# Patient Record
Sex: MALE | Race: WHITE | NOT HISPANIC OR LATINO
[De-identification: names, ages, dates, MRNs, and addresses within clinical notes are randomized per-mention and may not be internally consistent; named-entity substitution may affect disease eponyms.]

---

## 2017-01-05 ENCOUNTER — APPOINTMENT (OUTPATIENT)
Dept: UROLOGY | Facility: CLINIC | Age: 72
End: 2017-01-05

## 2017-01-05 ENCOUNTER — OUTPATIENT (OUTPATIENT)
Dept: OUTPATIENT SERVICES | Facility: HOSPITAL | Age: 72
LOS: 1 days | End: 2017-01-05
Payer: MEDICARE

## 2017-01-05 VITALS
SYSTOLIC BLOOD PRESSURE: 154 MMHG | RESPIRATION RATE: 15 BRPM | HEART RATE: 59 BPM | TEMPERATURE: 97.5 F | DIASTOLIC BLOOD PRESSURE: 78 MMHG

## 2017-01-05 DIAGNOSIS — R35.0 FREQUENCY OF MICTURITION: ICD-10-CM

## 2017-01-05 PROCEDURE — 96402 CHEMO HORMON ANTINEOPL SQ/IM: CPT

## 2017-01-05 RX ORDER — GOSERELIN ACETATE 10.8 MG/1
10.8 IMPLANT SUBCUTANEOUS
Qty: 1 | Refills: 0 | Status: COMPLETED | OUTPATIENT
Start: 2017-01-03 | End: 2017-01-05

## 2017-01-05 RX ORDER — GOSERELIN ACETATE 10.8 MG/1
10.8 IMPLANT SUBCUTANEOUS
Qty: 1 | Refills: 0 | Status: COMPLETED | OUTPATIENT
Start: 2017-01-05

## 2017-01-05 RX ADMIN — GOSERELIN ACETATE 0 MG: 10.8 IMPLANT SUBCUTANEOUS at 00:00

## 2017-01-06 ENCOUNTER — APPOINTMENT (OUTPATIENT)
Dept: NEPHROLOGY | Facility: CLINIC | Age: 72
End: 2017-01-06

## 2017-01-12 DIAGNOSIS — C61 MALIGNANT NEOPLASM OF PROSTATE: ICD-10-CM

## 2017-02-22 ENCOUNTER — LABORATORY RESULT (OUTPATIENT)
Age: 72
End: 2017-02-22

## 2017-03-20 ENCOUNTER — LABORATORY RESULT (OUTPATIENT)
Age: 72
End: 2017-03-20

## 2017-03-21 ENCOUNTER — OTHER (OUTPATIENT)
Age: 72
End: 2017-03-21

## 2017-04-05 ENCOUNTER — OUTPATIENT (OUTPATIENT)
Dept: OUTPATIENT SERVICES | Facility: HOSPITAL | Age: 72
LOS: 1 days | End: 2017-04-05
Payer: MEDICARE

## 2017-04-05 ENCOUNTER — APPOINTMENT (OUTPATIENT)
Dept: UROLOGY | Facility: CLINIC | Age: 72
End: 2017-04-05

## 2017-04-05 DIAGNOSIS — R35.0 FREQUENCY OF MICTURITION: ICD-10-CM

## 2017-04-05 DIAGNOSIS — I10 ESSENTIAL (PRIMARY) HYPERTENSION: ICD-10-CM

## 2017-04-05 DIAGNOSIS — R97.20 ELEVATED PROSTATE SPECIFIC ANTIGEN [PSA]: ICD-10-CM

## 2017-04-05 DIAGNOSIS — C61 MALIGNANT NEOPLASM OF PROSTATE: ICD-10-CM

## 2017-04-05 PROCEDURE — 96402 CHEMO HORMON ANTINEOPL SQ/IM: CPT

## 2017-04-05 RX ORDER — GOSERELIN ACETATE 10.8 MG/1
10.8 IMPLANT SUBCUTANEOUS
Qty: 1 | Refills: 0 | Status: COMPLETED | OUTPATIENT
Start: 2017-04-05

## 2017-04-05 RX ORDER — GOSERELIN ACETATE 10.8 MG/1
10.8 IMPLANT SUBCUTANEOUS
Qty: 1 | Refills: 0 | Status: COMPLETED | OUTPATIENT
Start: 2017-04-04 | End: 2017-04-05

## 2017-04-07 ENCOUNTER — APPOINTMENT (OUTPATIENT)
Dept: NEPHROLOGY | Facility: CLINIC | Age: 72
End: 2017-04-07

## 2017-04-07 VITALS
BODY MASS INDEX: 30.04 KG/M2 | SYSTOLIC BLOOD PRESSURE: 125 MMHG | HEART RATE: 55 BPM | WEIGHT: 202.82 LBS | HEIGHT: 69 IN | OXYGEN SATURATION: 99 % | DIASTOLIC BLOOD PRESSURE: 63 MMHG

## 2017-04-10 LAB
25(OH)D3 SERPL-MCNC: 55.2 NG/ML
ALBUMIN SERPL ELPH-MCNC: 4.1 G/DL
ALP BLD-CCNC: 96 U/L
ALT SERPL-CCNC: 9 U/L
ANION GAP SERPL CALC-SCNC: 17 MMOL/L
APPEARANCE: ABNORMAL
AST SERPL-CCNC: 16 U/L
BACTERIA: NEGATIVE
BASOPHILS # BLD AUTO: 0.03 K/UL
BASOPHILS NFR BLD AUTO: 0.5 %
BILIRUB SERPL-MCNC: 0.3 MG/DL
BILIRUBIN URINE: NEGATIVE
BLOOD URINE: ABNORMAL
BUN SERPL-MCNC: 23 MG/DL
CALCIUM SERPL-MCNC: 10.2 MG/DL
CHLORIDE SERPL-SCNC: 99 MMOL/L
CHOLEST SERPL-MCNC: 141 MG/DL
CHOLEST/HDLC SERPL: 2.8 RATIO
CMV DNA SPEC QL NAA+PROBE: NOT DETECTED IU/ML
CMVPCR LOG: NOT DETECTED LOGIU/ML
CO2 SERPL-SCNC: 21 MMOL/L
COLOR: YELLOW
CREAT SERPL-MCNC: 1.3 MG/DL
EOSINOPHIL # BLD AUTO: 0.12 K/UL
EOSINOPHIL NFR BLD AUTO: 2.1 %
GLUCOSE QUALITATIVE U: NORMAL MG/DL
GLUCOSE SERPL-MCNC: 116 MG/DL
HBA1C MFR BLD HPLC: 6.2 %
HCT VFR BLD CALC: 37.3 %
HDLC SERPL-MCNC: 50 MG/DL
HGB BLD-MCNC: 11.8 G/DL
HYALINE CASTS: 1 /LPF
IMM GRANULOCYTES NFR BLD AUTO: 0.3 %
KETONES URINE: NEGATIVE
LDLC SERPL CALC-MCNC: 74 MG/DL
LEUKOCYTE ESTERASE URINE: ABNORMAL
LYMPHOCYTES # BLD AUTO: 1.41 K/UL
LYMPHOCYTES NFR BLD AUTO: 24.4 %
MAGNESIUM SERPL-MCNC: 2 MG/DL
MAN DIFF?: NORMAL
MCHC RBC-ENTMCNC: 27.6 PG
MCHC RBC-ENTMCNC: 31.6 GM/DL
MCV RBC AUTO: 87.4 FL
MICROSCOPIC-UA: NORMAL
MONOCYTES # BLD AUTO: 0.62 K/UL
MONOCYTES NFR BLD AUTO: 10.7 %
NEUTROPHILS # BLD AUTO: 3.58 K/UL
NEUTROPHILS NFR BLD AUTO: 62 %
NITRITE URINE: NEGATIVE
PH URINE: 7
PHOSPHATE SERPL-MCNC: 3.4 MG/DL
PLATELET # BLD AUTO: 324 K/UL
POTASSIUM SERPL-SCNC: 5 MMOL/L
PROT SERPL-MCNC: 7.5 G/DL
PROTEIN URINE: 100 MG/DL
RBC # BLD: 4.27 M/UL
RBC # FLD: 15.7 %
RED BLOOD CELLS URINE: 239 /HPF
SODIUM SERPL-SCNC: 137 MMOL/L
SPECIFIC GRAVITY URINE: 1.01
SQUAMOUS EPITHELIAL CELLS: 0 /HPF
TACROLIMUS SERPL-MCNC: 5.3 NG/ML
TRIGL SERPL-MCNC: 87 MG/DL
URATE SERPL-MCNC: 5.6 MG/DL
UROBILINOGEN URINE: NORMAL MG/DL
WBC # FLD AUTO: 5.78 K/UL
WHITE BLOOD CELLS URINE: 51 /HPF

## 2017-04-11 LAB
BKV DNA SPEC QL NAA+PROBE: NORMAL
CREAT SPEC-SCNC: 84 MG/DL
MICROALBUMIN 24H UR DL<=1MG/L-MCNC: 36.2 MG/DL
MICROALBUMIN/CREAT 24H UR-RTO: 432 UG/MG

## 2017-05-03 ENCOUNTER — OTHER (OUTPATIENT)
Age: 72
End: 2017-05-03

## 2017-05-03 ENCOUNTER — LABORATORY RESULT (OUTPATIENT)
Age: 72
End: 2017-05-03

## 2017-05-04 ENCOUNTER — APPOINTMENT (OUTPATIENT)
Dept: NUCLEAR MEDICINE | Facility: IMAGING CENTER | Age: 72
End: 2017-05-04

## 2017-05-04 ENCOUNTER — APPOINTMENT (OUTPATIENT)
Dept: CT IMAGING | Facility: IMAGING CENTER | Age: 72
End: 2017-05-04

## 2017-05-04 ENCOUNTER — OUTPATIENT (OUTPATIENT)
Dept: OUTPATIENT SERVICES | Facility: HOSPITAL | Age: 72
LOS: 1 days | End: 2017-05-04
Payer: MEDICARE

## 2017-05-04 DIAGNOSIS — N18.9 CHRONIC KIDNEY DISEASE, UNSPECIFIED: ICD-10-CM

## 2017-05-04 DIAGNOSIS — I10 ESSENTIAL (PRIMARY) HYPERTENSION: ICD-10-CM

## 2017-05-04 DIAGNOSIS — C61 MALIGNANT NEOPLASM OF PROSTATE: ICD-10-CM

## 2017-05-04 DIAGNOSIS — E78.5 HYPERLIPIDEMIA, UNSPECIFIED: ICD-10-CM

## 2017-05-04 PROCEDURE — 74176 CT ABD & PELVIS W/O CONTRAST: CPT

## 2017-05-04 PROCEDURE — 78306 BONE IMAGING WHOLE BODY: CPT

## 2017-05-04 PROCEDURE — A9561: CPT

## 2017-06-22 ENCOUNTER — MEDICATION RENEWAL (OUTPATIENT)
Age: 72
End: 2017-06-22

## 2017-07-13 ENCOUNTER — APPOINTMENT (OUTPATIENT)
Dept: UROLOGY | Facility: CLINIC | Age: 72
End: 2017-07-13

## 2017-07-13 ENCOUNTER — OUTPATIENT (OUTPATIENT)
Dept: OUTPATIENT SERVICES | Facility: HOSPITAL | Age: 72
LOS: 1 days | End: 2017-07-13
Payer: MEDICARE

## 2017-07-13 VITALS
SYSTOLIC BLOOD PRESSURE: 116 MMHG | HEART RATE: 65 BPM | DIASTOLIC BLOOD PRESSURE: 67 MMHG | TEMPERATURE: 98 F | RESPIRATION RATE: 16 BRPM

## 2017-07-13 DIAGNOSIS — R35.0 FREQUENCY OF MICTURITION: ICD-10-CM

## 2017-07-13 DIAGNOSIS — R31.0 GROSS HEMATURIA: ICD-10-CM

## 2017-07-13 PROCEDURE — 96402 CHEMO HORMON ANTINEOPL SQ/IM: CPT

## 2017-07-13 RX ORDER — GOSERELIN ACETATE 10.8 MG/1
10.8 IMPLANT SUBCUTANEOUS
Qty: 1 | Refills: 0 | Status: COMPLETED | OUTPATIENT
Start: 2017-07-13

## 2017-07-13 RX ORDER — GOSERELIN ACETATE 10.8 MG/1
10.8 IMPLANT SUBCUTANEOUS
Qty: 1 | Refills: 0 | Status: COMPLETED | OUTPATIENT
Start: 2017-07-11 | End: 2017-07-13

## 2017-07-13 RX ADMIN — Medication 0 MG: at 00:00

## 2017-07-14 DIAGNOSIS — C61 MALIGNANT NEOPLASM OF PROSTATE: ICD-10-CM

## 2017-07-14 DIAGNOSIS — R31.0 GROSS HEMATURIA: ICD-10-CM

## 2017-07-14 LAB
APPEARANCE: ABNORMAL
BACTERIA: ABNORMAL
BILIRUBIN URINE: NEGATIVE
BLOOD URINE: ABNORMAL
COLOR: ABNORMAL
GLUCOSE QUALITATIVE U: NORMAL MG/DL
HYALINE CASTS: 0 /LPF
KETONES URINE: NEGATIVE
LEUKOCYTE ESTERASE URINE: NEGATIVE
MICROSCOPIC-UA: NORMAL
NITRITE URINE: NEGATIVE
PH URINE: 6.5
PROTEIN URINE: 100 MG/DL
RED BLOOD CELLS URINE: >720 /HPF
SPECIFIC GRAVITY URINE: 1.01
SQUAMOUS EPITHELIAL CELLS: 8 /HPF
UROBILINOGEN URINE: NORMAL MG/DL
WHITE BLOOD CELLS URINE: 28 /HPF

## 2017-07-16 ENCOUNTER — RX RENEWAL (OUTPATIENT)
Age: 72
End: 2017-07-16

## 2017-07-18 ENCOUNTER — OUTPATIENT (OUTPATIENT)
Dept: OUTPATIENT SERVICES | Facility: HOSPITAL | Age: 72
LOS: 1 days | End: 2017-07-18
Payer: MEDICARE

## 2017-07-18 DIAGNOSIS — C61 MALIGNANT NEOPLASM OF PROSTATE: ICD-10-CM

## 2017-07-18 PROCEDURE — C1894: CPT

## 2017-07-18 PROCEDURE — 36561 INSERT TUNNELED CV CATH: CPT

## 2017-07-18 PROCEDURE — 76937 US GUIDE VASCULAR ACCESS: CPT | Mod: 26

## 2017-07-18 PROCEDURE — C1788: CPT

## 2017-07-18 PROCEDURE — 77001 FLUOROGUIDE FOR VEIN DEVICE: CPT

## 2017-07-18 PROCEDURE — C1769: CPT

## 2017-07-18 PROCEDURE — 77001 FLUOROGUIDE FOR VEIN DEVICE: CPT | Mod: 26

## 2017-07-18 PROCEDURE — 76937 US GUIDE VASCULAR ACCESS: CPT

## 2017-07-24 DIAGNOSIS — Z45.2 ENCOUNTER FOR ADJUSTMENT AND MANAGEMENT OF VASCULAR ACCESS DEVICE: ICD-10-CM

## 2017-07-24 DIAGNOSIS — C61 MALIGNANT NEOPLASM OF PROSTATE: ICD-10-CM

## 2017-07-29 LAB — CORE LAB FLUID CYTOLOGY: NORMAL

## 2017-08-11 ENCOUNTER — APPOINTMENT (OUTPATIENT)
Dept: NEPHROLOGY | Facility: CLINIC | Age: 72
End: 2017-08-11
Payer: MEDICARE

## 2017-08-11 VITALS
HEART RATE: 76 BPM | HEIGHT: 69 IN | DIASTOLIC BLOOD PRESSURE: 71 MMHG | WEIGHT: 188 LBS | BODY MASS INDEX: 27.85 KG/M2 | SYSTOLIC BLOOD PRESSURE: 143 MMHG | OXYGEN SATURATION: 98 %

## 2017-08-11 PROCEDURE — 99214 OFFICE O/P EST MOD 30 MIN: CPT

## 2017-08-11 RX ORDER — LIDOCAINE AND PRILOCAINE 25; 25 MG/G; MG/G
2.5-2.5 CREAM TOPICAL
Qty: 30 | Refills: 0 | Status: ACTIVE | COMMUNITY
Start: 2017-07-11

## 2017-08-11 RX ORDER — ONDANSETRON 4 MG/1
4 TABLET ORAL
Qty: 30 | Refills: 0 | Status: ACTIVE | COMMUNITY
Start: 2017-07-11

## 2017-08-11 RX ORDER — DOCETAXEL 80 MG/4ML
INJECTION, SOLUTION, CONCENTRATE INTRAVENOUS
Refills: 0 | Status: ACTIVE | COMMUNITY

## 2017-08-11 RX ORDER — DEXAMETHASONE 4 MG/1
4 TABLET ORAL
Qty: 4 | Refills: 0 | Status: DISCONTINUED | COMMUNITY
Start: 2017-07-11 | End: 2017-08-11

## 2017-08-11 RX ORDER — ABIRATERONE ACETATE 250 MG/1
250 TABLET ORAL
Qty: 120 | Refills: 0 | Status: DISCONTINUED | COMMUNITY
Start: 2017-04-21 | End: 2017-08-11

## 2017-08-14 ENCOUNTER — LABORATORY RESULT (OUTPATIENT)
Age: 72
End: 2017-08-14

## 2017-08-15 LAB
25(OH)D3 SERPL-MCNC: 37.2 NG/ML
ALBUMIN SERPL ELPH-MCNC: 3.8 G/DL
ALP BLD-CCNC: 78 U/L
ALT SERPL-CCNC: 16 U/L
ANION GAP SERPL CALC-SCNC: 15 MMOL/L
APPEARANCE: ABNORMAL
AST SERPL-CCNC: 14 U/L
BACTERIA: ABNORMAL
BASOPHILS # BLD AUTO: 0.01 K/UL
BASOPHILS NFR BLD AUTO: 0.1 %
BILIRUB SERPL-MCNC: 0.7 MG/DL
BILIRUBIN URINE: NEGATIVE
BLOOD URINE: ABNORMAL
BUN SERPL-MCNC: 36 MG/DL
CALCIUM SERPL-MCNC: 9.3 MG/DL
CHLORIDE SERPL-SCNC: 100 MMOL/L
CHOLEST SERPL-MCNC: 148 MG/DL
CHOLEST/HDLC SERPL: 2.3 RATIO
CMV DNA SPEC QL NAA+PROBE: NOT DETECTED IU/ML
CO2 SERPL-SCNC: 20 MMOL/L
COLOR: YELLOW
CREAT SERPL-MCNC: 1.56 MG/DL
CREAT SPEC-SCNC: 76 MG/DL
EOSINOPHIL # BLD AUTO: 0.03 K/UL
EOSINOPHIL NFR BLD AUTO: 0.3 %
GLUCOSE QUALITATIVE U: NORMAL MG/DL
GLUCOSE SERPL-MCNC: 144 MG/DL
HBA1C MFR BLD HPLC: 6.4 %
HCT VFR BLD CALC: 34.3 %
HDLC SERPL-MCNC: 64 MG/DL
HGB BLD-MCNC: 10.7 G/DL
HYALINE CASTS: 0 /LPF
IMM GRANULOCYTES NFR BLD AUTO: 0.4 %
KETONES URINE: NEGATIVE
LDLC SERPL CALC-MCNC: 66 MG/DL
LEUKOCYTE ESTERASE URINE: ABNORMAL
LYMPHOCYTES # BLD AUTO: 0.76 K/UL
LYMPHOCYTES NFR BLD AUTO: 7.9 %
MAGNESIUM SERPL-MCNC: 1.7 MG/DL
MAN DIFF?: NORMAL
MCHC RBC-ENTMCNC: 26 PG
MCHC RBC-ENTMCNC: 31.2 GM/DL
MCV RBC AUTO: 83.3 FL
MICROALBUMIN 24H UR DL<=1MG/L-MCNC: 108.8 MG/DL
MICROALBUMIN/CREAT 24H UR-RTO: 1432 MG/G
MICROSCOPIC-UA: NORMAL
MONOCYTES # BLD AUTO: 0.52 K/UL
MONOCYTES NFR BLD AUTO: 5.4 %
NEUTROPHILS # BLD AUTO: 8.28 K/UL
NEUTROPHILS NFR BLD AUTO: 85.9 %
NITRITE URINE: NEGATIVE
PH URINE: 6.5
PLATELET # BLD AUTO: 182 K/UL
POTASSIUM SERPL-SCNC: 5 MMOL/L
PROT SERPL-MCNC: 6.2 G/DL
PROTEIN URINE: 300 MG/DL
RBC # BLD: 4.12 M/UL
RBC # FLD: 16.7 %
RED BLOOD CELLS URINE: 352 /HPF
SODIUM SERPL-SCNC: 135 MMOL/L
SPECIFIC GRAVITY URINE: 1.02
SQUAMOUS EPITHELIAL CELLS: 3 /HPF
TACROLIMUS SERPL-MCNC: 5.6 NG/ML
TRIGL SERPL-MCNC: 92 MG/DL
URATE SERPL-MCNC: 5.4 MG/DL
UROBILINOGEN URINE: NORMAL MG/DL
WBC # FLD AUTO: 9.64 K/UL
WHITE BLOOD CELLS URINE: >720 /HPF

## 2017-08-17 LAB — BKV DNA SPEC QL NAA+PROBE: NORMAL

## 2017-09-13 ENCOUNTER — OTHER (OUTPATIENT)
Age: 72
End: 2017-09-13

## 2017-09-13 DIAGNOSIS — R31.29 OTHER MICROSCOPIC HEMATURIA: ICD-10-CM

## 2017-09-15 ENCOUNTER — LABORATORY RESULT (OUTPATIENT)
Age: 72
End: 2017-09-15

## 2017-09-15 ENCOUNTER — MEDICATION RENEWAL (OUTPATIENT)
Age: 72
End: 2017-09-15

## 2017-09-15 LAB
ALBUMIN SERPL ELPH-MCNC: 3.6 G/DL
ALP BLD-CCNC: 79 U/L
ALT SERPL-CCNC: 24 U/L
ANION GAP SERPL CALC-SCNC: 17 MMOL/L
APPEARANCE: ABNORMAL
AST SERPL-CCNC: 22 U/L
BACTERIA: NEGATIVE
BASOPHILS # BLD AUTO: 0.07 K/UL
BASOPHILS NFR BLD AUTO: 2.6 %
BILIRUB SERPL-MCNC: 0.4 MG/DL
BILIRUBIN URINE: NEGATIVE
BLOOD URINE: ABNORMAL
BUN SERPL-MCNC: 33 MG/DL
CALCIUM SERPL-MCNC: 10 MG/DL
CHLORIDE SERPL-SCNC: 104 MMOL/L
CO2 SERPL-SCNC: 20 MMOL/L
COLOR: YELLOW
CREAT SERPL-MCNC: 1.28 MG/DL
CREAT SPEC-SCNC: 40 MG/DL
EOSINOPHIL # BLD AUTO: 0 K/UL
EOSINOPHIL NFR BLD AUTO: 0 %
GLUCOSE QUALITATIVE U: NORMAL MG/DL
GLUCOSE SERPL-MCNC: 123 MG/DL
HCT VFR BLD CALC: 29.1 %
HGB BLD-MCNC: 9.1 G/DL
HYALINE CASTS: 1 /LPF
KETONES URINE: NEGATIVE
LEUKOCYTE ESTERASE URINE: ABNORMAL
LYMPHOCYTES # BLD AUTO: 1.12 K/UL
LYMPHOCYTES NFR BLD AUTO: 39.1 %
MAGNESIUM SERPL-MCNC: 1.7 MG/DL
MAN DIFF?: NORMAL
MCHC RBC-ENTMCNC: 26.6 PG
MCHC RBC-ENTMCNC: 31.3 GM/DL
MCV RBC AUTO: 85.1 FL
MICROALBUMIN 24H UR DL<=1MG/L-MCNC: 56.7 MG/DL
MICROALBUMIN/CREAT 24H UR-RTO: 1418 MG/G
MICROSCOPIC-UA: NORMAL
MONOCYTES # BLD AUTO: 0.15 K/UL
MONOCYTES NFR BLD AUTO: 5.2 %
NEUTROPHILS # BLD AUTO: 1.49 K/UL
NEUTROPHILS NFR BLD AUTO: 50.5 %
NITRITE URINE: NEGATIVE
PH URINE: 6.5
PHOSPHATE SERPL-MCNC: 3.2 MG/DL
PLATELET # BLD AUTO: 278 K/UL
POTASSIUM SERPL-SCNC: 4.6 MMOL/L
PROT SERPL-MCNC: 6.6 G/DL
PROTEIN URINE: 100 MG/DL
RBC # BLD: 3.42 M/UL
RBC # FLD: 18.5 %
RED BLOOD CELLS URINE: 453 /HPF
SODIUM SERPL-SCNC: 141 MMOL/L
SPECIFIC GRAVITY URINE: 1.01
SQUAMOUS EPITHELIAL CELLS: 0 /HPF
TACROLIMUS SERPL-MCNC: 3.6 NG/ML
URATE SERPL-MCNC: 6 MG/DL
UROBILINOGEN URINE: NORMAL MG/DL
WBC # FLD AUTO: 2.86 K/UL
WHITE BLOOD CELLS URINE: 215 /HPF

## 2017-10-11 ENCOUNTER — APPOINTMENT (OUTPATIENT)
Dept: UROLOGY | Facility: CLINIC | Age: 72
End: 2017-10-11
Payer: MEDICARE

## 2017-10-11 ENCOUNTER — OUTPATIENT (OUTPATIENT)
Dept: OUTPATIENT SERVICES | Facility: HOSPITAL | Age: 72
LOS: 1 days | End: 2017-10-11
Payer: MEDICARE

## 2017-10-11 DIAGNOSIS — R97.20 ELEVATED PROSTATE, SPECIFIC ANTIGEN [PSA]: ICD-10-CM

## 2017-10-11 DIAGNOSIS — R35.0 FREQUENCY OF MICTURITION: ICD-10-CM

## 2017-10-11 DIAGNOSIS — R97.20 ELEVATED PROSTATE SPECIFIC ANTIGEN [PSA]: ICD-10-CM

## 2017-10-11 DIAGNOSIS — Z94.0 KIDNEY TRANSPLANT STATUS: ICD-10-CM

## 2017-10-11 DIAGNOSIS — C61 MALIGNANT NEOPLASM OF PROSTATE: ICD-10-CM

## 2017-10-11 PROCEDURE — 99213 OFFICE O/P EST LOW 20 MIN: CPT

## 2017-10-11 PROCEDURE — 88112 CYTOPATH CELL ENHANCE TECH: CPT | Mod: 26

## 2017-10-11 PROCEDURE — 96402 CHEMO HORMON ANTINEOPL SQ/IM: CPT

## 2017-10-11 RX ORDER — GOSERELIN ACETATE 10.8 MG/1
10.8 IMPLANT SUBCUTANEOUS
Qty: 1 | Refills: 0 | Status: COMPLETED | OUTPATIENT
Start: 2017-10-11

## 2017-10-11 RX ORDER — GOSERELIN ACETATE 10.8 MG/1
10.8 IMPLANT SUBCUTANEOUS
Qty: 1 | Refills: 0 | Status: COMPLETED | OUTPATIENT
Start: 2017-10-10 | End: 2017-10-11

## 2017-10-11 RX ADMIN — Medication 0 MG: at 00:00

## 2017-10-12 LAB
APPEARANCE: ABNORMAL
BACTERIA UR CULT: NORMAL
BACTERIA: NEGATIVE
BILIRUBIN URINE: NEGATIVE
BLOOD URINE: ABNORMAL
CALCIUM OXALATE CRYSTALS: NEGATIVE
COLOR: YELLOW
GLUCOSE QUALITATIVE U: NEGATIVE MG/DL
GRANULAR CASTS: 0 /LPF
HYALINE CASTS: 0 /LPF
KETONES URINE: NEGATIVE
LEUKOCYTE ESTERASE URINE: ABNORMAL
MICROSCOPIC-UA: NORMAL
NITRITE URINE: NEGATIVE
PH URINE: 6
PROTEIN URINE: 100 MG/DL
RED BLOOD CELLS URINE: 25 /HPF
SPECIFIC GRAVITY URINE: 1.01
SQUAMOUS EPITHELIAL CELLS: 1 /HPF
TRIPLE PHOSPHATE CRYSTALS: NEGATIVE
URIC ACID CRYSTALS: NEGATIVE
UROBILINOGEN URINE: NEGATIVE MG/DL
WHITE BLOOD CELLS URINE: >720 /HPF

## 2017-10-16 LAB — CORE LAB FLUID CYTOLOGY: NORMAL

## 2017-10-30 ENCOUNTER — LABORATORY RESULT (OUTPATIENT)
Age: 72
End: 2017-10-30

## 2017-10-30 ENCOUNTER — APPOINTMENT (OUTPATIENT)
Dept: NEPHROLOGY | Facility: CLINIC | Age: 72
End: 2017-10-30
Payer: MEDICARE

## 2017-10-30 VITALS
OXYGEN SATURATION: 98 % | HEIGHT: 69 IN | HEART RATE: 83 BPM | SYSTOLIC BLOOD PRESSURE: 126 MMHG | WEIGHT: 200 LBS | DIASTOLIC BLOOD PRESSURE: 70 MMHG | BODY MASS INDEX: 29.62 KG/M2

## 2017-10-30 DIAGNOSIS — I10 ESSENTIAL (PRIMARY) HYPERTENSION: ICD-10-CM

## 2017-10-30 DIAGNOSIS — Z94.0 KIDNEY TRANSPLANT STATUS: ICD-10-CM

## 2017-10-30 PROCEDURE — 36415 COLL VENOUS BLD VENIPUNCTURE: CPT

## 2017-10-30 PROCEDURE — 99214 OFFICE O/P EST MOD 30 MIN: CPT | Mod: 25

## 2017-11-01 LAB
25(OH)D3 SERPL-MCNC: 28.9 NG/ML
ALBUMIN SERPL ELPH-MCNC: 4.2 G/DL
ALP BLD-CCNC: 65 U/L
ALT SERPL-CCNC: 17 U/L
ANION GAP SERPL CALC-SCNC: 14 MMOL/L
APPEARANCE: ABNORMAL
AST SERPL-CCNC: 13 U/L
BACTERIA: NEGATIVE
BASOPHILS # BLD AUTO: 0 K/UL
BASOPHILS NFR BLD AUTO: 0 %
BILIRUB SERPL-MCNC: 0.2 MG/DL
BILIRUBIN URINE: NEGATIVE
BKV DNA SPEC QL NAA+PROBE: NORMAL
BLOOD URINE: ABNORMAL
BUN SERPL-MCNC: 34 MG/DL
CALCIUM OXALATE CRYSTALS: NEGATIVE
CALCIUM SERPL-MCNC: 9.8 MG/DL
CHLORIDE SERPL-SCNC: 102 MMOL/L
CHOLEST SERPL-MCNC: 154 MG/DL
CHOLEST/HDLC SERPL: 2.8 RATIO
CMV DNA SPEC QL NAA+PROBE: NOT DETECTED IU/ML
CMVPCR LOG: NOT DETECTED LOGIU/ML
CO2 SERPL-SCNC: 22 MMOL/L
COLOR: ABNORMAL
CREAT SERPL-MCNC: 1.37 MG/DL
CREAT SPEC-SCNC: 74 MG/DL
EOSINOPHIL # BLD AUTO: 0.04 K/UL
EOSINOPHIL NFR BLD AUTO: 0.9 %
GLUCOSE QUALITATIVE U: NEGATIVE
GLUCOSE SERPL-MCNC: 123 MG/DL
GRANULAR CASTS: 0 /LPF
HBA1C MFR BLD HPLC: 5.5 %
HCT VFR BLD CALC: 32 %
HDLC SERPL-MCNC: 56 MG/DL
HGB BLD-MCNC: 9.7 G/DL
HYALINE CASTS: 0 /LPF
KETONES URINE: NEGATIVE
LDLC SERPL CALC-MCNC: 74 MG/DL
LEUKOCYTE ESTERASE URINE: ABNORMAL
LYMPHOCYTES # BLD AUTO: 0.67 K/UL
LYMPHOCYTES NFR BLD AUTO: 16.7 %
MAGNESIUM SERPL-MCNC: 1.8 MG/DL
MAN DIFF?: NORMAL
MCHC RBC-ENTMCNC: 25.3 PG
MCHC RBC-ENTMCNC: 30.3 GM/DL
MCV RBC AUTO: 83.3 FL
MICROALBUMIN 24H UR DL<=1MG/L-MCNC: 70.9 MG/DL
MICROALBUMIN/CREAT 24H UR-RTO: 958 MG/G
MICROSCOPIC-UA: NORMAL
MONOCYTES # BLD AUTO: 0.81 K/UL
MONOCYTES NFR BLD AUTO: 20.2 %
NEUTROPHILS # BLD AUTO: 2.39 K/UL
NEUTROPHILS NFR BLD AUTO: 59.6 %
NITRITE URINE: NEGATIVE
PH URINE: 6
PHOSPHATE SERPL-MCNC: 3.9 MG/DL
PLATELET # BLD AUTO: 299 K/UL
POTASSIUM SERPL-SCNC: 5 MMOL/L
PROT SERPL-MCNC: 5.8 G/DL
PROTEIN URINE: 100
RBC # BLD: 3.84 M/UL
RBC # FLD: 17.6 %
RED BLOOD CELLS URINE: >720 /HPF
SODIUM SERPL-SCNC: 138 MMOL/L
SPECIFIC GRAVITY URINE: 1.01
SQUAMOUS EPITHELIAL CELLS: 0 /HPF
TACROLIMUS SERPL-MCNC: 5.7 NG/ML
TRIGL SERPL-MCNC: 122 MG/DL
TRIPLE PHOSPHATE CRYSTALS: NEGATIVE
URATE SERPL-MCNC: 5.7 MG/DL
URIC ACID CRYSTALS: NEGATIVE
UROBILINOGEN URINE: NEGATIVE
WBC # FLD AUTO: 4.01 K/UL
WHITE BLOOD CELLS URINE: 112 /HPF

## 2017-12-16 ENCOUNTER — INPATIENT (INPATIENT)
Facility: HOSPITAL | Age: 72
LOS: 11 days | End: 2017-12-28
Attending: INTERNAL MEDICINE | Admitting: INTERNAL MEDICINE
Payer: MEDICARE

## 2017-12-16 VITALS
SYSTOLIC BLOOD PRESSURE: 95 MMHG | TEMPERATURE: 98 F | OXYGEN SATURATION: 83 % | DIASTOLIC BLOOD PRESSURE: 35 MMHG | HEART RATE: 91 BPM | RESPIRATION RATE: 32 BRPM

## 2017-12-16 DIAGNOSIS — R09.02 HYPOXEMIA: ICD-10-CM

## 2017-12-16 DIAGNOSIS — Z94.0 KIDNEY TRANSPLANT STATUS: Chronic | ICD-10-CM

## 2017-12-16 LAB
ALBUMIN SERPL ELPH-MCNC: 3.2 G/DL — LOW (ref 3.3–5)
ALP SERPL-CCNC: 126 U/L — HIGH (ref 40–120)
ALT FLD-CCNC: 32 U/L — SIGNIFICANT CHANGE UP (ref 4–41)
ANISOCYTOSIS BLD QL: SLIGHT — SIGNIFICANT CHANGE UP
AST SERPL-CCNC: 34 U/L — SIGNIFICANT CHANGE UP (ref 4–40)
B PERT DNA SPEC QL NAA+PROBE: SIGNIFICANT CHANGE UP
BASE EXCESS BLDV CALC-SCNC: -2.1 MMOL/L — SIGNIFICANT CHANGE UP
BASE EXCESS BLDV CALC-SCNC: -5.7 MMOL/L — SIGNIFICANT CHANGE UP
BASOPHILS # BLD AUTO: 0.06 K/UL — SIGNIFICANT CHANGE UP (ref 0–0.2)
BASOPHILS NFR BLD AUTO: 0.3 % — SIGNIFICANT CHANGE UP (ref 0–2)
BASOPHILS NFR SPEC: 0 % — SIGNIFICANT CHANGE UP (ref 0–2)
BILIRUB SERPL-MCNC: 0.4 MG/DL — SIGNIFICANT CHANGE UP (ref 0.2–1.2)
BLOOD GAS VENOUS - CREATININE: 2.29 MG/DL — HIGH (ref 0.5–1.3)
BLOOD GAS VENOUS - CREATININE: SIGNIFICANT CHANGE UP MG/DL (ref 0.5–1.3)
BUN SERPL-MCNC: 72 MG/DL — HIGH (ref 7–23)
C PNEUM DNA SPEC QL NAA+PROBE: NOT DETECTED — SIGNIFICANT CHANGE UP
CALCIUM SERPL-MCNC: 9.6 MG/DL — SIGNIFICANT CHANGE UP (ref 8.4–10.5)
CHLORIDE BLDV-SCNC: 108 MMOL/L — SIGNIFICANT CHANGE UP (ref 96–108)
CHLORIDE BLDV-SCNC: 109 MMOL/L — HIGH (ref 96–108)
CHLORIDE SERPL-SCNC: 103 MMOL/L — SIGNIFICANT CHANGE UP (ref 98–107)
CK MB BLD-MCNC: 1.65 NG/ML — SIGNIFICANT CHANGE UP (ref 1–6.6)
CK SERPL-CCNC: 41 U/L — SIGNIFICANT CHANGE UP (ref 30–200)
CO2 SERPL-SCNC: 18 MMOL/L — LOW (ref 22–31)
CREAT SERPL-MCNC: 2.23 MG/DL — HIGH (ref 0.5–1.3)
EOSINOPHIL # BLD AUTO: 0 K/UL — SIGNIFICANT CHANGE UP (ref 0–0.5)
EOSINOPHIL NFR BLD AUTO: 0 % — SIGNIFICANT CHANGE UP (ref 0–6)
EOSINOPHIL NFR FLD: 0.8 % — SIGNIFICANT CHANGE UP (ref 0–6)
FLUAV H1 2009 PAND RNA SPEC QL NAA+PROBE: NOT DETECTED — SIGNIFICANT CHANGE UP
FLUAV H1 RNA SPEC QL NAA+PROBE: NOT DETECTED — SIGNIFICANT CHANGE UP
FLUAV H3 RNA SPEC QL NAA+PROBE: NOT DETECTED — SIGNIFICANT CHANGE UP
FLUAV SUBTYP SPEC NAA+PROBE: SIGNIFICANT CHANGE UP
FLUBV RNA SPEC QL NAA+PROBE: NOT DETECTED — SIGNIFICANT CHANGE UP
GAS PNL BLDV: 135 MMOL/L — LOW (ref 136–146)
GAS PNL BLDV: 140 MMOL/L — SIGNIFICANT CHANGE UP (ref 136–146)
GIANT PLATELETS BLD QL SMEAR: PRESENT — SIGNIFICANT CHANGE UP
GLUCOSE BLDV-MCNC: 173 — HIGH (ref 70–99)
GLUCOSE BLDV-MCNC: 209 — HIGH (ref 70–99)
GLUCOSE SERPL-MCNC: 204 MG/DL — HIGH (ref 70–99)
HADV DNA SPEC QL NAA+PROBE: NOT DETECTED — SIGNIFICANT CHANGE UP
HCO3 BLDV-SCNC: 19 MMOL/L — LOW (ref 20–27)
HCO3 BLDV-SCNC: 22 MMOL/L — SIGNIFICANT CHANGE UP (ref 20–27)
HCOV 229E RNA SPEC QL NAA+PROBE: NOT DETECTED — SIGNIFICANT CHANGE UP
HCOV HKU1 RNA SPEC QL NAA+PROBE: NOT DETECTED — SIGNIFICANT CHANGE UP
HCOV NL63 RNA SPEC QL NAA+PROBE: NOT DETECTED — SIGNIFICANT CHANGE UP
HCOV OC43 RNA SPEC QL NAA+PROBE: NOT DETECTED — SIGNIFICANT CHANGE UP
HCT VFR BLD CALC: 33 % — LOW (ref 39–50)
HCT VFR BLDV CALC: 30.9 % — LOW (ref 39–51)
HCT VFR BLDV CALC: 31.2 % — LOW (ref 39–51)
HGB BLD-MCNC: 10 G/DL — LOW (ref 13–17)
HGB BLDV-MCNC: 10 G/DL — LOW (ref 13–17)
HGB BLDV-MCNC: 10.1 G/DL — LOW (ref 13–17)
HMPV RNA SPEC QL NAA+PROBE: NOT DETECTED — SIGNIFICANT CHANGE UP
HPIV1 RNA SPEC QL NAA+PROBE: NOT DETECTED — SIGNIFICANT CHANGE UP
HPIV2 RNA SPEC QL NAA+PROBE: NOT DETECTED — SIGNIFICANT CHANGE UP
HPIV3 RNA SPEC QL NAA+PROBE: NOT DETECTED — SIGNIFICANT CHANGE UP
HPIV4 RNA SPEC QL NAA+PROBE: NOT DETECTED — SIGNIFICANT CHANGE UP
HYPOCHROMIA BLD QL: SLIGHT — SIGNIFICANT CHANGE UP
IMM GRANULOCYTES # BLD AUTO: 0.72 # — SIGNIFICANT CHANGE UP
IMM GRANULOCYTES NFR BLD AUTO: 3.4 % — HIGH (ref 0–1.5)
LACTATE BLDV-MCNC: 2.7 MMOL/L — HIGH (ref 0.5–2)
LACTATE BLDV-MCNC: 4.2 MMOL/L — CRITICAL HIGH (ref 0.5–2)
LYMPHOCYTES # BLD AUTO: 0.98 K/UL — LOW (ref 1–3.3)
LYMPHOCYTES # BLD AUTO: 4.7 % — LOW (ref 13–44)
LYMPHOCYTES NFR SPEC AUTO: 2.6 % — LOW (ref 13–44)
M PNEUMO DNA SPEC QL NAA+PROBE: NOT DETECTED — SIGNIFICANT CHANGE UP
MCHC RBC-ENTMCNC: 21.8 PG — LOW (ref 27–34)
MCHC RBC-ENTMCNC: 30.3 % — LOW (ref 32–36)
MCV RBC AUTO: 72.1 FL — LOW (ref 80–100)
MICROCYTES BLD QL: SIGNIFICANT CHANGE UP
MONOCYTES # BLD AUTO: 2 K/UL — HIGH (ref 0–0.9)
MONOCYTES NFR BLD AUTO: 9.5 % — SIGNIFICANT CHANGE UP (ref 2–14)
MONOCYTES NFR BLD: 3.5 % — SIGNIFICANT CHANGE UP (ref 2–9)
MYELOCYTES NFR BLD: 0.9 % — HIGH (ref 0–0)
NEUTROPHIL AB SER-ACNC: 91.3 % — HIGH (ref 43–77)
NEUTROPHILS # BLD AUTO: 17.29 K/UL — HIGH (ref 1.8–7.4)
NEUTROPHILS NFR BLD AUTO: 82.1 % — HIGH (ref 43–77)
NRBC # FLD: 0.2 — SIGNIFICANT CHANGE UP
NRBC FLD-RTO: 1 — SIGNIFICANT CHANGE UP
NT-PROBNP SERPL-SCNC: 6552 PG/ML — SIGNIFICANT CHANGE UP
OTHER - HEMATOLOGY %: 0.9 — SIGNIFICANT CHANGE UP
OVALOCYTES BLD QL SMEAR: SLIGHT — SIGNIFICANT CHANGE UP
PCO2 BLDV: 33 MMHG — LOW (ref 41–51)
PCO2 BLDV: 42 MMHG — SIGNIFICANT CHANGE UP (ref 41–51)
PH BLDV: 7.35 PH — SIGNIFICANT CHANGE UP (ref 7.32–7.43)
PH BLDV: 7.37 PH — SIGNIFICANT CHANGE UP (ref 7.32–7.43)
PLATELET # BLD AUTO: 390 K/UL — SIGNIFICANT CHANGE UP (ref 150–400)
PLATELET COUNT - ESTIMATE: NORMAL — SIGNIFICANT CHANGE UP
PMV BLD: 9 FL — SIGNIFICANT CHANGE UP (ref 7–13)
PO2 BLDV: 24 MMHG — LOW (ref 35–40)
PO2 BLDV: 29 MMHG — LOW (ref 35–40)
POIKILOCYTOSIS BLD QL AUTO: SLIGHT — SIGNIFICANT CHANGE UP
POLYCHROMASIA BLD QL SMEAR: SLIGHT — SIGNIFICANT CHANGE UP
POTASSIUM BLDV-SCNC: 4.6 MMOL/L — HIGH (ref 3.4–4.5)
POTASSIUM BLDV-SCNC: 5 MMOL/L — HIGH (ref 3.4–4.5)
POTASSIUM SERPL-MCNC: 4.7 MMOL/L — SIGNIFICANT CHANGE UP (ref 3.5–5.3)
POTASSIUM SERPL-SCNC: 4.7 MMOL/L — SIGNIFICANT CHANGE UP (ref 3.5–5.3)
PROT SERPL-MCNC: 6 G/DL — SIGNIFICANT CHANGE UP (ref 6–8.3)
RBC # BLD: 4.58 M/UL — SIGNIFICANT CHANGE UP (ref 4.2–5.8)
RBC # FLD: 20.5 % — HIGH (ref 10.3–14.5)
RSV RNA SPEC QL NAA+PROBE: NOT DETECTED — SIGNIFICANT CHANGE UP
RV+EV RNA SPEC QL NAA+PROBE: NOT DETECTED — SIGNIFICANT CHANGE UP
SAO2 % BLDV: 36.3 % — LOW (ref 60–85)
SAO2 % BLDV: 39.8 % — LOW (ref 60–85)
SCHISTOCYTES BLD QL AUTO: SLIGHT — SIGNIFICANT CHANGE UP
SODIUM SERPL-SCNC: 142 MMOL/L — SIGNIFICANT CHANGE UP (ref 135–145)
TROPONIN T SERPL-MCNC: < 0.06 NG/ML — SIGNIFICANT CHANGE UP (ref 0–0.06)
WBC # BLD: 21.05 K/UL — HIGH (ref 3.8–10.5)
WBC # FLD AUTO: 21.05 K/UL — HIGH (ref 3.8–10.5)

## 2017-12-16 PROCEDURE — 71010: CPT | Mod: 26

## 2017-12-16 RX ORDER — ATORVASTATIN CALCIUM 80 MG/1
10 TABLET, FILM COATED ORAL AT BEDTIME
Qty: 0 | Refills: 0 | Status: DISCONTINUED | OUTPATIENT
Start: 2017-12-16 | End: 2017-12-25

## 2017-12-16 RX ORDER — AZITHROMYCIN 500 MG/1
500 TABLET, FILM COATED ORAL EVERY 24 HOURS
Qty: 0 | Refills: 0 | Status: DISCONTINUED | OUTPATIENT
Start: 2017-12-17 | End: 2017-12-19

## 2017-12-16 RX ORDER — PIPERACILLIN AND TAZOBACTAM 4; .5 G/20ML; G/20ML
3.38 INJECTION, POWDER, LYOPHILIZED, FOR SOLUTION INTRAVENOUS ONCE
Qty: 0 | Refills: 0 | Status: COMPLETED | OUTPATIENT
Start: 2017-12-16 | End: 2017-12-16

## 2017-12-16 RX ORDER — MYCOPHENOLATE MOFETIL 250 MG/1
0.5 CAPSULE ORAL EVERY 12 HOURS
Qty: 0 | Refills: 0 | Status: DISCONTINUED | OUTPATIENT
Start: 2017-12-16 | End: 2017-12-17

## 2017-12-16 RX ORDER — AZITHROMYCIN 500 MG/1
500 TABLET, FILM COATED ORAL ONCE
Qty: 0 | Refills: 0 | Status: COMPLETED | OUTPATIENT
Start: 2017-12-16 | End: 2017-12-16

## 2017-12-16 RX ORDER — MYCOPHENOLIC ACID 180 MG/1
360 TABLET, DELAYED RELEASE ORAL
Qty: 0 | Refills: 0 | Status: DISCONTINUED | OUTPATIENT
Start: 2017-12-16 | End: 2017-12-16

## 2017-12-16 RX ORDER — TACROLIMUS 5 MG/1
2.5 CAPSULE ORAL EVERY 12 HOURS
Qty: 0 | Refills: 0 | Status: DISCONTINUED | OUTPATIENT
Start: 2017-12-16 | End: 2017-12-16

## 2017-12-16 RX ORDER — MYCOPHENOLATE MOFETIL 250 MG/1
360 CAPSULE ORAL
Qty: 0 | Refills: 0 | Status: DISCONTINUED | OUTPATIENT
Start: 2017-12-16 | End: 2017-12-16

## 2017-12-16 RX ORDER — AZITHROMYCIN 500 MG/1
TABLET, FILM COATED ORAL
Qty: 0 | Refills: 0 | Status: DISCONTINUED | OUTPATIENT
Start: 2017-12-16 | End: 2017-12-19

## 2017-12-16 RX ORDER — TACROLIMUS 5 MG/1
1.65 CAPSULE ORAL EVERY 24 HOURS
Qty: 0 | Refills: 0 | Status: DISCONTINUED | OUTPATIENT
Start: 2017-12-16 | End: 2017-12-18

## 2017-12-16 RX ORDER — PIPERACILLIN AND TAZOBACTAM 4; .5 G/20ML; G/20ML
3.38 INJECTION, POWDER, LYOPHILIZED, FOR SOLUTION INTRAVENOUS EVERY 12 HOURS
Qty: 0 | Refills: 0 | Status: DISCONTINUED | OUTPATIENT
Start: 2017-12-17 | End: 2017-12-20

## 2017-12-16 RX ORDER — VANCOMYCIN HCL 1 G
1000 VIAL (EA) INTRAVENOUS EVERY 24 HOURS
Qty: 0 | Refills: 0 | Status: DISCONTINUED | OUTPATIENT
Start: 2017-12-17 | End: 2017-12-18

## 2017-12-16 RX ORDER — VANCOMYCIN HCL 1 G
1000 VIAL (EA) INTRAVENOUS ONCE
Qty: 0 | Refills: 0 | Status: COMPLETED | OUTPATIENT
Start: 2017-12-16 | End: 2017-12-16

## 2017-12-16 RX ORDER — HEPARIN SODIUM 5000 [USP'U]/ML
5000 INJECTION INTRAVENOUS; SUBCUTANEOUS EVERY 8 HOURS
Qty: 0 | Refills: 0 | Status: DISCONTINUED | OUTPATIENT
Start: 2017-12-16 | End: 2017-12-23

## 2017-12-16 RX ADMIN — Medication 250 MILLIGRAM(S): at 15:42

## 2017-12-16 RX ADMIN — AZITHROMYCIN 250 MILLIGRAM(S): 500 TABLET, FILM COATED ORAL at 21:56

## 2017-12-16 RX ADMIN — HEPARIN SODIUM 5000 UNIT(S): 5000 INJECTION INTRAVENOUS; SUBCUTANEOUS at 21:56

## 2017-12-16 RX ADMIN — PIPERACILLIN AND TAZOBACTAM 200 GRAM(S): 4; .5 INJECTION, POWDER, LYOPHILIZED, FOR SOLUTION INTRAVENOUS at 14:59

## 2017-12-16 NOTE — H&P ADULT - HISTORY OF PRESENT ILLNESS
72M PMH Prostate cancer (s/p 10 cycles Xtera, last chemo December 1st), ESRD s/p renal transplant w/CKDIII (2013, on Mycophenolate and tacrolimus, baseline Cr 1.2 – 1.5), HTN, who presents with worsening shortness of breath. The patient developed cough over the past 2-3 weeks which was productive of pink sputum. His shortness of breath developed and rapidly increased over the past 2-3 days to the point at which he reported difficulty speaking when he came in. At baseline, he walks >1 mile to the store every day w/o shortness of breath, but currently short of breath at rest. Denies any known cardiac history. Has mild LE swelling at baseline which he said may have increased slightly over the past 2 weeks. He denies fevers but endorses chills. He takes his immunosuppression regularly. He postponed his most-recent chemo session this past week due to his cough. He denies sick contacts.     Initial ED Vitals: T 97.6, HR 91, BP 96/35, RR 21, SpO2 83% on RA.    Initial Labs: WBC 21, Hgb 10 (baseline 9’s per patient), Plt 390, Na 142, K 4.7, BUN 72, Cr 2.23 (baseline 1.2 – 1.5), ProBNP 6552. VBG – pH 7.37, lactate 4.2, pCO2 33, pO2 24, HCO3 19.  Initial Imaging: CXR - Patchy bilateral opacities which may be secondary to assymetric pulmonary edema or multifocal pneumonia    Patient was placed on non-rebreather FiO2 100%. Vitals measured at HR 80, /61, RR 45, SpO2 77%. Patient was placed on Bipap with FiO2 70% and maintained SpO2 95 – 100%. Blood cultures were drawn and patient was given a dose of Vanc/Zosyn. 72M PMH Prostate cancer (s/p 10 cycles XTANDI, last chemo December 1st), ESRD s/p renal transplant w/CKDIII (2013, on Mycophenolate and tacrolimus, baseline Cr 1.2 – 1.5), HTN, who presents with worsening shortness of breath. The patient developed cough over the past 2-3 weeks which was productive of pink sputum. His shortness of breath developed and rapidly increased over the past 2-3 days to the point at which he reported difficulty speaking when he came in. At baseline, he walks >1 mile to the store every day w/o shortness of breath, but currently short of breath at rest. Denies any known cardiac history. Has mild LE swelling at baseline which he said may have increased slightly over the past 2 weeks. He denies fevers but endorses chills. He takes his immunosuppression regularly. He postponed his most-recent chemo session this past week due to his cough. He denies sick contacts.     Initial ED Vitals: T 97.6, HR 91, BP 96/35, RR 21, SpO2 83% on RA.    Initial Labs: WBC 21, Hgb 10 (baseline 9’s per patient), Plt 390, Na 142, K 4.7, BUN 72, Cr 2.23 (baseline 1.2 – 1.5), ProBNP 6552. VBG – pH 7.37, lactate 4.2, pCO2 33, pO2 24, HCO3 19.  Initial Imaging: CXR - Patchy bilateral opacities which may be secondary to assymetric pulmonary edema or multifocal pneumonia    Patient was placed on non-rebreather FiO2 100%. Vitals measured at HR 80, /61, RR 45, SpO2 77%. Patient was placed on Bipap with FiO2 70% and maintained SpO2 95 – 100%. Blood cultures were drawn and patient was given a dose of Vanc/Zosyn.

## 2017-12-16 NOTE — H&P ADULT - NSHPREVIEWOFSYSTEMS_GEN_ALL_CORE
Review of Systems:   CONSTITUTIONAL: No fever. + Chills  EYES: No eye pain, visual changes  ENMT:  No difficulty hearing, tinnitus, vertigo; No sinus or throat pain  NECK: No pain or stiffness  RESPIRATORY: + productive cough, + shortness of breath  CARDIOVASCULAR: + mildly acute on mildly chronic leg swelling bilateral. No chest pain, palpitations, dizziness  GASTROINTESTINAL: No abdominal or epigastric pain. No nausea, vomiting, or hematemesis; No diarrhea or constipation. No melena or hematochezia.  GENITOURINARY: No dysuria, frequency, hematuria, or incontinence  NEUROLOGICAL: No headaches, memory loss, loss of strength, numbness, or tremors  SKIN: No itching, burning, rashes, or lesions   LYMPH NODES: No enlarged glands  ENDOCRINE: No heat or cold intolerance; No hair loss  MUSCULOSKELETAL: No joint pain or swelling; No muscle, back, or extremity pain  PSYCHIATRIC: No depression, anxiety, mood swings, or difficulty sleeping  HEME/LYMPH: No easy bruising, or bleeding gums  ALLERY AND IMMUNOLOGIC: No hives or eczema

## 2017-12-16 NOTE — ED PROVIDER NOTE - MEDICAL DECISION MAKING DETAILS
Marimar: 72 M, prostate cancer, renal transplant, p/w slowly-progressive cough and SOB. Hypoxic for EMS and here. Labored respirations. Minimal LE edema. EDBUS w/ B lines. Rhonchorous lungs. Concern for volume overload or PNA. Less likely PE.

## 2017-12-16 NOTE — H&P ADULT - NSHPPHYSICALEXAM_GEN_ALL_CORE
.  VITAL SIGNS:  T(C): 36.4 (12-16-17 @ 13:47), Max: 36.4 (12-16-17 @ 13:47)  T(F): 97.6 (12-16-17 @ 13:47), Max: 97.6 (12-16-17 @ 13:47)  HR: 77 (12-16-17 @ 14:22) (77 - 91)  BP: 127/61 (12-16-17 @ 14:15) (95/35 - 127/61)  BP(mean): --  RR: 45 (12-16-17 @ 14:15) (32 - 45)  SpO2: 98% (12-16-17 @ 14:22) (77% - 98%)  Wt(kg): --    PHYSICAL EXAM:    Constitutional: Appears comfortable on bipap, no difficulty with speech, no intercostal muscle use but RR is very rapid.  Head: NC/AT  Eyes: PERRL, EOMI, anicteric sclera  ENT: no nasal discharge; uvula midline, no oropharyngeal erythema or exudates; MMM  Neck: supple; no JVD or thyromegaly  Respiratory: rhonchi bilaterally  Cardiac: +S1/S2; RRR  Gastrointestinal: soft, NT/ND  Back: spine midline, no bony tenderness or step-offs; no CVAT B/L  Extremities: + bilateral non-pitting edema, no clubbing or cyanosis  Musculoskeletal: NROM x4; no joint swelling, tenderness or erythema  Dermatologic: skin warm, dry and intact; no rashes, wounds, or scars  Lymphatic: no submandibular or cervical LAD  Neurologic: AAOx3; CNII-XII grossly intact; no focal deficits  Psychiatric: affect and characteristics of appearance, verbalizations, behaviors are appropriate

## 2017-12-16 NOTE — H&P ADULT - NSHPLABSRESULTS_GEN_ALL_CORE
All Labs personally reviewed: WBC 21, Hgb 10 (baseline 9’s per patient), Plt 390, Na 142, K 4.7, BUN 72, Cr 2.23 (baseline 1.2 – 1.5), ProBNP 6552. VBG – pH 7.37, lactate 4.2, pCO2 33, pO2 24, HCO3 19.    All Imaging personally reviewed: CXR - Patchy bilateral opacities which may be secondary to assymetric pulmonary edema or multifocal pneumonia

## 2017-12-16 NOTE — H&P ADULT - ASSESSMENT
72M PMH Prostate cancer (s/p 10 cycles Xtera, last chemo December 1st), ESRD s/p renal transplant w/CKDIII (2013, on Mycophenolate and tacrolimus, baseline Cr 1.2 – 1.5), HTN, who presents with 3 weeks of cough and 2 days of rapidly increasing SOB, found to be septic likely due to multifocal PNA with hypoxia to 78%    # Neuro: No issues    # Pulm: Significant hypoxia on RA and on non-rebreather. Stable oxygenation on bipap but rate of breathing remains rapid. Possible CHF in the setting of SOB with hypoxia, mild increase in LE swelling, and history of chemotherapy. However, given unequal consolidations, chills, cough x3 weeks, immunosuppression, and WBC 21 suspicion is much stronger for PNA. S/p Vanc/Zosyn in ED  - Follow-up BCx x2, sputum culture  - Obtain urine legionella  - Continue with Zosyn and Vanc by level. Start Azithromycin to cover for atypicals in setting of what appears to be multifocal PNA on CXR.    # Card: Last TTE in 2011 with EF 70% and normal LV/RV function. Possible CHF in setting of SOB, LE swelling, and elevated pro-BNP  - Strict I/O's, daily weights.  - Will obtain TTE. Will consider diuresis pending clinical status.    # GI - No issues    # Renal: S/p transplant in 2013 on immunosuppression with mycophenolate and tacrolimus. Baseline Cr 1.2-1.5. Cr 2.23 on admission. Likely pre-renal in setting of sepsis.  - Urinalysis, urine electrolytes.     # ID: Septic with leukocytosis 21 likely due to PNA. Will c/w Vanc/Zosyn at this time despite CAP as patient is severely  hypoxic with rapid breathing and significant leukocytosis. Will also obtain urine legionella and start azithromycin as X-ray suggestive of possible atypical PNA. Follow-up BCx and sputum cx    # Endocrine: No issues 72M PMH Prostate cancer (s/p 10 cycles Xtera, last chemo December 1st), ESRD s/p renal transplant w/CKDIII (2013, on Mycophenolate and tacrolimus, baseline Cr 1.2 – 1.5), HTN, who presents with 3 weeks of cough and 2 days of rapidly increasing SOB, found to be septic likely due to multifocal PNA with hypoxia to 78%    # Neuro: No issues    # Pulm: Significant hypoxia on RA and on non-rebreather. Stable oxygenation on bipap but rate of breathing remains rapid. Possible CHF in the setting of SOB with hypoxia, mild increase in LE swelling, and history of chemotherapy. However, given unequal consolidations, chills, cough x3 weeks, immunosuppression, and WBC 21 suspicion is much stronger for PNA. S/p Vanc/Zosyn in ED  - Follow-up BCx x2, sputum culture  - Obtain urine legionella  - Continue with Zosyn and Vanc by level. Start Azithromycin to cover for atypicals in setting of what appears to be multifocal PNA on CXR.    # Card: Last TTE in 2011 with EF 70% and normal LV/RV function. Possible CHF in setting of SOB, LE swelling, and elevated pro-BNP  - Strict I/O's, daily weights.  - Will obtain TTE. Will consider diuresis pending clinical status.  - Will hold home antihypertensives  - Continue with atorvastatin 10 mg    # GI - No issues    # Renal: S/p transplant in 2013 on immunosuppression with mycophenolate and tacrolimus. Baseline Cr 1.2-1.5. Cr 2.23 on admission. Likely pre-renal in setting of sepsis.  - Urinalysis, urine electrolytes.     # ID: Septic with leukocytosis 21 likely due to PNA. Will c/w Vanc/Zosyn at this time despite CAP as patient is severely  hypoxic with rapid breathing and significant leukocytosis. Will also obtain urine legionella and start azithromycin as X-ray suggestive of possible atypical PNA. Follow-up BCx and sputum cx    # Endocrine: No issues 72M PMH Prostate cancer (s/p 10 cycles XTANDI, last chemo December 1st), ESRD s/p renal transplant w/CKDIII (2013, on Mycophenolate and tacrolimus, baseline Cr 1.2 – 1.5), HTN, who presents with 3 weeks of cough and 2 days of rapidly increasing SOB, found to be septic likely due to multifocal PNA with hypoxia to 78%    # Neuro: No issues    # Pulm: Significant hypoxia on RA and on non-rebreather. Stable oxygenation on bipap but rate of breathing remains rapid. Possible CHF in the setting of SOB with hypoxia, mild increase in LE swelling, and history of chemotherapy. However, given unequal consolidations, chills, cough x3 weeks, immunosuppression, and WBC 21 suspicion is much stronger for PNA. S/p Vanc/Zosyn in ED  - Follow-up BCx x2, sputum culture  - Obtain urine legionella  - Continue with Zosyn and Vanc by level. Start Azithromycin to cover for atypicals in setting of what appears to be multifocal PNA on CXR.    # Card: Last TTE in 2011 with EF 70% and normal LV/RV function. Possible CHF in setting of SOB, LE swelling, and elevated pro-BNP  - Strict I/O's, daily weights.  - Will obtain TTE. Will consider diuresis pending clinical status.  - Will hold home antihypertensives  - Continue with atorvastatin 10 mg    # GI - No issues    # Renal: S/p transplant in 2013 on immunosuppression with mycophenolate and tacrolimus. Baseline Cr 1.2-1.5. Cr 2.23 on admission. Likely pre-renal in setting of sepsis.  - Urinalysis, urine electrolytes.     # ID: Septic with leukocytosis 21 likely due to PNA. Will c/w Vanc/Zosyn at this time despite CAP as patient is severely  hypoxic with rapid breathing and significant leukocytosis. Will also obtain urine legionella and start azithromycin as X-ray suggestive of possible atypical PNA. Follow-up BCx and sputum cx    # Endocrine: No issues

## 2017-12-16 NOTE — ED PROVIDER NOTE - ATTENDING CONTRIBUTION TO CARE
I performed a face-to-face evaluation of the patient and performed a history and physical examination. I agree with the history and physical examination.    Marimar: 72 M, prostate cancer, renal transplant, p/w slowly-progressive cough and SOB. Hypoxic for EMS and here. Labored respirations. Minimal LE edema. EDBUS w/ B lines. Rhonchorous lungs. Concern for volume overload or PNA. Less likely PE.

## 2017-12-16 NOTE — ED ADULT NURSE NOTE - OBJECTIVE STATEMENT
72 M, prostate cancer, on chemo, left chest wall port, renal transplant, p/w slowly-progressive cough and SOB. Hypoxic for EMS and here. Labored respirations. Minimal LE edema  Rhonchorous lungs. O2 Sat 77 on 100% NRB PIV placed BIPAP on STAT EKG completed will continue to monitor closely

## 2017-12-17 ENCOUNTER — RESULT REVIEW (OUTPATIENT)
Age: 72
End: 2017-12-17

## 2017-12-17 DIAGNOSIS — Z94.0 KIDNEY TRANSPLANT STATUS: ICD-10-CM

## 2017-12-17 DIAGNOSIS — N17.9 ACUTE KIDNEY FAILURE, UNSPECIFIED: ICD-10-CM

## 2017-12-17 LAB
ALBUMIN SERPL ELPH-MCNC: 2.6 G/DL — LOW (ref 3.3–5)
ALP SERPL-CCNC: 115 U/L — SIGNIFICANT CHANGE UP (ref 40–120)
ALT FLD-CCNC: 29 U/L — SIGNIFICANT CHANGE UP (ref 4–41)
APTT BLD: 25.1 SEC — LOW (ref 27.5–37.4)
AST SERPL-CCNC: 38 U/L — SIGNIFICANT CHANGE UP (ref 4–40)
B PERT DNA SPEC QL NAA+PROBE: SIGNIFICANT CHANGE UP
BASE EXCESS BLDA CALC-SCNC: -2.9 MMOL/L — SIGNIFICANT CHANGE UP
BASE EXCESS BLDA CALC-SCNC: -3.4 MMOL/L — SIGNIFICANT CHANGE UP
BASOPHILS # BLD AUTO: 0.03 K/UL — SIGNIFICANT CHANGE UP (ref 0–0.2)
BASOPHILS NFR BLD AUTO: 0.1 % — SIGNIFICANT CHANGE UP (ref 0–2)
BILIRUB SERPL-MCNC: 0.3 MG/DL — SIGNIFICANT CHANGE UP (ref 0.2–1.2)
BODY FLUID TYPE: SIGNIFICANT CHANGE UP
BUN SERPL-MCNC: 73 MG/DL — HIGH (ref 7–23)
BUN SERPL-MCNC: 80 MG/DL — HIGH (ref 7–23)
C PNEUM DNA SPEC QL NAA+PROBE: NOT DETECTED — SIGNIFICANT CHANGE UP
CALCIUM SERPL-MCNC: 8.8 MG/DL — SIGNIFICANT CHANGE UP (ref 8.4–10.5)
CALCIUM SERPL-MCNC: 9.1 MG/DL — SIGNIFICANT CHANGE UP (ref 8.4–10.5)
CHLORIDE BLDA-SCNC: 108 MMOL/L — SIGNIFICANT CHANGE UP (ref 96–108)
CHLORIDE BLDA-SCNC: 112 MMOL/L — HIGH (ref 96–108)
CHLORIDE SERPL-SCNC: 103 MMOL/L — SIGNIFICANT CHANGE UP (ref 98–107)
CHLORIDE SERPL-SCNC: 106 MMOL/L — SIGNIFICANT CHANGE UP (ref 98–107)
CLARITY SPEC: SIGNIFICANT CHANGE UP
CO2 SERPL-SCNC: 19 MMOL/L — LOW (ref 22–31)
CO2 SERPL-SCNC: 23 MMOL/L — SIGNIFICANT CHANGE UP (ref 22–31)
COLOR FLD: SIGNIFICANT CHANGE UP
CREAT SERPL-MCNC: 1.84 MG/DL — HIGH (ref 0.5–1.3)
CREAT SERPL-MCNC: 2.82 MG/DL — HIGH (ref 0.5–1.3)
CRYSTALS FLD MICRO: SIGNIFICANT CHANGE UP
EOSINOPHIL # BLD AUTO: 0 K/UL — SIGNIFICANT CHANGE UP (ref 0–0.5)
EOSINOPHIL NFR BLD AUTO: 0 % — SIGNIFICANT CHANGE UP (ref 0–6)
FLUAV H1 2009 PAND RNA SPEC QL NAA+PROBE: NOT DETECTED — SIGNIFICANT CHANGE UP
FLUAV H1 RNA SPEC QL NAA+PROBE: NOT DETECTED — SIGNIFICANT CHANGE UP
FLUAV H3 RNA SPEC QL NAA+PROBE: NOT DETECTED — SIGNIFICANT CHANGE UP
FLUAV SUBTYP SPEC NAA+PROBE: SIGNIFICANT CHANGE UP
FLUBV RNA SPEC QL NAA+PROBE: NOT DETECTED — SIGNIFICANT CHANGE UP
GLUCOSE BLDA-MCNC: 146 MG/DL — HIGH (ref 70–99)
GLUCOSE BLDA-MCNC: 175 MG/DL — HIGH (ref 70–99)
GLUCOSE SERPL-MCNC: 137 MG/DL — HIGH (ref 70–99)
GLUCOSE SERPL-MCNC: 202 MG/DL — HIGH (ref 70–99)
GRAM STN SPT: SIGNIFICANT CHANGE UP
HADV DNA SPEC QL NAA+PROBE: NOT DETECTED — SIGNIFICANT CHANGE UP
HCO3 BLDA-SCNC: 21 MMOL/L — LOW (ref 22–26)
HCO3 BLDA-SCNC: 22 MMOL/L — SIGNIFICANT CHANGE UP (ref 22–26)
HCOV 229E RNA SPEC QL NAA+PROBE: NOT DETECTED — SIGNIFICANT CHANGE UP
HCOV HKU1 RNA SPEC QL NAA+PROBE: NOT DETECTED — SIGNIFICANT CHANGE UP
HCOV NL63 RNA SPEC QL NAA+PROBE: NOT DETECTED — SIGNIFICANT CHANGE UP
HCOV OC43 RNA SPEC QL NAA+PROBE: NOT DETECTED — SIGNIFICANT CHANGE UP
HCT VFR BLD CALC: 31.1 % — LOW (ref 39–50)
HCT VFR BLDA CALC: 28.4 % — LOW (ref 39–51)
HCT VFR BLDA CALC: 29.6 % — LOW (ref 39–51)
HGB BLD-MCNC: 9.2 G/DL — LOW (ref 13–17)
HGB BLDA-MCNC: 9.2 G/DL — LOW (ref 13–17)
HGB BLDA-MCNC: 9.5 G/DL — LOW (ref 13–17)
HMPV RNA SPEC QL NAA+PROBE: NOT DETECTED — SIGNIFICANT CHANGE UP
HPIV1 RNA SPEC QL NAA+PROBE: NOT DETECTED — SIGNIFICANT CHANGE UP
HPIV2 RNA SPEC QL NAA+PROBE: NOT DETECTED — SIGNIFICANT CHANGE UP
HPIV3 RNA SPEC QL NAA+PROBE: NOT DETECTED — SIGNIFICANT CHANGE UP
HPIV4 RNA SPEC QL NAA+PROBE: NOT DETECTED — SIGNIFICANT CHANGE UP
IMM GRANULOCYTES # BLD AUTO: 0.32 # — SIGNIFICANT CHANGE UP
IMM GRANULOCYTES NFR BLD AUTO: 1.6 % — HIGH (ref 0–1.5)
INR BLD: 1.19 — HIGH (ref 0.88–1.17)
LACTATE BLDA-SCNC: 1.2 MMOL/L — SIGNIFICANT CHANGE UP (ref 0.5–2)
LACTATE BLDA-SCNC: 1.3 MMOL/L — SIGNIFICANT CHANGE UP (ref 0.5–2)
LYMPHOCYTES # BLD AUTO: 0.59 K/UL — LOW (ref 1–3.3)
LYMPHOCYTES # BLD AUTO: 2.9 % — LOW (ref 13–44)
LYMPHOCYTES NFR FLD: 16 % — SIGNIFICANT CHANGE UP
M PNEUMO DNA SPEC QL NAA+PROBE: NOT DETECTED — SIGNIFICANT CHANGE UP
MAGNESIUM SERPL-MCNC: 2.2 MG/DL — SIGNIFICANT CHANGE UP (ref 1.6–2.6)
MCHC RBC-ENTMCNC: 21.1 PG — LOW (ref 27–34)
MCHC RBC-ENTMCNC: 29.6 % — LOW (ref 32–36)
MCV RBC AUTO: 71.2 FL — LOW (ref 80–100)
MONOCYTES # BLD AUTO: 1.23 K/UL — HIGH (ref 0–0.9)
MONOCYTES # FLD: 36 % — SIGNIFICANT CHANGE UP
MONOCYTES NFR BLD AUTO: 6 % — SIGNIFICANT CHANGE UP (ref 2–14)
NEUTROPHILS # BLD AUTO: 18.32 K/UL — HIGH (ref 1.8–7.4)
NEUTROPHILS NFR BLD AUTO: 89.4 % — HIGH (ref 43–77)
NEUTS SEG NFR FLD MANUAL: 48 % — SIGNIFICANT CHANGE UP
NRBC # FLD: 0.09 — SIGNIFICANT CHANGE UP
NT-PROBNP SERPL-SCNC: 4650 PG/ML — SIGNIFICANT CHANGE UP
PCO2 BLDA: 30 MMHG — LOW (ref 35–48)
PCO2 BLDA: 47 MMHG — SIGNIFICANT CHANGE UP (ref 35–48)
PH BLDA: 7.31 PH — LOW (ref 7.35–7.45)
PH BLDA: 7.44 PH — SIGNIFICANT CHANGE UP (ref 7.35–7.45)
PHOSPHATE SERPL-MCNC: 5.4 MG/DL — HIGH (ref 2.5–4.5)
PLATELET # BLD AUTO: 298 K/UL — SIGNIFICANT CHANGE UP (ref 150–400)
PMV BLD: 9 FL — SIGNIFICANT CHANGE UP (ref 7–13)
PO2 BLDA: 51 MMHG — LOW (ref 83–108)
PO2 BLDA: 75 MMHG — LOW (ref 83–108)
POTASSIUM BLDA-SCNC: 4.3 MMOL/L — SIGNIFICANT CHANGE UP (ref 3.4–4.5)
POTASSIUM BLDA-SCNC: 4.4 MMOL/L — SIGNIFICANT CHANGE UP (ref 3.4–4.5)
POTASSIUM SERPL-MCNC: 4.7 MMOL/L — SIGNIFICANT CHANGE UP (ref 3.5–5.3)
POTASSIUM SERPL-MCNC: 5.2 MMOL/L — SIGNIFICANT CHANGE UP (ref 3.5–5.3)
POTASSIUM SERPL-SCNC: 4.7 MMOL/L — SIGNIFICANT CHANGE UP (ref 3.5–5.3)
POTASSIUM SERPL-SCNC: 5.2 MMOL/L — SIGNIFICANT CHANGE UP (ref 3.5–5.3)
PROT SERPL-MCNC: 5.2 G/DL — LOW (ref 6–8.3)
PROTHROM AB SERPL-ACNC: 13.2 SEC — HIGH (ref 9.8–13.1)
RBC # BLD: 4.37 M/UL — SIGNIFICANT CHANGE UP (ref 4.2–5.8)
RBC # FLD: 20.5 % — HIGH (ref 10.3–14.5)
RCV VOL RI: SIGNIFICANT CHANGE UP CELL/UL (ref 0–5)
RSV RNA SPEC QL NAA+PROBE: NOT DETECTED — SIGNIFICANT CHANGE UP
RV+EV RNA SPEC QL NAA+PROBE: NOT DETECTED — SIGNIFICANT CHANGE UP
SAO2 % BLDA: 76.8 % — LOW (ref 95–99)
SAO2 % BLDA: 95.6 % — SIGNIFICANT CHANGE UP (ref 95–99)
SODIUM BLDA-SCNC: 138 MMOL/L — SIGNIFICANT CHANGE UP (ref 136–146)
SODIUM BLDA-SCNC: 138 MMOL/L — SIGNIFICANT CHANGE UP (ref 136–146)
SODIUM SERPL-SCNC: 141 MMOL/L — SIGNIFICANT CHANGE UP (ref 135–145)
SODIUM SERPL-SCNC: 141 MMOL/L — SIGNIFICANT CHANGE UP (ref 135–145)
SPECIMEN SOURCE: SIGNIFICANT CHANGE UP
TOTAL CELLS COUNTED, BODY FLUID: 100 CELLS — SIGNIFICANT CHANGE UP
TOTAL NUCLEATED CELL COUNT, BODY FLUID: SIGNIFICANT CHANGE UP CELL/UL (ref 0–5)
VANCOMYCIN FLD-MCNC: 8.4 UG/ML — SIGNIFICANT CHANGE UP
WBC # BLD: 20.49 K/UL — HIGH (ref 3.8–10.5)
WBC # FLD AUTO: 20.49 K/UL — HIGH (ref 3.8–10.5)

## 2017-12-17 PROCEDURE — 88312 SPECIAL STAINS GROUP 1: CPT | Mod: 26

## 2017-12-17 PROCEDURE — 99291 CRITICAL CARE FIRST HOUR: CPT | Mod: 25

## 2017-12-17 PROCEDURE — 71010: CPT | Mod: 26

## 2017-12-17 PROCEDURE — 76604 US EXAM CHEST: CPT | Mod: 26

## 2017-12-17 PROCEDURE — 88112 CYTOPATH CELL ENHANCE TECH: CPT | Mod: 26

## 2017-12-17 PROCEDURE — 88305 TISSUE EXAM BY PATHOLOGIST: CPT | Mod: 26

## 2017-12-17 PROCEDURE — 93308 TTE F-UP OR LMTD: CPT | Mod: 26

## 2017-12-17 PROCEDURE — 99223 1ST HOSP IP/OBS HIGH 75: CPT | Mod: GC

## 2017-12-17 PROCEDURE — 31624 DX BRONCHOSCOPE/LAVAGE: CPT

## 2017-12-17 PROCEDURE — 71250 CT THORAX DX C-: CPT | Mod: 26

## 2017-12-17 PROCEDURE — 31500 INSERT EMERGENCY AIRWAY: CPT

## 2017-12-17 RX ORDER — AMLODIPINE BESYLATE 2.5 MG/1
1 TABLET ORAL
Qty: 0 | Refills: 0 | COMMUNITY

## 2017-12-17 RX ORDER — MIDAZOLAM HYDROCHLORIDE 1 MG/ML
12 INJECTION, SOLUTION INTRAMUSCULAR; INTRAVENOUS ONCE
Qty: 0 | Refills: 0 | Status: DISCONTINUED | OUTPATIENT
Start: 2017-12-17 | End: 2017-12-17

## 2017-12-17 RX ORDER — CHLORHEXIDINE GLUCONATE 213 G/1000ML
1 SOLUTION TOPICAL DAILY
Qty: 0 | Refills: 0 | Status: DISCONTINUED | OUTPATIENT
Start: 2017-12-17 | End: 2017-12-24

## 2017-12-17 RX ORDER — CISATRACURIUM BESYLATE 2 MG/ML
20 INJECTION INTRAVENOUS ONCE
Qty: 0 | Refills: 0 | Status: COMPLETED | OUTPATIENT
Start: 2017-12-17 | End: 2017-12-17

## 2017-12-17 RX ORDER — PROPOFOL 10 MG/ML
100 INJECTION, EMULSION INTRAVENOUS ONCE
Qty: 0 | Refills: 0 | Status: COMPLETED | OUTPATIENT
Start: 2017-12-17 | End: 2017-12-17

## 2017-12-17 RX ORDER — TACROLIMUS 5 MG/1
1 CAPSULE ORAL
Qty: 0 | Refills: 0 | COMMUNITY

## 2017-12-17 RX ORDER — FUROSEMIDE 40 MG
80 TABLET ORAL ONCE
Qty: 0 | Refills: 0 | Status: COMPLETED | OUTPATIENT
Start: 2017-12-17 | End: 2017-12-17

## 2017-12-17 RX ORDER — MAGNESIUM OXIDE 400 MG ORAL TABLET 241.3 MG
1 TABLET ORAL
Qty: 0 | Refills: 0 | COMMUNITY

## 2017-12-17 RX ORDER — MIDAZOLAM HYDROCHLORIDE 1 MG/ML
1 INJECTION, SOLUTION INTRAMUSCULAR; INTRAVENOUS
Qty: 100 | Refills: 0 | Status: DISCONTINUED | OUTPATIENT
Start: 2017-12-17 | End: 2017-12-18

## 2017-12-17 RX ORDER — MYCOPHENOLATE MOFETIL 250 MG/1
360 CAPSULE ORAL
Qty: 0 | Refills: 0 | COMMUNITY

## 2017-12-17 RX ORDER — NOREPINEPHRINE BITARTRATE/D5W 8 MG/250ML
0.1 PLASTIC BAG, INJECTION (ML) INTRAVENOUS
Qty: 8 | Refills: 0 | Status: DISCONTINUED | OUTPATIENT
Start: 2017-12-17 | End: 2017-12-18

## 2017-12-17 RX ORDER — PROPOFOL 10 MG/ML
50 INJECTION, EMULSION INTRAVENOUS
Qty: 1000 | Refills: 0 | Status: DISCONTINUED | OUTPATIENT
Start: 2017-12-17 | End: 2017-12-18

## 2017-12-17 RX ORDER — TACROLIMUS 5 MG/1
2 CAPSULE ORAL
Qty: 0 | Refills: 0 | COMMUNITY

## 2017-12-17 RX ORDER — ATENOLOL 25 MG/1
1 TABLET ORAL
Qty: 0 | Refills: 0 | COMMUNITY

## 2017-12-17 RX ORDER — ATORVASTATIN CALCIUM 80 MG/1
1 TABLET, FILM COATED ORAL
Qty: 0 | Refills: 0 | COMMUNITY

## 2017-12-17 RX ORDER — ENZALUTAMIDE 80 MG/1
0 TABLET ORAL
Qty: 0 | Refills: 0 | COMMUNITY

## 2017-12-17 RX ORDER — CHLORHEXIDINE GLUCONATE 213 G/1000ML
15 SOLUTION TOPICAL
Qty: 0 | Refills: 0 | Status: DISCONTINUED | OUTPATIENT
Start: 2017-12-17 | End: 2017-12-24

## 2017-12-17 RX ADMIN — MYCOPHENOLATE MOFETIL 41.5 GRAM(S): 250 CAPSULE ORAL at 06:02

## 2017-12-17 RX ADMIN — Medication 15.68 MICROGRAM(S)/KG/MIN: at 16:21

## 2017-12-17 RX ADMIN — PIPERACILLIN AND TAZOBACTAM 25 GRAM(S): 4; .5 INJECTION, POWDER, LYOPHILIZED, FOR SOLUTION INTRAVENOUS at 18:43

## 2017-12-17 RX ADMIN — CHLORHEXIDINE GLUCONATE 15 MILLILITER(S): 213 SOLUTION TOPICAL at 18:24

## 2017-12-17 RX ADMIN — MIDAZOLAM HYDROCHLORIDE 1 MG/HR: 1 INJECTION, SOLUTION INTRAMUSCULAR; INTRAVENOUS at 19:43

## 2017-12-17 RX ADMIN — TACROLIMUS 10.43 MILLIGRAM(S): 5 CAPSULE ORAL at 19:44

## 2017-12-17 RX ADMIN — PROPOFOL 100 MILLIGRAM(S): 10 INJECTION, EMULSION INTRAVENOUS at 10:05

## 2017-12-17 RX ADMIN — TACROLIMUS 10.43 MILLIGRAM(S): 5 CAPSULE ORAL at 05:45

## 2017-12-17 RX ADMIN — CISATRACURIUM BESYLATE 20 MILLIGRAM(S): 2 INJECTION INTRAVENOUS at 10:26

## 2017-12-17 RX ADMIN — PIPERACILLIN AND TAZOBACTAM 25 GRAM(S): 4; .5 INJECTION, POWDER, LYOPHILIZED, FOR SOLUTION INTRAVENOUS at 06:02

## 2017-12-17 RX ADMIN — AZITHROMYCIN 250 MILLIGRAM(S): 500 TABLET, FILM COATED ORAL at 18:23

## 2017-12-17 RX ADMIN — HEPARIN SODIUM 5000 UNIT(S): 5000 INJECTION INTRAVENOUS; SUBCUTANEOUS at 21:59

## 2017-12-17 RX ADMIN — PROPOFOL 25.08 MICROGRAM(S)/KG/MIN: 10 INJECTION, EMULSION INTRAVENOUS at 19:43

## 2017-12-17 RX ADMIN — HEPARIN SODIUM 5000 UNIT(S): 5000 INJECTION INTRAVENOUS; SUBCUTANEOUS at 06:02

## 2017-12-17 RX ADMIN — Medication 15.68 MICROGRAM(S)/KG/MIN: at 19:43

## 2017-12-17 RX ADMIN — MIDAZOLAM HYDROCHLORIDE 1 MG/HR: 1 INJECTION, SOLUTION INTRAMUSCULAR; INTRAVENOUS at 16:21

## 2017-12-17 RX ADMIN — PROPOFOL 25.08 MICROGRAM(S)/KG/MIN: 10 INJECTION, EMULSION INTRAVENOUS at 16:21

## 2017-12-17 RX ADMIN — HEPARIN SODIUM 5000 UNIT(S): 5000 INJECTION INTRAVENOUS; SUBCUTANEOUS at 15:00

## 2017-12-17 RX ADMIN — Medication 250 MILLIGRAM(S): at 16:24

## 2017-12-17 RX ADMIN — Medication 80 MILLIGRAM(S): at 09:39

## 2017-12-17 RX ADMIN — MIDAZOLAM HYDROCHLORIDE 12 MILLIGRAM(S): 1 INJECTION, SOLUTION INTRAMUSCULAR; INTRAVENOUS at 10:08

## 2017-12-17 RX ADMIN — ATORVASTATIN CALCIUM 10 MILLIGRAM(S): 80 TABLET, FILM COATED ORAL at 21:59

## 2017-12-17 NOTE — PROGRESS NOTE ADULT - SUBJECTIVE AND OBJECTIVE BOX
Interval events: Patient was unable to take last night's dose of mycophenolate and tacrolimus until 6 am due to inability to wean off bipap, eventually stable enough on high flow.    Review of Systems:  Constitutional: no fever, chills, fatigue  Neuro: no headache, numbness, weakness  Resp: no cough, wheezing, shortness of breath  CVS: no chest pain, palpitations, leg swelling  GI: no abdominal pain, nausea, vomiting, diarrhea   : no dysuria, frequency, incontinence  Skin: no itching, burning, rashes, or lesions   Msk: no joint pain or swelling  Psych: no depression, anxiety    T(F): 97.6 (12-17-17 @ 04:00), Max: 98.2 (12-16-17 @ 18:16)  HR: 73 (12-17-17 @ 06:00) (66 - 91)  BP: 125/57 (12-17-17 @ 06:00) (95/35 - 140/66)  RR: 27 (12-17-17 @ 06:00) (22 - 45)  SpO2: 97% (12-17-17 @ 06:00) (77% - 100%)  Wt(kg): --        CAPILLARY BLOOD GLUCOSE        I&O's Summary      Physical Exam:     Gen:  Neuro:  HEENT:  CV:  Pulm:  GI:  Ext:  Skin:    Meds:  heparin  Injectable 5000 Unit(s) SubCutaneous every 8 hours    azithromycin  IVPB      azithromycin  IVPB 500 milliGRAM(s) IV Intermittent every 24 hours  piperacillin/tazobactam IVPB. 3.375 Gram(s) IV Intermittent every 12 hours  vancomycin  IVPB 1000 milliGRAM(s) IV Intermittent every 24 hours      atorvastatin 10 milliGRAM(s) Oral at bedtime                mycophenolate mofetil IVPB 0.5 Gram(s) IV Intermittent every 12 hours  tacrolimus  IVPB 1.65 milliGRAM(s) IV Intermittent every 24 hours                                    9.2    20.49 )-----------( 298      ( 17 Dec 2017 06:32 )             31.1       12-17    141  |  106  |  73<H>  ----------------------------<  137<H>  4.7   |  19<L>  |  1.84<H>    Ca    9.1      17 Dec 2017 06:32  Phos  5.4     12-17  Mg     2.2     12-17    TPro  5.2<L>  /  Alb  2.6<L>  /  TBili  0.3  /  DBili  x   /  AST  38  /  ALT  29  /  AlkPhos  115  12-17      CARDIAC MARKERS ( 16 Dec 2017 14:19 )  x     / < 0.06 ng/mL / 41 u/L / 1.65 ng/mL / x          PT/INR - ( 17 Dec 2017 06:32 )   PT: 13.2 SEC;   INR: 1.19          PTT - ( 17 Dec 2017 06:32 )  PTT:25.1 SEC          ABG - ( 17 Dec 2017 06:32 )  pH: 7.44  /  pCO2: 30    /  pO2: 75    / HCO3: 22    / Base Excess: -3.4  /  SaO2: 95.6

## 2017-12-17 NOTE — PROGRESS NOTE ADULT - ASSESSMENT
72M PMH Prostate cancer (s/p 10 cycles Xtera, last chemo December 1st), ESRD s/p renal transplant w/CKDIII (2013, on Mycophenolate and tacrolimus, baseline Cr 1.2 – 1.5), HTN, who presents with 3 weeks of cough and 2 days of rapidly increasing SOB, found to be septic likely due to multifocal PNA with hypoxia to 78%    # Neuro: No issues    # Pulm: Significant hypoxia on RA and on non-rebreather. Stable oxygenation on bipap but rate of breathing remains rapid. Possible CHF in the setting of SOB with hypoxia, mild increase in LE swelling, and history of chemotherapy. However, given unequal consolidations, chills, cough x3 weeks, immunosuppression, and WBC 21 suspicion is much stronger for PNA. S/p Vanc/Zosyn in ED  - Follow-up BCx x2, sputum culture  - Obtain urine legionella  - Continue with Zosyn and Vanc by level. Start Azithromycin to cover for atypicals in setting of what appears to be multifocal PNA on CXR.    # Card: Last TTE in 2011 with EF 70% and normal LV/RV function. Possible CHF in setting of SOB, LE swelling, and elevated pro-BNP  - Strict I/O's, daily weights.  - Will obtain TTE. Will consider diuresis pending clinical status.  - Will hold home antihypertensives  - Continue with atorvastatin 10 mg    # GI - No issues    # Renal: S/p transplant in 2013 on immunosuppression with mycophenolate and tacrolimus. Baseline Cr 1.2-1.5. Cr 2.23 on admission, now improved to 1.84. Likely pre-renal in setting of sepsis.  - Urinalysis, urine electrolytes.     # ID: Septic with leukocytosis 21 likely due to PNA. Will c/w Vanc/Zosyn at this time despite CAP as patient is severely  hypoxic with rapid breathing and significant leukocytosis. Will also obtain urine legionella and start azithromycin as X-ray suggestive of possible atypical PNA. Follow-up BCx and sputum cx    # Endocrine: No issues 72M PMH Prostate cancer (s/p 10 cycles XTANDI, last chemo December 1st), ESRD s/p renal transplant w/CKDIII (2013, on Mycophenolate and tacrolimus, baseline Cr 1.2 – 1.5), HTN, who presents with 3 weeks of cough and 2 days of rapidly increasing SOB, found to be septic likely due to multifocal PNA with hypoxia to 78%    # Neuro: No issues    # Pulm: Significant hypoxia on RA and on non-rebreather. Stable oxygenation on bipap but rate of breathing remains rapid. Possible CHF in the setting of SOB with hypoxia, mild increase in LE swelling, and history of chemotherapy. However, given unequal consolidations, chills, cough x3 weeks, immunosuppression, and WBC 21 suspicion is much stronger for PNA. S/p Vanc/Zosyn in ED  - Follow-up BCx x2, sputum culture  - Obtain urine legionella  - Continue with Zosyn and Vanc by level. Start Azithromycin to cover for atypicals in setting of what appears to be multifocal PNA on CXR.    # Card: Last TTE in 2011 with EF 70% and normal LV/RV function. Possible CHF in setting of SOB, LE swelling, and elevated pro-BNP  - Strict I/O's, daily weights.  - Will obtain TTE. Will consider diuresis pending clinical status.  - Will hold home antihypertensives  - Continue with atorvastatin 10 mg    # GI - No issues    # Renal: S/p transplant in 2013 on immunosuppression with mycophenolate and tacrolimus. Baseline Cr 1.2-1.5. Cr 2.23 on admission, now improved to 1.84. Likely pre-renal in setting of sepsis.  - Urinalysis, urine electrolytes.     # ID: Septic with leukocytosis 21 likely due to PNA. Will c/w Vanc/Zosyn at this time despite CAP as patient is severely  hypoxic with rapid breathing and significant leukocytosis. Will also obtain urine legionella and start azithromycin as X-ray suggestive of possible atypical PNA. Follow-up BCx and sputum cx    # Endocrine: No issues

## 2017-12-17 NOTE — CONSULT NOTE ADULT - PROBLEM SELECTOR RECOMMENDATION 2
Patient with h/o DDRT in 2013: Patient currently on IV tacrolimus. Will hold cellcept for now in setting of sepsis. Monitor daily FK levels. Patient with kidney transplantation (DDRT) in 2013. Patient currently on IV tacrolimus. Cellcept on hold as patient with sepsis. Monitor tacrolimus level

## 2017-12-17 NOTE — CONSULT NOTE ADULT - SUBJECTIVE AND OBJECTIVE BOX
[facility] Division of Kidney Diseases & Hypertension  INITIAL CONSULT NOTE  [phone]--------------------------------------------------------------------------------  HPI:    72 year old male with h/o significant for h/o renal transplant in 2013, CKD stage III, prostate cancer, HTN presented with complaints of SOB and cough for the past 3 days. Patient is currently in MICU for management of sepsis secondary to pneumonia. Nephrology was consulted for elevated creatinine and management of immunosuppression. [facility] done on January 2013 at [facility]. Post transplant course was complicated by DGF and perforated bladder. Patient currently follows up with Dr. Luevano for transplant management. Previous labs showed Scr. of 1.36 on 7/24/15 and has ranged from 1.12-1.56 since then. Last Scr. done in nephrologist office is 1.37 on 10/30/17. During this hospitalization, latest Scr. was 1.84 on 12/17/17. He was on tacrolimus 2 mg q12h and Cellcept 360 mg BID. Currently he complains of SOB but denies complains of CP, abdominal pain, vomiting, diarrhea.     PAST HISTORY  --------------------------------------------------------------------------------  PAST MEDICAL & SURGICAL HISTORY:  Dyslipidemia  HTN (Hypertension)  CKD (Chronic Kidney Disease)  History of renal transplant: 2013  History of Prostatectomy  AVF (Arteriovenous Fistula)    FAMILY HISTORY:  No pertinent family history in first degree relatives    PAST SOCIAL HISTORY:    ALLERGIES & MEDICATIONS  --------------------------------------------------------------------------------  Allergies    No Known Allergies    Intolerances      Standing Inpatient Medications  atorvastatin 10 milliGRAM(s) Oral at bedtime  azithromycin  IVPB      azithromycin  IVPB 500 milliGRAM(s) IV Intermittent every 24 hours  heparin  Injectable 5000 Unit(s) SubCutaneous every 8 hours  piperacillin/tazobactam IVPB. 3.375 Gram(s) IV Intermittent every 12 hours  tacrolimus  IVPB 1.65 milliGRAM(s) IV Intermittent every 24 hours  vancomycin  IVPB 1000 milliGRAM(s) IV Intermittent every 24 hours    PRN Inpatient Medications      REVIEW OF SYSTEMS  --------------------------------------------------------------------------------  Gen: No  fevers  Skin: No rashes  Head/Eyes/Ears/Mouth: No headache  Respiratory: Dyspnea +  CV: No chest pain,   GI: No abdominal pain, nausea, vomiting  MSK: Edema present.   Neuro: No dizziness    All other systems were reviewed and are negative, except as noted.    VITALS/PHYSICAL EXAM  --------------------------------------------------------------------------------  T(C): 37.7 (12-17-17 @ 08:00), Max: 37.7 (12-17-17 @ 08:00)  HR: 77 (12-17-17 @ 10:05) (66 - 91)  BP: 134/48 (12-17-17 @ 10:05) (95/35 - 143/61)  RR: 24 (12-17-17 @ 10:05) (22 - 45)  SpO2: 99% (12-17-17 @ 10:05) (77% - 100%)  Wt(kg): --  Height (cm): 175.26 (12-16-17 @ 18:16)  Weight (kg): 83.6 (12-16-17 @ 18:16)  BMI (kg/m2): 27.2 (12-16-17 @ 18:16)  BSA (m2): 2 (12-16-17 @ 18:16)      Physical Exam:  	Gen: Elderly male; on BIPAP  	HEENT: no JVD  	Pulm: CTA B/L  	CV:  S1S2  	Abd: +BS, soft, allograft site non tender.   	Ext:  B/L Lower ext edema present.   	Neuro: No focal deficits  	Skin: Warm and dry   	Vascular access: Right AVF present; thrill present.     LABS/STUDIES  --------------------------------------------------------------------------------              9.2    20.49 >-----------<  298      [12-17-17 @ 06:32]              31.1     141  |  106  |  73  ----------------------------<  137      [12-17-17 @ 06:32]  4.7   |  19  |  1.84        Ca     9.1     [12-17-17 @ 06:32]      Mg     2.2     [12-17-17 @ 06:32]      Phos  5.4     [12-17-17 @ 06:32]    TPro  5.2  /  Alb  2.6  /  TBili  0.3  /  DBili  x   /  AST  38  /  ALT  29  /  AlkPhos  115  [12-17-17 @ 06:32]    PT/INR: PT 13.2 , INR 1.19       [12-17-17 @ 06:32]  PTT: 25.1       [12-17-17 @ 06:32]    Troponin < 0.06      [12-16-17 @ 14:19]  CK 41      [12-16-17 @ 14:19]    Creatinine Trend:  SCr 1.84 [12-17 @ 06:32]  SCr 2.23 [12-16 @ 14:19] [facility] Division of Kidney Diseases & Hypertension  INITIAL CONSULT NOTE  [phone]--------------------------------------------------------------------------------    HPI: 72-year-old male with h/o ESRD s/p kidney transplantation in 2013, CKD stage III, prostate cancer and HTN presented with complaints of SOB and cough for the past 3 days. Patient is currently in MICU for management of sepsis secondary to pneumonia. Nephrology was consulted for elevated Scr and and management of transplant immunosuppression. [facility] done on January 2013 at [facility]. Post transplant course was complicated by delayed graft function and perforated bladder. Patient currently follows with her outpatient nephrologist Dr. Luevano for kidney transplant management. Scr was 1.37 on labs done on 10/30/17. His outpatient transplant medications are: tacrolimus 2 mg PO BID, and Cellcept 360 mg PO BID. During this hospitalization, Scr was elevated at 2.23 yesterday however has decreased to 1.84 today.  Pt. seen and examined in MICU. Pt. c/o SOB but denies complains of CP, abdominal pain, vomiting or diarrhea.     PAST HISTORY  --------------------------------------------------------------------------------  PAST MEDICAL & SURGICAL HISTORY:  Dyslipidemia  HTN (Hypertension)  CKD (Chronic Kidney Disease)  History of renal transplant: 2013  History of Prostatectomy  AVF (Arteriovenous Fistula)    FAMILY HISTORY:  No pertinent family history in first degree relatives    PAST SOCIAL HISTORY:    ALLERGIES & MEDICATIONS  --------------------------------------------------------------------------------  Allergies    No Known Allergies    Intolerances    Standing Inpatient Medications  atorvastatin 10 milliGRAM(s) Oral at bedtime  azithromycin  IVPB      azithromycin  IVPB 500 milliGRAM(s) IV Intermittent every 24 hours  heparin  Injectable 5000 Unit(s) SubCutaneous every 8 hours  piperacillin/tazobactam IVPB. 3.375 Gram(s) IV Intermittent every 12 hours  tacrolimus  IVPB 1.65 milliGRAM(s) IV Intermittent every 24 hours  vancomycin  IVPB 1000 milliGRAM(s) IV Intermittent every 24 hours    PRN Inpatient Medications    REVIEW OF SYSTEMS  --------------------------------------------------------------------------------  Gen: No  fever  Skin: No rash  Head/Eyes/Ears/Mouth: No headache  Respiratory: Dyspnea +  CV: No chest pain   GI: No abdominal pain, nausea, vomiting  MSK: LE edema   Neuro: No dizziness    All other systems were reviewed and are negative, except as noted.    VITALS/PHYSICAL EXAM  --------------------------------------------------------------------------------  T(C): 37.7 (12-17-17 @ 08:00), Max: 37.7 (12-17-17 @ 08:00)  HR: 77 (12-17-17 @ 10:05) (66 - 91)  BP: 134/48 (12-17-17 @ 10:05) (95/35 - 143/61)  RR: 24 (12-17-17 @ 10:05) (22 - 45)  SpO2: 99% (12-17-17 @ 10:05) (77% - 100%)  Wt(kg): --  Height (cm): 175.26 (12-16-17 @ 18:16)  Weight (kg): 83.6 (12-16-17 @ 18:16)  BMI (kg/m2): 27.2 (12-16-17 @ 18:16)  BSA (m2): 2 (12-16-17 @ 18:16)      Physical Exam:  	Gen: Pt. on BIPAP (during rounds)  	HEENT: no JVD  	Pulm: fair air entry B/L  	CV: S1S2+  	Abd: +BS, soft, allograft site: non tender  	Ext: B/L LE edema present   	Skin: Warm  	Vascular access: Right AVF site: skin intact, thrill felt     LABS/STUDIES  --------------------------------------------------------------------------------              9.2    20.49 >-----------<  298      [12-17-17 @ 06:32]              31.1     141  |  106  |  73  ----------------------------<  137      [12-17-17 @ 06:32]  4.7   |  19  |  1.84        Ca     9.1     [12-17-17 @ 06:32]      Mg     2.2     [12-17-17 @ 06:32]      Phos  5.4     [12-17-17 @ 06:32]    TPro  5.2  /  Alb  2.6  /  TBili  0.3  /  DBili  x   /  AST  38  /  ALT  29  /  AlkPhos  115  [12-17-17 @ 06:32]    PT/INR: PT 13.2 , INR 1.19       [12-17-17 @ 06:32]  PTT: 25.1       [12-17-17 @ 06:32]    Troponin < 0.06      [12-16-17 @ 14:19]  CK 41      [12-16-17 @ 14:19]    Creatinine Trend:  SCr 1.84 [12-17 @ 06:32]  SCr 2.23 [12-16 @ 14:19]

## 2017-12-17 NOTE — CONSULT NOTE ADULT - ASSESSMENT
72 year old male with h/o DDRT in 2013, prostate cancer, HTN, HLD found to have PATRICIA on CKD in setting of sepsis. 72-year-old male with h/o kidney transplantation (DDRT in 2013), prostate cancer, HTN, and HLD found to have PATRICIA in setting of sepsis.

## 2017-12-17 NOTE — CONSULT NOTE ADULT - PROBLEM SELECTOR RECOMMENDATION 9
PATRICIA on CKD in setting of sepsis: On review of previous labs, baseline creatinine is between 1.2 and 1.5. Scr. on admission was 2.23 yesterday and is 1.84 today.  Patient with hemodynamically mediated PATRICIA in setting of sepsis? Check UA, urine urea, and urine creatinine. Check renal transplant ultrasound with doppler to assess allograft. Monitor Scr. and urine output. Avoid nephrotoxins. Pt. with PATRICIA in setting of sepsis. Scr was elevated at 2.23 yesterday however has decreased to 1.84 today. Patient with likely hemodynamically mediated PATRICIA in setting of sepsis. Check UA. Check transplanted kidney ultrasound with doppler. Monitor labs and urine output. Avoid any potential nephrotoxins

## 2017-12-18 ENCOUNTER — RESULT REVIEW (OUTPATIENT)
Age: 72
End: 2017-12-18

## 2017-12-18 DIAGNOSIS — E87.5 HYPERKALEMIA: ICD-10-CM

## 2017-12-18 LAB
ALBUMIN SERPL ELPH-MCNC: 2.6 G/DL — LOW (ref 3.3–5)
ALP SERPL-CCNC: 126 U/L — HIGH (ref 40–120)
ALT FLD-CCNC: 29 U/L — SIGNIFICANT CHANGE UP (ref 4–41)
APPEARANCE UR: SIGNIFICANT CHANGE UP
APTT BLD: 28.3 SEC — SIGNIFICANT CHANGE UP (ref 27.5–37.4)
AST SERPL-CCNC: 32 U/L — SIGNIFICANT CHANGE UP (ref 4–40)
BASOPHILS # BLD AUTO: 0.04 K/UL — SIGNIFICANT CHANGE UP (ref 0–0.2)
BASOPHILS NFR BLD AUTO: 0.1 % — SIGNIFICANT CHANGE UP (ref 0–2)
BILIRUB SERPL-MCNC: 0.2 MG/DL — SIGNIFICANT CHANGE UP (ref 0.2–1.2)
BILIRUB UR-MCNC: NEGATIVE — SIGNIFICANT CHANGE UP
BLOOD UR QL VISUAL: HIGH
BUN SERPL-MCNC: 81 MG/DL — HIGH (ref 7–23)
BUN SERPL-MCNC: 86 MG/DL — HIGH (ref 7–23)
CALCIUM SERPL-MCNC: 8.6 MG/DL — SIGNIFICANT CHANGE UP (ref 8.4–10.5)
CALCIUM SERPL-MCNC: 8.8 MG/DL — SIGNIFICANT CHANGE UP (ref 8.4–10.5)
CHLORIDE SERPL-SCNC: 102 MMOL/L — SIGNIFICANT CHANGE UP (ref 98–107)
CHLORIDE SERPL-SCNC: 103 MMOL/L — SIGNIFICANT CHANGE UP (ref 98–107)
CO2 SERPL-SCNC: 22 MMOL/L — SIGNIFICANT CHANGE UP (ref 22–31)
CO2 SERPL-SCNC: 23 MMOL/L — SIGNIFICANT CHANGE UP (ref 22–31)
COLOR SPEC: YELLOW — SIGNIFICANT CHANGE UP
CREAT ?TM UR-MCNC: 194.64 MG/DL — SIGNIFICANT CHANGE UP
CREAT SERPL-MCNC: 3.2 MG/DL — HIGH (ref 0.5–1.3)
CREAT SERPL-MCNC: 3.47 MG/DL — HIGH (ref 0.5–1.3)
CULTURE - ACID FAST SMEAR CONCENTRATED: SIGNIFICANT CHANGE UP
EOSINOPHIL # BLD AUTO: 0.01 K/UL — SIGNIFICANT CHANGE UP (ref 0–0.5)
EOSINOPHIL NFR BLD AUTO: 0 % — SIGNIFICANT CHANGE UP (ref 0–6)
GLUCOSE SERPL-MCNC: 166 MG/DL — HIGH (ref 70–99)
GLUCOSE SERPL-MCNC: 176 MG/DL — HIGH (ref 70–99)
GLUCOSE UR-MCNC: NEGATIVE — SIGNIFICANT CHANGE UP
HCT VFR BLD CALC: 30.9 % — LOW (ref 39–50)
HGB BLD-MCNC: 9.1 G/DL — LOW (ref 13–17)
IMM GRANULOCYTES # BLD AUTO: 0.8 # — SIGNIFICANT CHANGE UP
IMM GRANULOCYTES NFR BLD AUTO: 2.7 % — HIGH (ref 0–1.5)
INR BLD: 1.08 — SIGNIFICANT CHANGE UP (ref 0.88–1.17)
KETONES UR-MCNC: NEGATIVE — SIGNIFICANT CHANGE UP
LEUKOCYTE ESTERASE UR-ACNC: HIGH
LYMPHOCYTES # BLD AUTO: 0.82 K/UL — LOW (ref 1–3.3)
LYMPHOCYTES # BLD AUTO: 2.7 % — LOW (ref 13–44)
MAGNESIUM SERPL-MCNC: 2.5 MG/DL — SIGNIFICANT CHANGE UP (ref 1.6–2.6)
MCHC RBC-ENTMCNC: 21.9 PG — LOW (ref 27–34)
MCHC RBC-ENTMCNC: 29.4 % — LOW (ref 32–36)
MCV RBC AUTO: 74.3 FL — LOW (ref 80–100)
MONOCYTES # BLD AUTO: 1.4 K/UL — HIGH (ref 0–0.9)
MONOCYTES NFR BLD AUTO: 4.7 % — SIGNIFICANT CHANGE UP (ref 2–14)
MUCOUS THREADS # UR AUTO: SIGNIFICANT CHANGE UP
NEUTROPHILS # BLD AUTO: 26.93 K/UL — HIGH (ref 1.8–7.4)
NEUTROPHILS NFR BLD AUTO: 89.8 % — HIGH (ref 43–77)
NITRITE UR-MCNC: NEGATIVE — SIGNIFICANT CHANGE UP
NRBC # FLD: 0.21 — SIGNIFICANT CHANGE UP
PH UR: 5.5 — SIGNIFICANT CHANGE UP (ref 4.6–8)
PHOSPHATE SERPL-MCNC: 8.9 MG/DL — HIGH (ref 2.5–4.5)
PLATELET # BLD AUTO: 308 K/UL — SIGNIFICANT CHANGE UP (ref 150–400)
PMV BLD: 9.2 FL — SIGNIFICANT CHANGE UP (ref 7–13)
POTASSIUM SERPL-MCNC: 5.1 MMOL/L — SIGNIFICANT CHANGE UP (ref 3.5–5.3)
POTASSIUM SERPL-MCNC: 5.4 MMOL/L — HIGH (ref 3.5–5.3)
POTASSIUM SERPL-SCNC: 5.1 MMOL/L — SIGNIFICANT CHANGE UP (ref 3.5–5.3)
POTASSIUM SERPL-SCNC: 5.4 MMOL/L — HIGH (ref 3.5–5.3)
PREALB SERPL-MCNC: 5 MG/DL — LOW (ref 20–40)
PROT SERPL-MCNC: 5.2 G/DL — LOW (ref 6–8.3)
PROT UR-MCNC: 100 MG/DL — SIGNIFICANT CHANGE UP
PROTHROM AB SERPL-ACNC: 12 SEC — SIGNIFICANT CHANGE UP (ref 9.8–13.1)
RBC # BLD: 4.16 M/UL — LOW (ref 4.2–5.8)
RBC # FLD: 20.5 % — HIGH (ref 10.3–14.5)
RBC CASTS # UR COMP ASSIST: >50 — HIGH (ref 0–?)
SODIUM SERPL-SCNC: 141 MMOL/L — SIGNIFICANT CHANGE UP (ref 135–145)
SODIUM SERPL-SCNC: 142 MMOL/L — SIGNIFICANT CHANGE UP (ref 135–145)
SODIUM UR-SCNC: 27 MEQ/L — SIGNIFICANT CHANGE UP
SP GR SPEC: 1.02 — SIGNIFICANT CHANGE UP (ref 1–1.04)
SPECIMEN SOURCE: SIGNIFICANT CHANGE UP
SPECIMEN SOURCE: SIGNIFICANT CHANGE UP
SQUAMOUS # UR AUTO: SIGNIFICANT CHANGE UP
UROBILINOGEN FLD QL: NORMAL MG/DL — SIGNIFICANT CHANGE UP
UUN UR-MCNC: 310.7 MG/DL — SIGNIFICANT CHANGE UP
VANCOMYCIN FLD-MCNC: 16.7 UG/ML — SIGNIFICANT CHANGE UP
WBC # BLD: 30 K/UL — HIGH (ref 3.8–10.5)
WBC # FLD AUTO: 30 K/UL — HIGH (ref 3.8–10.5)
WBC CLUMPS #/AREA URNS HPF: PRESENT — HIGH (ref 0–?)
WBC UR QL: >50 — HIGH (ref 0–?)

## 2017-12-18 PROCEDURE — 88173 CYTOPATH EVAL FNA REPORT: CPT | Mod: 26

## 2017-12-18 PROCEDURE — 99233 SBSQ HOSP IP/OBS HIGH 50: CPT | Mod: GC

## 2017-12-18 PROCEDURE — 31628 BRONCHOSCOPY/LUNG BX EACH: CPT

## 2017-12-18 PROCEDURE — 88305 TISSUE EXAM BY PATHOLOGIST: CPT | Mod: 26

## 2017-12-18 PROCEDURE — 88312 SPECIAL STAINS GROUP 1: CPT | Mod: 26

## 2017-12-18 PROCEDURE — 99291 CRITICAL CARE FIRST HOUR: CPT | Mod: 25

## 2017-12-18 PROCEDURE — 71010: CPT | Mod: 26

## 2017-12-18 RX ORDER — PROPOFOL 10 MG/ML
50 INJECTION, EMULSION INTRAVENOUS
Qty: 1000 | Refills: 0 | Status: DISCONTINUED | OUTPATIENT
Start: 2017-12-18 | End: 2017-12-23

## 2017-12-18 RX ORDER — VANCOMYCIN HCL 1 G
750 VIAL (EA) INTRAVENOUS ONCE
Qty: 0 | Refills: 0 | Status: COMPLETED | OUTPATIENT
Start: 2017-12-18 | End: 2017-12-18

## 2017-12-18 RX ORDER — FENTANYL CITRATE 50 UG/ML
100 INJECTION INTRAVENOUS ONCE
Qty: 0 | Refills: 0 | Status: DISCONTINUED | OUTPATIENT
Start: 2017-12-18 | End: 2017-12-18

## 2017-12-18 RX ORDER — CISATRACURIUM BESYLATE 2 MG/ML
1 INJECTION INTRAVENOUS
Qty: 200 | Refills: 0 | Status: DISCONTINUED | OUTPATIENT
Start: 2017-12-18 | End: 2017-12-19

## 2017-12-18 RX ORDER — CISATRACURIUM BESYLATE 2 MG/ML
0.03 INJECTION INTRAVENOUS
Qty: 200 | Refills: 0 | Status: DISCONTINUED | OUTPATIENT
Start: 2017-12-18 | End: 2017-12-18

## 2017-12-18 RX ORDER — NOREPINEPHRINE BITARTRATE/D5W 8 MG/250ML
0.1 PLASTIC BAG, INJECTION (ML) INTRAVENOUS
Qty: 8 | Refills: 0 | Status: DISCONTINUED | OUTPATIENT
Start: 2017-12-18 | End: 2017-12-21

## 2017-12-18 RX ORDER — CISATRACURIUM BESYLATE 2 MG/ML
20 INJECTION INTRAVENOUS ONCE
Qty: 0 | Refills: 0 | Status: COMPLETED | OUTPATIENT
Start: 2017-12-18 | End: 2017-12-18

## 2017-12-18 RX ORDER — DEXTROSE 50 % IN WATER 50 %
50 SYRINGE (ML) INTRAVENOUS ONCE
Qty: 0 | Refills: 0 | Status: COMPLETED | OUTPATIENT
Start: 2017-12-18 | End: 2017-12-18

## 2017-12-18 RX ORDER — CISATRACURIUM BESYLATE 2 MG/ML
15 INJECTION INTRAVENOUS ONCE
Qty: 0 | Refills: 0 | Status: COMPLETED | OUTPATIENT
Start: 2017-12-18 | End: 2017-12-18

## 2017-12-18 RX ORDER — MIDAZOLAM HYDROCHLORIDE 1 MG/ML
3 INJECTION, SOLUTION INTRAMUSCULAR; INTRAVENOUS
Qty: 100 | Refills: 0 | Status: DISCONTINUED | OUTPATIENT
Start: 2017-12-18 | End: 2017-12-23

## 2017-12-18 RX ORDER — TACROLIMUS 5 MG/1
1.3 CAPSULE ORAL EVERY 24 HOURS
Qty: 0 | Refills: 0 | Status: DISCONTINUED | OUTPATIENT
Start: 2017-12-18 | End: 2017-12-18

## 2017-12-18 RX ORDER — SODIUM POLYSTYRENE SULFONATE 4.1 MEQ/G
30 POWDER, FOR SUSPENSION ORAL ONCE
Qty: 0 | Refills: 0 | Status: COMPLETED | OUTPATIENT
Start: 2017-12-18 | End: 2017-12-18

## 2017-12-18 RX ORDER — INSULIN HUMAN 100 [IU]/ML
10 INJECTION, SOLUTION SUBCUTANEOUS ONCE
Qty: 0 | Refills: 0 | Status: COMPLETED | OUTPATIENT
Start: 2017-12-18 | End: 2017-12-18

## 2017-12-18 RX ORDER — MIDAZOLAM HYDROCHLORIDE 1 MG/ML
6 INJECTION, SOLUTION INTRAMUSCULAR; INTRAVENOUS ONCE
Qty: 0 | Refills: 0 | Status: DISCONTINUED | OUTPATIENT
Start: 2017-12-18 | End: 2017-12-18

## 2017-12-18 RX ORDER — TACROLIMUS 5 MG/1
1.3 CAPSULE ORAL EVERY 24 HOURS
Qty: 0 | Refills: 0 | Status: DISCONTINUED | OUTPATIENT
Start: 2017-12-18 | End: 2017-12-19

## 2017-12-18 RX ADMIN — CISATRACURIUM BESYLATE 5.02 MICROGRAM(S)/KG/MIN: 2 INJECTION INTRAVENOUS at 21:26

## 2017-12-18 RX ADMIN — FENTANYL CITRATE 100 MICROGRAM(S): 50 INJECTION INTRAVENOUS at 20:20

## 2017-12-18 RX ADMIN — PROPOFOL 25.08 MICROGRAM(S)/KG/MIN: 10 INJECTION, EMULSION INTRAVENOUS at 07:14

## 2017-12-18 RX ADMIN — TACROLIMUS 10.43 MILLIGRAM(S): 5 CAPSULE ORAL at 01:13

## 2017-12-18 RX ADMIN — MIDAZOLAM HYDROCHLORIDE 1 MG/HR: 1 INJECTION, SOLUTION INTRAMUSCULAR; INTRAVENOUS at 07:14

## 2017-12-18 RX ADMIN — CISATRACURIUM BESYLATE 15 MILLIGRAM(S): 2 INJECTION INTRAVENOUS at 20:42

## 2017-12-18 RX ADMIN — Medication 1 APPLICATION(S): at 05:19

## 2017-12-18 RX ADMIN — Medication 15.68 MICROGRAM(S)/KG/MIN: at 07:14

## 2017-12-18 RX ADMIN — PROPOFOL 25.08 MICROGRAM(S)/KG/MIN: 10 INJECTION, EMULSION INTRAVENOUS at 01:13

## 2017-12-18 RX ADMIN — Medication 15.68 MICROGRAM(S)/KG/MIN: at 05:32

## 2017-12-18 RX ADMIN — TACROLIMUS 10.43 MILLIGRAM(S): 5 CAPSULE ORAL at 07:14

## 2017-12-18 RX ADMIN — Medication 50 MILLILITER(S): at 09:22

## 2017-12-18 RX ADMIN — PIPERACILLIN AND TAZOBACTAM 25 GRAM(S): 4; .5 INJECTION, POWDER, LYOPHILIZED, FOR SOLUTION INTRAVENOUS at 21:26

## 2017-12-18 RX ADMIN — CHLORHEXIDINE GLUCONATE 1 APPLICATION(S): 213 SOLUTION TOPICAL at 11:11

## 2017-12-18 RX ADMIN — Medication 150 MILLIGRAM(S): at 18:22

## 2017-12-18 RX ADMIN — ATORVASTATIN CALCIUM 10 MILLIGRAM(S): 80 TABLET, FILM COATED ORAL at 23:27

## 2017-12-18 RX ADMIN — INSULIN HUMAN 10 UNIT(S): 100 INJECTION, SOLUTION SUBCUTANEOUS at 09:22

## 2017-12-18 RX ADMIN — CHLORHEXIDINE GLUCONATE 15 MILLILITER(S): 213 SOLUTION TOPICAL at 05:19

## 2017-12-18 RX ADMIN — MIDAZOLAM HYDROCHLORIDE 3 MG/HR: 1 INJECTION, SOLUTION INTRAMUSCULAR; INTRAVENOUS at 20:05

## 2017-12-18 RX ADMIN — Medication 1 APPLICATION(S): at 17:21

## 2017-12-18 RX ADMIN — SODIUM POLYSTYRENE SULFONATE 30 GRAM(S): 4.1 POWDER, FOR SUSPENSION ORAL at 09:22

## 2017-12-18 RX ADMIN — CHLORHEXIDINE GLUCONATE 15 MILLILITER(S): 213 SOLUTION TOPICAL at 17:20

## 2017-12-18 RX ADMIN — TACROLIMUS 10.43 MILLIGRAM(S): 5 CAPSULE ORAL at 05:32

## 2017-12-18 RX ADMIN — TACROLIMUS 13.55 MILLIGRAM(S): 5 CAPSULE ORAL at 15:10

## 2017-12-18 RX ADMIN — MIDAZOLAM HYDROCHLORIDE 6 MILLIGRAM(S): 1 INJECTION, SOLUTION INTRAMUSCULAR; INTRAVENOUS at 16:13

## 2017-12-18 RX ADMIN — PROPOFOL 25.08 MICROGRAM(S)/KG/MIN: 10 INJECTION, EMULSION INTRAVENOUS at 05:31

## 2017-12-18 RX ADMIN — HEPARIN SODIUM 5000 UNIT(S): 5000 INJECTION INTRAVENOUS; SUBCUTANEOUS at 23:27

## 2017-12-18 RX ADMIN — HEPARIN SODIUM 5000 UNIT(S): 5000 INJECTION INTRAVENOUS; SUBCUTANEOUS at 05:06

## 2017-12-18 RX ADMIN — MIDAZOLAM HYDROCHLORIDE 1 MG/HR: 1 INJECTION, SOLUTION INTRAMUSCULAR; INTRAVENOUS at 05:32

## 2017-12-18 RX ADMIN — PIPERACILLIN AND TAZOBACTAM 25 GRAM(S): 4; .5 INJECTION, POWDER, LYOPHILIZED, FOR SOLUTION INTRAVENOUS at 05:31

## 2017-12-18 RX ADMIN — AZITHROMYCIN 250 MILLIGRAM(S): 500 TABLET, FILM COATED ORAL at 16:54

## 2017-12-18 RX ADMIN — Medication 15.68 MICROGRAM(S)/KG/MIN: at 20:05

## 2017-12-18 RX ADMIN — FENTANYL CITRATE 100 MICROGRAM(S): 50 INJECTION INTRAVENOUS at 20:04

## 2017-12-18 RX ADMIN — CISATRACURIUM BESYLATE 20 MILLIGRAM(S): 2 INJECTION INTRAVENOUS at 15:22

## 2017-12-18 RX ADMIN — PROPOFOL 25.08 MICROGRAM(S)/KG/MIN: 10 INJECTION, EMULSION INTRAVENOUS at 20:06

## 2017-12-18 NOTE — PROGRESS NOTE ADULT - SUBJECTIVE AND OBJECTIVE BOX
[facility] Division of Kidney Diseases & Hypertension  INITIAL CONSULT NOTE  [phone]--------------------------------------------------------------------------------    HPI: 72-year-old male with h/o ESRD s/p kidney transplantation in 2013, CKD stage III, prostate cancer and HTN presented with complaints of SOB and cough for the past 3 days. Patient is currently in MICU for management of sepsis secondary to pneumonia. Nephrology was consulted for elevated Scr and and management of transplant immunosuppression. [facility] done on January 2013 at [facility]. Post transplant course was complicated by delayed graft function and perforated bladder. Patient currently follows with her outpatient nephrologist Dr. Luevano for kidney transplant management. Scr was 1.37 on labs done on 10/30/17. His outpatient transplant medications are tacrolimus 2 mg PO BID, and Cellcept 360 mg PO BID. Patient seen and examined in MICU. He was intubated yesterday due to worsening ARDS.     PAST HISTORY  --------------------------------------------------------------------------------  PAST MEDICAL & SURGICAL HISTORY:  Dyslipidemia  HTN (Hypertension)  CKD (Chronic Kidney Disease)  History of renal transplant: 2013  History of Prostatectomy  AVF (Arteriovenous Fistula)    FAMILY HISTORY:  No pertinent family history in first degree relatives    PAST SOCIAL HISTORY:    ALLERGIES & MEDICATIONS  --------------------------------------------------------------------------------  Allergies    No Known Allergies    Intolerances      Standing Inpatient Medications  atorvastatin 10 milliGRAM(s) Oral at bedtime  azithromycin  IVPB      azithromycin  IVPB 500 milliGRAM(s) IV Intermittent every 24 hours  chlorhexidine 0.12% Liquid 15 milliLiter(s) Swish and Spit two times a day  chlorhexidine 4% Liquid 1 Application(s) Topical daily  heparin  Injectable 5000 Unit(s) SubCutaneous every 8 hours  midazolam Infusion 1 mG/Hr IV Continuous <Continuous>  norepinephrine Infusion 0.1 MICROgram(s)/kG/Min IV Continuous <Continuous>  petrolatum Ophthalmic Ointment 1 Application(s) Both EYES two times a day  piperacillin/tazobactam IVPB. 3.375 Gram(s) IV Intermittent every 12 hours  propofol Infusion 50 MICROgram(s)/kG/Min IV Continuous <Continuous>  tacrolimus  IVPB 1.3 milliGRAM(s) IV Intermittent every 24 hours  vancomycin  IVPB 1000 milliGRAM(s) IV Intermittent every 24 hours    PRN Inpatient Medications      REVIEW OF SYSTEMS  --------------------------------------------------------------------------------  Unable to obtain.     VITALS/PHYSICAL EXAM  --------------------------------------------------------------------------------  T(C): 36.4 (12-18-17 @ 08:00), Max: 37.2 (12-17-17 @ 12:00)  HR: 79 (12-18-17 @ 08:00) (66 - 82)  BP: 162/53 (12-18-17 @ 08:00) (109/48 - 162/53)  RR: 29 (12-18-17 @ 08:00) (11 - 36)  SpO2: 97% (12-18-17 @ 08:00) (84% - 99%)  Wt(kg): --  Height (cm): 175.26 (12-16-17 @ 18:16)  Weight (kg): 83.6 (12-16-17 @ 18:16)  BMI (kg/m2): 27.2 (12-16-17 @ 18:16)  BSA (m2): 2 (12-16-17 @ 18:16)      12-17-17 @ 07:01  -  12-18-17 @ 07:00  --------------------------------------------------------  IN: 1603 mL / OUT: 565 mL / NET: 1038 mL    12-18-17 @ 07:01  -  12-18-17 @ 09:25  --------------------------------------------------------  IN: 15.7 mL / OUT: 30 mL / NET: -14.3 mL      Physical Exam:    Gen:      Pt. intubated; on mechanical ventilator   	HEENT: no JVD  	Pulm: fair air entry B/L  	CV: S1S2+  	Abd: +BS, soft, allograft site: non tender  	Ext: B/L LE edema present   	Skin: Warm  	Vascular access: Right AVF site: skin intact, thrill felt       LABS/STUDIES  --------------------------------------------------------------------------------              9.1    30.00 >-----------<  308      [12-18-17 @ 03:40]              30.9     142  |  103  |  81  ----------------------------<  166      [12-18-17 @ 03:40]  5.4   |  23  |  3.20        Ca     8.8     [12-18-17 @ 03:40]      Mg     2.5     [12-18-17 @ 03:40]      Phos  8.9     [12-18-17 @ 03:40]    TPro  5.2  /  Alb  2.6  /  TBili  0.2  /  DBili  x   /  AST  32  /  ALT  29  /  AlkPhos  126  [12-18-17 @ 03:40]    PT/INR: PT 13.2 , INR 1.19       [12-17-17 @ 06:32]  PTT: 25.1       [12-17-17 @ 06:32]    Troponin < 0.06      [12-16-17 @ 14:19]  CK 41      [12-16-17 @ 14:19]    Creatinine Trend:  SCr 3.20 [12-18 @ 03:40]  SCr 2.82 [12-17 @ 18:59]  SCr 1.84 [12-17 @ 06:32]  SCr 2.23 [12-16 @ 14:19]

## 2017-12-18 NOTE — CHART NOTE - NSCHARTNOTEFT_GEN_A_CORE
Indication: hypoxemic respiratory failure and ARDS    Operators:  GERTRUDIS Abad and NICKI Trevizo    Pre-op Dx:  Hypoxemic Respiratory Failure    History:  prostate ca on chemo, kidney transplant on immunosuppressed    Preop medications:  propfol gtt, nimbex 20 mg iv x1    Findings:  Bronchoscope inserted through ETT. ETT noted to be in good position. Airway evaluation revealed normal mucosa throughout. No edema or inflammation noted. Performed sequential BAL aliquots in the LLL and RLL. No diffuse alveolar hemorrhage.  Bronchoscope then withdrawn from ETT.    Specimens:  sent for cell count  cultures - bacterial, fungal and AFB  cytology

## 2017-12-18 NOTE — PROGRESS NOTE ADULT - SUBJECTIVE AND OBJECTIVE BOX
Interval events: Intubated yesterday for suspicion of ARDS. Bronchoscopy performed. Decreasing UOP in evening and during night    Review of Systems:  Limited by intubation and clinical status.    T(F): 97.5 (12-18-17 @ 04:00), Max: 99.8 (12-17-17 @ 08:00)  HR: 79 (12-18-17 @ 07:42) (66 - 82)  BP: 150/50 (12-18-17 @ 07:00) (109/48 - 151/49)  RR: 32 (12-18-17 @ 07:23) (11 - 36)  SpO2: 96% (12-18-17 @ 07:42) (84% - 99%)  Wt(kg): --    Mode: AC/ CMV (Assist Control/ Continuous Mandatory Ventilation), RR (machine): 24, TV (machine): 400, FiO2: 60, PEEP: 12    CAPILLARY BLOOD GLUCOSE        I&O's Summary    17 Dec 2017 07:01  -  18 Dec 2017 07:00  --------------------------------------------------------  IN: 1603 mL / OUT: 565 mL / NET: 1038 mL    18 Dec 2017 07:01  -  18 Dec 2017 07:53  --------------------------------------------------------  IN: 15.7 mL / OUT: 30 mL / NET: -14.3 mL        Physical Exam:     Gen:  Neuro:  HEENT:  CV:  Pulm:  GI:  Ext:  Skin:    Meds:  heparin  Injectable 5000 Unit(s) SubCutaneous every 8 hours    azithromycin  IVPB      azithromycin  IVPB 500 milliGRAM(s) IV Intermittent every 24 hours  piperacillin/tazobactam IVPB. 3.375 Gram(s) IV Intermittent every 12 hours  vancomycin  IVPB 1000 milliGRAM(s) IV Intermittent every 24 hours    norepinephrine Infusion 0.1 MICROgram(s)/kG/Min IV Continuous <Continuous>    atorvastatin 10 milliGRAM(s) Oral at bedtime      midazolam Infusion 1 mG/Hr IV Continuous <Continuous>  propofol Infusion 50 MICROgram(s)/kG/Min IV Continuous <Continuous>            tacrolimus  IVPB 1.65 milliGRAM(s) IV Intermittent every 24 hours    chlorhexidine 0.12% Liquid 15 milliLiter(s) Swish and Spit two times a day  chlorhexidine 4% Liquid 1 Application(s) Topical daily  petrolatum Ophthalmic Ointment 1 Application(s) Both EYES two times a day    sodium polystyrene sulfonate Suspension 30 Gram(s) Oral once                                9.1    30.00 )-----------( 308      ( 18 Dec 2017 03:40 )             30.9       12-18    142  |  103  |  81<H>  ----------------------------<  166<H>  5.4<H>   |  23  |  3.20<H>    Ca    8.8      18 Dec 2017 03:40  Phos  8.9     12-18  Mg     2.5     12-18    TPro  5.2<L>  /  Alb  2.6<L>  /  TBili  0.2  /  DBili  x   /  AST  32  /  ALT  29  /  AlkPhos  126<H>  12-18      CARDIAC MARKERS ( 16 Dec 2017 14:19 )  x     / < 0.06 ng/mL / 41 u/L / 1.65 ng/mL / x          PT/INR - ( 17 Dec 2017 06:32 )   PT: 13.2 SEC;   INR: 1.19          PTT - ( 17 Dec 2017 06:32 )  PTT:25.1 SEC    BRONCHIAL LAVAGE -- -- 12-17 @ 11:17  BLOOD PERIPHERAL -- -- 12-16 @ 14:18        ABG - ( 17 Dec 2017 10:55 )  pH: 7.31  /  pCO2: 47    /  pO2: 51    / HCO3: 21    / Base Excess: -2.9  /  SaO2: 76.8 Interval events: Intubated yesterday for suspicion of ARDS. Bronchoscopy performed. Decreasing UOP in evening and during night    Review of Systems:  Limited by intubation and clinical status.    T(F): 97.5 (12-18-17 @ 04:00), Max: 99.8 (12-17-17 @ 08:00)  HR: 79 (12-18-17 @ 07:42) (66 - 82)  BP: 150/50 (12-18-17 @ 07:00) (109/48 - 151/49)  RR: 32 (12-18-17 @ 07:23) (11 - 36)  SpO2: 96% (12-18-17 @ 07:42) (84% - 99%)  Wt(kg): --    Mode: AC/ CMV (Assist Control/ Continuous Mandatory Ventilation), RR (machine): 24, TV (machine): 400, FiO2: 60, PEEP: 12    CAPILLARY BLOOD GLUCOSE        I&O's Summary    17 Dec 2017 07:01  -  18 Dec 2017 07:00  --------------------------------------------------------  IN: 1603 mL / OUT: 565 mL / NET: 1038 mL    18 Dec 2017 07:01  -  18 Dec 2017 07:53  --------------------------------------------------------  IN: 15.7 mL / OUT: 30 mL / NET: -14.3 mL        Physical Exam:     Gen: Intubated, sedated  Neuro: Sedated  HEENT: ET tube in place, PERRL  CV: RRR  Pulm: Intubated, no changes  GI: BS+, nondistended  Ext: mild non-pitting edema  Skin: No rash or break in skin    Meds:  heparin  Injectable 5000 Unit(s) SubCutaneous every 8 hours    azithromycin  IVPB      azithromycin  IVPB 500 milliGRAM(s) IV Intermittent every 24 hours  piperacillin/tazobactam IVPB. 3.375 Gram(s) IV Intermittent every 12 hours  vancomycin  IVPB 1000 milliGRAM(s) IV Intermittent every 24 hours    norepinephrine Infusion 0.1 MICROgram(s)/kG/Min IV Continuous <Continuous>    atorvastatin 10 milliGRAM(s) Oral at bedtime      midazolam Infusion 1 mG/Hr IV Continuous <Continuous>  propofol Infusion 50 MICROgram(s)/kG/Min IV Continuous <Continuous>            tacrolimus  IVPB 1.65 milliGRAM(s) IV Intermittent every 24 hours    chlorhexidine 0.12% Liquid 15 milliLiter(s) Swish and Spit two times a day  chlorhexidine 4% Liquid 1 Application(s) Topical daily  petrolatum Ophthalmic Ointment 1 Application(s) Both EYES two times a day    sodium polystyrene sulfonate Suspension 30 Gram(s) Oral once                                9.1    30.00 )-----------( 308      ( 18 Dec 2017 03:40 )             30.9       12-18    142  |  103  |  81<H>  ----------------------------<  166<H>  5.4<H>   |  23  |  3.20<H>    Ca    8.8      18 Dec 2017 03:40  Phos  8.9     12-18  Mg     2.5     12-18    TPro  5.2<L>  /  Alb  2.6<L>  /  TBili  0.2  /  DBili  x   /  AST  32  /  ALT  29  /  AlkPhos  126<H>  12-18      CARDIAC MARKERS ( 16 Dec 2017 14:19 )  x     / < 0.06 ng/mL / 41 u/L / 1.65 ng/mL / x          PT/INR - ( 17 Dec 2017 06:32 )   PT: 13.2 SEC;   INR: 1.19          PTT - ( 17 Dec 2017 06:32 )  PTT:25.1 SEC    BRONCHIAL LAVAGE -- -- 12-17 @ 11:17  BLOOD PERIPHERAL -- -- 12-16 @ 14:18        ABG - ( 17 Dec 2017 10:55 )  pH: 7.31  /  pCO2: 47    /  pO2: 51    / HCO3: 21    / Base Excess: -2.9  /  SaO2: 76.8

## 2017-12-18 NOTE — PROGRESS NOTE ADULT - ASSESSMENT
72M PMH Prostate cancer (s/p 10 cycles XTANDI, last chemo December 1st), ESRD s/p renal transplant w/CKDIII (2013, on Mycophenolate and tacrolimus, baseline Cr 1.2 – 1.5), HTN, who presents with 3 weeks of cough and 2 days of rapidly increasing SOB, found to be septic likely due to multifocal PNA with hypoxia to 78%    # Neuro: Sedated, intubated    # Pulm: Intubated 12/17 for likely ARDS, presumed infectious etiology.   - Follow-up BCx x2, sputum culture  - Obtain urine legionella  - Continue with Zosyn and Vanc by level. Start Azithromycin to cover for atypicals in setting of what appears to be multifocal PNA on CXR.    # Card: Last TTE in 2011 with EF 70% and normal LV/RV function. Possible CHF in setting of SOB, LE swelling, and elevated pro-BNP  - Strict I/O's, daily weights.  - Will obtain TTE. Will consider diuresis pending clinical status.  - Will hold home antihypertensives  - Continue with atorvastatin 10 mg    # GI - No issues. OG tube placed    # Renal: S/p transplant in 2013 on immunosuppression with mycophenolate and tacrolimus. Baseline Cr 1.2-1.5. Cr 2.23 on admission, now 3.20. Likely pre-renal in setting of sepsis.  - Urinalysis, urine electrolytes.     # ID: Septic with leukocytosis 21 likely due to PNA. Will c/w Vanc/Zosyn at this time despite CAP as patient is severely  hypoxic with rapid breathing and significant leukocytosis. Will also obtain urine legionella and start azithromycin as X-ray suggestive of possible atypical PNA. Follow-up BCx and sputum cx. Suspicion for PCP in setting of immunosuppression.    # Endocrine: No issues 72M PMH Prostate cancer (s/p 10 cycles XTANDI, last chemo December 1st), ESRD s/p renal transplant w/CKDIII (2013, on Mycophenolate and tacrolimus, baseline Cr 1.2 – 1.5), HTN, who presents with 3 weeks of cough and 2 days of rapidly increasing SOB, found to be septic likely due to multifocal PNA with hypoxia to 78%    # Neuro: Sedated, intubated    # Pulm: Intubated 12/17 for likely ARDS, presumed infectious etiology. PLAN FOR BRONCHIAL Bx today  - Follow-up BCx x2, sputum culture  - Obtain urine legionella  - Continue with Zosyn and Vanc by level. Start Azithromycin to cover for atypicals in setting of what appears to be multifocal PNA on CXR.    # Card: Last TTE in 2011 with EF 70% and normal LV/RV function. Possible CHF in setting of SOB, LE swelling, and elevated pro-BNP  - Strict I/O's, daily weights.  - Will obtain TTE. Will consider diuresis pending clinical status.  - Will hold home antihypertensives  - Continue with atorvastatin 10 mg    # GI - No issues. OG tube placed    # Renal: S/p transplant in 2013 on immunosuppression with mycophenolate and tacrolimus. Baseline Cr 1.2-1.5. Cr 2.23 on admission, now 3.20. Likely pre-renal in setting of sepsis.  - Urinalysis, urine electrolytes.     # ID: Septic with leukocytosis 21 likely due to PNA. Will c/w Vanc/Zosyn at this time despite CAP as patient is severely  hypoxic with rapid breathing and significant leukocytosis. Will also obtain urine legionella and start azithromycin as X-ray suggestive of possible atypical PNA. Follow-up BCx and sputum cx. Suspicion for PCP in setting of immunosuppression.    # Endocrine: No issues

## 2017-12-18 NOTE — PROGRESS NOTE ADULT - PROBLEM SELECTOR PLAN 3
Hyperkalemia in setting of PATRICIA and tacrolimus use: Serum K+ noted to be 5.4. Monitor serum K+,. Hyperkalemia in setting of PATRICIA and tacrolimus use: Serum K+ noted to be 5.4. Monitor serum K+,.  Medical optimization.

## 2017-12-18 NOTE — PROGRESS NOTE ADULT - PROBLEM SELECTOR PLAN 2
Patient with kidney transplantation (DDRT) in 2013. Will decrease tacrolimus to 1.3 mg in view of worsening creatinine and hyperkalemia. Cellcept on hold as patient with sepsis. Monitor tacrolimus level. Patient with kidney transplantation (DDRT) in . Will decrease tacrolimus to 1.3 mg in view of worsening creatinine and hyperkalemia. Cellcept on hold as patient with sepsis. Monitor random tacrolimus level.  Check medications for interactions through cy pathway.

## 2017-12-18 NOTE — PROGRESS NOTE ADULT - ASSESSMENT
72-year-old male with h/o kidney transplantation (DDRT in 2013), prostate cancer, HTN, and HLD found to have PATRICIA in setting of sepsis.

## 2017-12-18 NOTE — PROCEDURE NOTE - NSTRACHPOSTINTU_RESP_A_CORE
Appropriate capnography/Chest X-Ray/Positive end tidal Co2 noted/Breath sounds equal/Breath sounds bilateral

## 2017-12-18 NOTE — PROGRESS NOTE ADULT - PROBLEM SELECTOR PLAN 1
PATRICIA in setting of sepsis: Scr. is 3.20 today which has worsened from yesterday. Patient is on IV vasopressors. Patient is oliguric(  cc). Patient with hemodynamically mediated PATRICIA from sepsis? vs. tacrolimus? Plan is to decrease IV tacrolimus to 1.3 mg and check tacrolimus level today. Monitor Scr. and urine output. PATRICIA in setting of sepsis: Scr. is 3.20 today which has worsened from yesterday. Patient is on IV vasopressors. Patient is oliguric(  cc). Patient with hemodynamically mediated PATRICIA from sepsis? vs. tacrolimus? Plan is to decrease IV tacrolimus to 1.3 mg and check tacrolimus level today (based on home dose of 2 BID). Monitor Scr. and urine output.

## 2017-12-18 NOTE — CHART NOTE - NSCHARTNOTEFT_GEN_A_CORE
Indication: hypoxic respiratory failure with abnormal CT chest findings    Operators: Edgar Pleletier    Pre-op Dx: pneumonia    History: prostate ca    Preop medications: versed 6 mg IV x1, nimbex 20 mg IV x 1    Xray/CT Findings: GGO bilaterally; bilateral opacities, most dense in RLL    Findings:  Bronchoscope inserted through ETT. ETT noted to be in good position. No endobronchial lesions were noted.  Transbronchial biopsy forceps were inserted through the bronchoscope into the RLL.  The PEEP on the ventilator was turned down to 0, and a transbronchial biopsy was taken in RLL.  Real time lung ultrasound showed lung sliding post transbronchial biopsy.  The PEEP was returned 10 cm H20 and O2 saturation was in low 90s.  A second transbronchial biopsy was taken in the RLL after taking the PEEP to 0 cm H20 and there was lung sliding post-biopsy.  The PEEP was returned 10 cm H20 and O2 saturation was in low 90s. A third transbronchial biopsy was taken in the RLL after taking the PEEP to 0 cm H20, and there was absence of lung sliding on the right post biopsy with lung point on lung ultrasound.  The PEEP was turned down to 8 cm H20 to maintain an O2 saturation in the low 90s.  Patient was normotensive.  An 18 gauge needle was inserted in the 4th intercostal space on the right in the midclavicular line, but there was no definite release of air.  Needle was withdrawn, and there was still lung point seen on lung ultrasound.  Patient remained normotensive, and oxygen saturation actually improved to 99% on a PEEP of 8 cm H20 and FIO2 of 100%.  Chest xray showed questionable loculated pneumothorax versus pneumomediastinum.  Given the patient was hemodynamically stable, the decision was made not to put in a chest tube post-procedure, but to just monitor.  Family was informed.    Specimens: none

## 2017-12-19 ENCOUNTER — MOBILE ON CALL (OUTPATIENT)
Age: 72
End: 2017-12-19

## 2017-12-19 LAB
ALBUMIN SERPL ELPH-MCNC: 2.5 G/DL — LOW (ref 3.3–5)
ALP SERPL-CCNC: 125 U/L — HIGH (ref 40–120)
ALT FLD-CCNC: 29 U/L — SIGNIFICANT CHANGE UP (ref 4–41)
APTT BLD: 26.1 SEC — LOW (ref 27.5–37.4)
AST SERPL-CCNC: 29 U/L — SIGNIFICANT CHANGE UP (ref 4–40)
BASE EXCESS BLDA CALC-SCNC: -6.6 MMOL/L — SIGNIFICANT CHANGE UP
BASE EXCESS BLDA CALC-SCNC: -6.7 MMOL/L — SIGNIFICANT CHANGE UP
BASOPHILS # BLD AUTO: 0.05 K/UL — SIGNIFICANT CHANGE UP (ref 0–0.2)
BASOPHILS NFR BLD AUTO: 0.2 % — SIGNIFICANT CHANGE UP (ref 0–2)
BILIRUB SERPL-MCNC: 0.2 MG/DL — SIGNIFICANT CHANGE UP (ref 0.2–1.2)
BUN SERPL-MCNC: 92 MG/DL — HIGH (ref 7–23)
CALCIUM SERPL-MCNC: 8.4 MG/DL — SIGNIFICANT CHANGE UP (ref 8.4–10.5)
CHLORIDE BLDA-SCNC: 105 MMOL/L — SIGNIFICANT CHANGE UP (ref 96–108)
CHLORIDE BLDA-SCNC: 105 MMOL/L — SIGNIFICANT CHANGE UP (ref 96–108)
CHLORIDE SERPL-SCNC: 99 MMOL/L — SIGNIFICANT CHANGE UP (ref 98–107)
CMV IGG FLD QL: <0.2 U/ML — SIGNIFICANT CHANGE UP
CMV IGG SERPL-IMP: NEGATIVE — SIGNIFICANT CHANGE UP
CMV IGM FLD-ACNC: <8 AU/ML — SIGNIFICANT CHANGE UP
CMV IGM SERPL QL: NEGATIVE — SIGNIFICANT CHANGE UP
CO2 SERPL-SCNC: 19 MMOL/L — LOW (ref 22–31)
CREAT BLDA-MCNC: 4.69 MG/DL — HIGH (ref 0.5–1.3)
CREAT SERPL-MCNC: 4.35 MG/DL — HIGH (ref 0.5–1.3)
EOSINOPHIL # BLD AUTO: 0.07 K/UL — SIGNIFICANT CHANGE UP (ref 0–0.5)
EOSINOPHIL NFR BLD AUTO: 0.2 % — SIGNIFICANT CHANGE UP (ref 0–6)
GLUCOSE BLDA-MCNC: 177 MG/DL — HIGH (ref 70–99)
GLUCOSE BLDA-MCNC: 187 MG/DL — HIGH (ref 70–99)
GLUCOSE SERPL-MCNC: 172 MG/DL — HIGH (ref 70–99)
HCO3 BLDA-SCNC: 18 MMOL/L — LOW (ref 22–26)
HCO3 BLDA-SCNC: 19 MMOL/L — LOW (ref 22–26)
HCT VFR BLD CALC: 30.9 % — LOW (ref 39–50)
HCT VFR BLDA CALC: 27.8 % — LOW (ref 39–51)
HCT VFR BLDA CALC: 28.4 % — LOW (ref 39–51)
HGB BLD-MCNC: 9.2 G/DL — LOW (ref 13–17)
HGB BLDA-MCNC: 9 G/DL — LOW (ref 13–17)
HGB BLDA-MCNC: 9.2 G/DL — LOW (ref 13–17)
IMM GRANULOCYTES # BLD AUTO: 1.1 # — SIGNIFICANT CHANGE UP
IMM GRANULOCYTES NFR BLD AUTO: 3.8 % — HIGH (ref 0–1.5)
INR BLD: 1.05 — SIGNIFICANT CHANGE UP (ref 0.88–1.17)
L PNEUMO AG UR QL: NEGATIVE — SIGNIFICANT CHANGE UP
LACTATE BLDA-SCNC: 1.1 MMOL/L — SIGNIFICANT CHANGE UP (ref 0.5–2)
LACTATE BLDA-SCNC: 1.2 MMOL/L — SIGNIFICANT CHANGE UP (ref 0.5–2)
LYMPHOCYTES # BLD AUTO: 1.26 K/UL — SIGNIFICANT CHANGE UP (ref 1–3.3)
LYMPHOCYTES # BLD AUTO: 4.4 % — LOW (ref 13–44)
MAGNESIUM SERPL-MCNC: 2.4 MG/DL — SIGNIFICANT CHANGE UP (ref 1.6–2.6)
MCHC RBC-ENTMCNC: 22.3 PG — LOW (ref 27–34)
MCHC RBC-ENTMCNC: 29.8 % — LOW (ref 32–36)
MCV RBC AUTO: 75 FL — LOW (ref 80–100)
MONOCYTES # BLD AUTO: 1.78 K/UL — HIGH (ref 0–0.9)
MONOCYTES NFR BLD AUTO: 6.2 % — SIGNIFICANT CHANGE UP (ref 2–14)
NEUTROPHILS # BLD AUTO: 24.65 K/UL — HIGH (ref 1.8–7.4)
NEUTROPHILS NFR BLD AUTO: 85.2 % — HIGH (ref 43–77)
NON-GYNECOLOGICAL CYTOLOGY STUDY: SIGNIFICANT CHANGE UP
NRBC # FLD: 0.18 — SIGNIFICANT CHANGE UP
PCO2 BLDA: 44 MMHG — SIGNIFICANT CHANGE UP (ref 35–48)
PCO2 BLDA: 54 MMHG — HIGH (ref 35–48)
PH BLDA: 7.2 PH — CRITICAL LOW (ref 7.35–7.45)
PH BLDA: 7.26 PH — LOW (ref 7.35–7.45)
PHOSPHATE SERPL-MCNC: 9.3 MG/DL — HIGH (ref 2.5–4.5)
PLATELET # BLD AUTO: 326 K/UL — SIGNIFICANT CHANGE UP (ref 150–400)
PMV BLD: 9.7 FL — SIGNIFICANT CHANGE UP (ref 7–13)
PO2 BLDA: 107 MMHG — SIGNIFICANT CHANGE UP (ref 83–108)
PO2 BLDA: 113 MMHG — HIGH (ref 83–108)
POTASSIUM BLDA-SCNC: 4.6 MMOL/L — HIGH (ref 3.4–4.5)
POTASSIUM BLDA-SCNC: 4.6 MMOL/L — HIGH (ref 3.4–4.5)
POTASSIUM SERPL-MCNC: 4.9 MMOL/L — SIGNIFICANT CHANGE UP (ref 3.5–5.3)
POTASSIUM SERPL-SCNC: 4.9 MMOL/L — SIGNIFICANT CHANGE UP (ref 3.5–5.3)
PROT SERPL-MCNC: 5.2 G/DL — LOW (ref 6–8.3)
PROTHROM AB SERPL-ACNC: 12.1 SEC — SIGNIFICANT CHANGE UP (ref 9.8–13.1)
RBC # BLD: 4.12 M/UL — LOW (ref 4.2–5.8)
RBC # FLD: 20.9 % — HIGH (ref 10.3–14.5)
SAO2 % BLDA: 97.1 % — SIGNIFICANT CHANGE UP (ref 95–99)
SAO2 % BLDA: 97.3 % — SIGNIFICANT CHANGE UP (ref 95–99)
SODIUM BLDA-SCNC: 133 MMOL/L — LOW (ref 136–146)
SODIUM BLDA-SCNC: 133 MMOL/L — LOW (ref 136–146)
SODIUM SERPL-SCNC: 138 MMOL/L — SIGNIFICANT CHANGE UP (ref 135–145)
SPECIMEN SOURCE: SIGNIFICANT CHANGE UP
TACROLIMUS SERPL-MCNC: 25.9 — SIGNIFICANT CHANGE UP
VANCOMYCIN FLD-MCNC: 25.3 UG/ML — CRITICAL HIGH
WBC # BLD: 28.91 K/UL — HIGH (ref 3.8–10.5)
WBC # FLD AUTO: 28.91 K/UL — HIGH (ref 3.8–10.5)

## 2017-12-19 PROCEDURE — 99233 SBSQ HOSP IP/OBS HIGH 50: CPT | Mod: GC

## 2017-12-19 PROCEDURE — 99291 CRITICAL CARE FIRST HOUR: CPT

## 2017-12-19 PROCEDURE — 71010: CPT | Mod: 26

## 2017-12-19 RX ORDER — TACROLIMUS 5 MG/1
0.5 CAPSULE ORAL EVERY 12 HOURS
Qty: 0 | Refills: 0 | Status: DISCONTINUED | OUTPATIENT
Start: 2017-12-19 | End: 2017-12-19

## 2017-12-19 RX ORDER — TACROLIMUS 5 MG/1
2 CAPSULE ORAL EVERY 12 HOURS
Qty: 0 | Refills: 0 | Status: DISCONTINUED | OUTPATIENT
Start: 2017-12-19 | End: 2017-12-19

## 2017-12-19 RX ORDER — TACROLIMUS 5 MG/1
1.5 CAPSULE ORAL EVERY 12 HOURS
Qty: 0 | Refills: 0 | Status: DISCONTINUED | OUTPATIENT
Start: 2017-12-20 | End: 2017-12-20

## 2017-12-19 RX ORDER — CISATRACURIUM BESYLATE 2 MG/ML
1.8 INJECTION INTRAVENOUS
Qty: 200 | Refills: 0 | Status: DISCONTINUED | OUTPATIENT
Start: 2017-12-19 | End: 2017-12-21

## 2017-12-19 RX ADMIN — Medication 15.68 MICROGRAM(S)/KG/MIN: at 09:17

## 2017-12-19 RX ADMIN — CHLORHEXIDINE GLUCONATE 15 MILLILITER(S): 213 SOLUTION TOPICAL at 06:33

## 2017-12-19 RX ADMIN — HEPARIN SODIUM 5000 UNIT(S): 5000 INJECTION INTRAVENOUS; SUBCUTANEOUS at 22:32

## 2017-12-19 RX ADMIN — Medication 15.68 MICROGRAM(S)/KG/MIN: at 19:16

## 2017-12-19 RX ADMIN — Medication 50 MILLIGRAM(S): at 22:32

## 2017-12-19 RX ADMIN — CISATRACURIUM BESYLATE 5.02 MICROGRAM(S)/KG/MIN: 2 INJECTION INTRAVENOUS at 09:16

## 2017-12-19 RX ADMIN — HEPARIN SODIUM 5000 UNIT(S): 5000 INJECTION INTRAVENOUS; SUBCUTANEOUS at 06:34

## 2017-12-19 RX ADMIN — PROPOFOL 25.08 MICROGRAM(S)/KG/MIN: 10 INJECTION, EMULSION INTRAVENOUS at 09:15

## 2017-12-19 RX ADMIN — PROPOFOL 25.08 MICROGRAM(S)/KG/MIN: 10 INJECTION, EMULSION INTRAVENOUS at 19:16

## 2017-12-19 RX ADMIN — AZITHROMYCIN 250 MILLIGRAM(S): 500 TABLET, FILM COATED ORAL at 17:17

## 2017-12-19 RX ADMIN — MIDAZOLAM HYDROCHLORIDE 3 MG/HR: 1 INJECTION, SOLUTION INTRAMUSCULAR; INTRAVENOUS at 09:16

## 2017-12-19 RX ADMIN — PIPERACILLIN AND TAZOBACTAM 25 GRAM(S): 4; .5 INJECTION, POWDER, LYOPHILIZED, FOR SOLUTION INTRAVENOUS at 09:45

## 2017-12-19 RX ADMIN — Medication 1 APPLICATION(S): at 17:17

## 2017-12-19 RX ADMIN — ATORVASTATIN CALCIUM 10 MILLIGRAM(S): 80 TABLET, FILM COATED ORAL at 22:32

## 2017-12-19 RX ADMIN — Medication 1 TABLET(S): at 17:46

## 2017-12-19 RX ADMIN — PIPERACILLIN AND TAZOBACTAM 25 GRAM(S): 4; .5 INJECTION, POWDER, LYOPHILIZED, FOR SOLUTION INTRAVENOUS at 20:15

## 2017-12-19 RX ADMIN — MIDAZOLAM HYDROCHLORIDE 3 MG/HR: 1 INJECTION, SOLUTION INTRAMUSCULAR; INTRAVENOUS at 19:15

## 2017-12-19 RX ADMIN — Medication 50 MILLIGRAM(S): at 09:45

## 2017-12-19 RX ADMIN — Medication 1 APPLICATION(S): at 06:34

## 2017-12-19 RX ADMIN — HEPARIN SODIUM 5000 UNIT(S): 5000 INJECTION INTRAVENOUS; SUBCUTANEOUS at 14:11

## 2017-12-19 RX ADMIN — CHLORHEXIDINE GLUCONATE 1 APPLICATION(S): 213 SOLUTION TOPICAL at 10:31

## 2017-12-19 RX ADMIN — CHLORHEXIDINE GLUCONATE 15 MILLILITER(S): 213 SOLUTION TOPICAL at 17:17

## 2017-12-19 RX ADMIN — CISATRACURIUM BESYLATE 9.03 MICROGRAM(S)/KG/MIN: 2 INJECTION INTRAVENOUS at 10:32

## 2017-12-19 RX ADMIN — CISATRACURIUM BESYLATE 9.03 MICROGRAM(S)/KG/MIN: 2 INJECTION INTRAVENOUS at 19:17

## 2017-12-19 NOTE — CONSULT NOTE ADULT - SUBJECTIVE AND OBJECTIVE BOX
HPI:  72-year-old male with h/o ESRD s/p kidney transplantation in 2013, CKD stage III, prostate cancer and HTN admitted  with complaints of SOB and cough on 12/16/17. Patient is currently in MICU, intubated for management of sepsis secondary to pneumonia. He is followed for h/o prostate cancer since 2006. He had prostate surgery in 2007. Was on Zoladex till 2015 with good response. Since then he has been thru casodex, Xtandi, Zytiga and is on taxotere now apparently with PSA response. 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        PAST MEDICAL & SURGICAL HISTORY:  Dyslipidemia  HTN (Hypertension)  CKD (Chronic Kidney Disease)  History of renal transplant: 2013  History of Prostatectomy  AVF (Arteriovenous Fistula)    Meds:  atorvastatin 10 milliGRAM(s) Oral at bedtime  azithromycin  IVPB      azithromycin  IVPB 500 milliGRAM(s) IV Intermittent every 24 hours  chlorhexidine 0.12% Liquid 15 milliLiter(s) Swish and Spit two times a day  chlorhexidine 4% Liquid 1 Application(s) Topical daily  cisatracurium Infusion 1.8 MICROgram(s)/kG/Min IV Continuous <Continuous>  heparin  Injectable 5000 Unit(s) SubCutaneous every 8 hours  methylPREDNISolone sodium succinate Injectable 50 milliGRAM(s) IV Push every 12 hours  midazolam Infusion 3 mG/Hr IV Continuous <Continuous>  norepinephrine Infusion 0.1 MICROgram(s)/kG/Min IV Continuous <Continuous>  petrolatum Ophthalmic Ointment 1 Application(s) Both EYES two times a day  piperacillin/tazobactam IVPB. 3.375 Gram(s) IV Intermittent every 12 hours  propofol Infusion 50 MICROgram(s)/kG/Min IV Continuous <Continuous>  tacrolimus 2 milliGRAM(s) Oral every 12 hours  trimethoprim  160 mG/sulfamethoxazole 800 mG 1 Tablet(s) Oral every 12 hours    Labs:  CBC Full  -  ( 19 Dec 2017 02:57 )  WBC Count : 28.91 K/uL  Hemoglobin : 9.2 g/dL  Hematocrit : 30.9 %  Platelet Count - Automated : 326 K/uL  Mean Cell Volume : 75.0 fL  Mean Cell Hemoglobin : 22.3 pg  Mean Cell Hemoglobin Concentration : 29.8 %  Auto Neutrophil # : 24.65 K/uL  Auto Lymphocyte # : 1.26 K/uL  Auto Monocyte # : 1.78 K/uL  Auto Eosinophil # : 0.07 K/uL  Auto Basophil # : 0.05 K/uL  Auto Neutrophil % : 85.2 %  Auto Lymphocyte % : 4.4 %  Auto Monocyte % : 6.2 %  Auto Eosinophil % : 0.2 %  Auto Basophil % : 0.2 %    12-19    138  |  99  |  92<H>  ----------------------------<  172<H>  4.9   |  19<L>  |  4.35<H>    Ca    8.4      19 Dec 2017 02:55  Phos  9.3     12-19  Mg     2.4     12-19    TPro  5.2<L>  /  Alb  2.5<L>  /  TBili  0.2  /  DBili  x   /  AST  29  /  ALT  29  /  AlkPhos  125<H>  12-19      Radiology:             ROS:  No pain, no fever  No lumps in neck or pain  No Sob or chest pain  No palpitations   No abdominal pain. No change in bowel habit. No blood in stools  No weakness in extremities  No leg swelling      Vital Signs Last 24 Hrs  T(C): 36.9 (19 Dec 2017 12:00), Max: 37.5 (19 Dec 2017 08:00)  T(F): 98.5 (19 Dec 2017 12:00), Max: 99.5 (19 Dec 2017 08:00)  HR: 82 (19 Dec 2017 14:00) (76 - 92)  BP: 156/56 (19 Dec 2017 14:00) (85/49 - 156/56)  BP(mean): 79 (19 Dec 2017 14:00) (56 - 79)  RR: 23 (19 Dec 2017 14:00) (17 - 36)  SpO2: 96% (19 Dec 2017 14:00) (85% - 100%)    Physical Exam:  Patient comfortable  AXOX3  Neck supple, no LN's  Chest: bilateral breath sounds, no wheeze or rales  CVS: regular heart rate without murmur  Abdomen: soft, BS+, no massess or organomegaly  CNS: no gross deficit  Ext: no edema HPI:  72-year-old male with h/o ESRD s/p kidney transplantation in 2013, CKD stage III, prostate cancer and HTN admitted  with complaints of SOB and cough on 12/16/17. Patient is currently in MICU, intubated and medically paralysed.   He is followed for h/o prostate cancer since 2006. He had prostate surgery in 2007. Was on Zoladex till 2015 with good response. Since then he has been thru casodex, Xtandi, Zytiga and is on taxotere now apparently with PSA response. 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        PAST MEDICAL & SURGICAL HISTORY:  Dyslipidemia  HTN (Hypertension)  CKD (Chronic Kidney Disease)  History of renal transplant: 2013  History of Prostatectomy  AVF (Arteriovenous Fistula)    Meds:  atorvastatin 10 milliGRAM(s) Oral at bedtime  azithromycin  IVPB      azithromycin  IVPB 500 milliGRAM(s) IV Intermittent every 24 hours  chlorhexidine 0.12% Liquid 15 milliLiter(s) Swish and Spit two times a day  chlorhexidine 4% Liquid 1 Application(s) Topical daily  cisatracurium Infusion 1.8 MICROgram(s)/kG/Min IV Continuous <Continuous>  heparin  Injectable 5000 Unit(s) SubCutaneous every 8 hours  methylPREDNISolone sodium succinate Injectable 50 milliGRAM(s) IV Push every 12 hours  midazolam Infusion 3 mG/Hr IV Continuous <Continuous>  norepinephrine Infusion 0.1 MICROgram(s)/kG/Min IV Continuous <Continuous>  petrolatum Ophthalmic Ointment 1 Application(s) Both EYES two times a day  piperacillin/tazobactam IVPB. 3.375 Gram(s) IV Intermittent every 12 hours  propofol Infusion 50 MICROgram(s)/kG/Min IV Continuous <Continuous>  tacrolimus 2 milliGRAM(s) Oral every 12 hours  trimethoprim  160 mG/sulfamethoxazole 800 mG 1 Tablet(s) Oral every 12 hours    Labs:  CBC Full  -  ( 19 Dec 2017 02:57 )  WBC Count : 28.91 K/uL  Hemoglobin : 9.2 g/dL  Hematocrit : 30.9 %  Platelet Count - Automated : 326 K/uL  Mean Cell Volume : 75.0 fL  Mean Cell Hemoglobin : 22.3 pg  Mean Cell Hemoglobin Concentration : 29.8 %  Auto Neutrophil # : 24.65 K/uL  Auto Lymphocyte # : 1.26 K/uL  Auto Monocyte # : 1.78 K/uL  Auto Eosinophil # : 0.07 K/uL  Auto Basophil # : 0.05 K/uL  Auto Neutrophil % : 85.2 %  Auto Lymphocyte % : 4.4 %  Auto Monocyte % : 6.2 %  Auto Eosinophil % : 0.2 %  Auto Basophil % : 0.2 %    12-19    138  |  99  |  92<H>  ----------------------------<  172<H>  4.9   |  19<L>  |  4.35<H>    Ca    8.4      19 Dec 2017 02:55  Phos  9.3     12-19  Mg     2.4     12-19    TPro  5.2<L>  /  Alb  2.5<L>  /  TBili  0.2  /  DBili  x   /  AST  29  /  ALT  29  /  AlkPhos  125<H>  12-19      Radiology:             ROS:  Intubated and medically paralysed      Vital Signs Last 24 Hrs  T(C): 36.9 (19 Dec 2017 12:00), Max: 37.5 (19 Dec 2017 08:00)  T(F): 98.5 (19 Dec 2017 12:00), Max: 99.5 (19 Dec 2017 08:00)  HR: 82 (19 Dec 2017 14:00) (76 - 92)  BP: 156/56 (19 Dec 2017 14:00) (85/49 - 156/56)  BP(mean): 79 (19 Dec 2017 14:00) (56 - 79)  RR: 23 (19 Dec 2017 14:00) (17 - 36)  SpO2: 96% (19 Dec 2017 14:00) (85% - 100%)    Physical Exam:  Patient intubated.  Conducted sounds  Abdomen soft

## 2017-12-19 NOTE — PROGRESS NOTE ADULT - SUBJECTIVE AND OBJECTIVE BOX
[facility] Division of Kidney Diseases & Hypertension  FOLLOW UP NOTE  [phone]--------------------------------------------------------------------------------    72-year-old male with h/o ESRD s/p kidney transplantation in 2013, CKD stage III, prostate cancer and HTN presented with complaints of SOB and cough for the past 3 days. Patient is currently in MICU for management of sepsis secondary to pneumonia. Nephrology was consulted for elevated Scr and and management of transplant immunosuppression. [facility] done on January 2013 at [facility]. Post transplant course was complicated by delayed graft function and perforated bladder. Patient currently follows with her outpatient nephrologist Dr. Luevano for kidney transplant management. Scr was 1.37 on labs done on 10/30/17. His outpatient transplant medications are tacrolimus 2 mg PO BID, and Cellcept 360 mg PO BID.  He was intubated on 12/7/17 due to worsening ARDS. Patient seen and examined in MICU; currently intubated and sedated.     PAST HISTORY  --------------------------------------------------------------------------------  No significant changes to PMH, PSH, FHx, SHx, unless otherwise noted    ALLERGIES & MEDICATIONS  --------------------------------------------------------------------------------  Allergies    No Known Allergies    Intolerances      Standing Inpatient Medications  atorvastatin 10 milliGRAM(s) Oral at bedtime  azithromycin  IVPB      azithromycin  IVPB 500 milliGRAM(s) IV Intermittent every 24 hours  chlorhexidine 0.12% Liquid 15 milliLiter(s) Swish and Spit two times a day  chlorhexidine 4% Liquid 1 Application(s) Topical daily  cisatracurium Infusion 1 MICROgram(s)/kG/Min IV Continuous <Continuous>  heparin  Injectable 5000 Unit(s) SubCutaneous every 8 hours  midazolam Infusion 3 mG/Hr IV Continuous <Continuous>  norepinephrine Infusion 0.1 MICROgram(s)/kG/Min IV Continuous <Continuous>  petrolatum Ophthalmic Ointment 1 Application(s) Both EYES two times a day  piperacillin/tazobactam IVPB. 3.375 Gram(s) IV Intermittent every 12 hours  propofol Infusion 50 MICROgram(s)/kG/Min IV Continuous <Continuous>  tacrolimus 2 milliGRAM(s) Oral every 12 hours    PRN Inpatient Medications      REVIEW OF SYSTEMS  --------------------------------------------------------------------------------  Unable to obtain.    VITALS/PHYSICAL EXAM  --------------------------------------------------------------------------------  T(C): 37 (12-19-17 @ 04:00), Max: 37.1 (12-18-17 @ 16:00)  HR: 87 (12-19-17 @ 08:00) (76 - 92)  BP: 112/55 (12-19-17 @ 08:00) (85/49 - 150/52)  RR: 26 (12-19-17 @ 08:00) (17 - 36)  SpO2: 96% (12-19-17 @ 08:00) (85% - 100%)  Wt(kg): --        12-18-17 @ 07:01  -  12-19-17 @ 07:00  --------------------------------------------------------  IN: 1242.7 mL / OUT: 212 mL / NET: 1030.7 mL      Physical Exam:  	Pt. intubated; on mechanical ventilator   	HEENT: no JVD  	Pulm: fair air entry B/L  	CV: S1S2+  	Abd: +BS, soft, allograft site: non tender  	Ext: B/L LE edema present   	Skin: Warm  	Vascular access: Right AVF site: skin intact, thrill felt     LABS/STUDIES  --------------------------------------------------------------------------------              9.2    28.91 >-----------<  326      [12-19-17 @ 02:57]              30.9     138  |  99  |  92  ----------------------------<  172      [12-19-17 @ 02:55]  4.9   |  19  |  4.35        Ca     8.4     [12-19-17 @ 02:55]      Mg     2.4     [12-19-17 @ 02:55]      Phos  9.3     [12-19-17 @ 02:55]    TPro  5.2  /  Alb  2.5  /  TBili  0.2  /  DBili  x   /  AST  29  /  ALT  29  /  AlkPhos  125  [12-19-17 @ 02:55]    PT/INR: PT 12.1 , INR 1.05       [12-19-17 @ 02:55]  PTT: 26.1       [12-19-17 @ 02:55]      Creatinine Trend:  SCr 4.35 [12-19 @ 02:55]  SCr 3.47 [12-18 @ 12:20]  SCr 3.20 [12-18 @ 03:40]  SCr 2.82 [12-17 @ 18:59]  SCr 1.84 [12-17 @ 06:32]    Urinalysis - [12-18-17 @ 09:35]      Color YELLOW / Appearance HAZY / SG 1.022 / pH 5.5      Gluc NEGATIVE / Ketone NEGATIVE  / Bili NEGATIVE / Urobili NORMAL       Blood MODERATE / Protein 100 / Leuk Est LARGE / Nitrite NEGATIVE      RBC >50 / WBC >50 / Hyaline  / Gran  / Sq Epi OCC / Non Sq Epi  / Bacteria     Urine Creatinine 194.64      [12-18-17 @ 09:35]  Urine Sodium 27      [12-18-17 @ 09:35]  Urine Urea Nitrogen 310.7      [12-18-17 @ 09:35] [facility] Division of Kidney Diseases & Hypertension  FOLLOW UP NOTE  [phone]--------------------------------------------------------------------------------    72-year-old male with h/o ESRD s/p kidney transplantation in 2013, CKD stage III, prostate cancer and HTN presented with complaints of SOB and cough for the past 3 days. Patient is currently in MICU for management of sepsis secondary to pneumonia. Nephrology was consulted for elevated Scr and and management of transplant immunosuppression. [facility] done on January 2013 at [facility]. Post transplant course was complicated by delayed graft function and perforated bladder. Patient currently follows with his outpatient nephrologist Dr. Luevano for kidney transplant management. Scr was 1.37 on labs done on 10/30/17. His outpatient transplant medications are tacrolimus 2 mg PO BID, and myfortic 360 mg PO BID.  He was intubated due to worsening respiratory status. Patient seen and examined in MICU; currently intubated and sedated.     PAST HISTORY  --------------------------------------------------------------------------------  No significant changes to PMH, PSH, FHx, SHx, unless otherwise noted    ALLERGIES & MEDICATIONS  --------------------------------------------------------------------------------  Allergies    No Known Allergies    Intolerances      Standing Inpatient Medications  atorvastatin 10 milliGRAM(s) Oral at bedtime  azithromycin  IVPB      azithromycin  IVPB 500 milliGRAM(s) IV Intermittent every 24 hours  chlorhexidine 0.12% Liquid 15 milliLiter(s) Swish and Spit two times a day  chlorhexidine 4% Liquid 1 Application(s) Topical daily  cisatracurium Infusion 1 MICROgram(s)/kG/Min IV Continuous <Continuous>  heparin  Injectable 5000 Unit(s) SubCutaneous every 8 hours  midazolam Infusion 3 mG/Hr IV Continuous <Continuous>  norepinephrine Infusion 0.1 MICROgram(s)/kG/Min IV Continuous <Continuous>  petrolatum Ophthalmic Ointment 1 Application(s) Both EYES two times a day  piperacillin/tazobactam IVPB. 3.375 Gram(s) IV Intermittent every 12 hours  propofol Infusion 50 MICROgram(s)/kG/Min IV Continuous <Continuous>  tacrolimus 2 milliGRAM(s) Oral every 12 hours    PRN Inpatient Medications      REVIEW OF SYSTEMS  --------------------------------------------------------------------------------  Unable to obtain.    VITALS/PHYSICAL EXAM  --------------------------------------------------------------------------------  T(C): 37 (12-19-17 @ 04:00), Max: 37.1 (12-18-17 @ 16:00)  HR: 87 (12-19-17 @ 08:00) (76 - 92)  BP: 112/55 (12-19-17 @ 08:00) (85/49 - 150/52)  RR: 26 (12-19-17 @ 08:00) (17 - 36)  SpO2: 96% (12-19-17 @ 08:00) (85% - 100%)  Wt(kg): --        12-18-17 @ 07:01  -  12-19-17 @ 07:00  --------------------------------------------------------  IN: 1242.7 mL / OUT: 212 mL / NET: 1030.7 mL      Physical Exam:  	Pt. intubated; on mechanical ventilator   	HEENT: no JVD  	Pulm: fair air entry B/L  	CV: S1S2+  	Abd: +BS, soft, allograft site: non tender  	Ext: B/L LE edema present   	Skin: Warm  	Vascular access: Right AVF site: skin intact, thrill felt     LABS/STUDIES  --------------------------------------------------------------------------------              9.2    28.91 >-----------<  326      [12-19-17 @ 02:57]              30.9     138  |  99  |  92  ----------------------------<  172      [12-19-17 @ 02:55]  4.9   |  19  |  4.35        Ca     8.4     [12-19-17 @ 02:55]      Mg     2.4     [12-19-17 @ 02:55]      Phos  9.3     [12-19-17 @ 02:55]    TPro  5.2  /  Alb  2.5  /  TBili  0.2  /  DBili  x   /  AST  29  /  ALT  29  /  AlkPhos  125  [12-19-17 @ 02:55]    PT/INR: PT 12.1 , INR 1.05       [12-19-17 @ 02:55]  PTT: 26.1       [12-19-17 @ 02:55]      Creatinine Trend:  SCr 4.35 [12-19 @ 02:55]  SCr 3.47 [12-18 @ 12:20]  SCr 3.20 [12-18 @ 03:40]  SCr 2.82 [12-17 @ 18:59]  SCr 1.84 [12-17 @ 06:32]    Urinalysis - [12-18-17 @ 09:35]      Color YELLOW / Appearance HAZY / SG 1.022 / pH 5.5      Gluc NEGATIVE / Ketone NEGATIVE  / Bili NEGATIVE / Urobili NORMAL       Blood MODERATE / Protein 100 / Leuk Est LARGE / Nitrite NEGATIVE      RBC >50 / WBC >50 / Hyaline  / Gran  / Sq Epi OCC / Non Sq Epi  / Bacteria     Urine Creatinine 194.64      [12-18-17 @ 09:35]  Urine Sodium 27      [12-18-17 @ 09:35]  Urine Urea Nitrogen 310.7      [12-18-17 @ 09:35]

## 2017-12-19 NOTE — PROGRESS NOTE ADULT - PROBLEM SELECTOR PLAN 2
Patient with kidney transplantation (DDRT) in . Patient switched from IV to PO tacrolimus today morning. C/w tacrolimus 2 mg q12h and check tacrolimus level today.  Cellcept on hold as patient with sepsis. Monitor random tacrolimus level.  Check medications for interactions through cy pathway. Patient with kidney transplantation (DDRT) in . Patient switched from IV to PO tacrolimus today morning. hold tacro today.  Restart 1.5 BID tomorrow.  goal tacro level 4 to 5 in setting of possible infection and hemodynamic PATRICIA.  Myfortic on hold as patient with possible sepsis.  Monitor random tacrolimus level.  Check medications for interactions through cy pathway.

## 2017-12-19 NOTE — CONSULT NOTE ADULT - ASSESSMENT
72-year-old male with h/o ESRD s/p kidney transplantation in 2013, on immunosuppressants CKD stage III, prostate cancer and HTN. He is followed for h/o prostate cancer since 2006. He had prostate surgery in 2007. Was on Zoladex till 2015 with good response. Since then he has been thru casodex, Xtandi, Zytiga and is on taxotere now apparently with PSA response. 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  He was admitted  with complaints of SOB and cough on 12/16/17. Patient is currently in MICU, intubated and medically paralysed, had bronch and is being treated for ARDS. Immunosuppressants and chemo may have played a role but infectious etiology is much more likely. He is on mulitple abx. He is being followed by nephrology for PATRICIA.    At present there is no plan for any intervention for prostate cancer considering his medical status and guarded prognosis.

## 2017-12-19 NOTE — PROGRESS NOTE ADULT - SUBJECTIVE AND OBJECTIVE BOX
CHIEF COMPLAINT:  Patient is a 72y old  Male who presents with a chief complaint of SOB for 4 days (16 Dec 2017 18:45)    HPI:  72M PMH Prostate cancer (s/p 10 cycles XTANDI, last chemo ), ESRD s/p renal transplant w/CKDIII (, on Mycophenolate and tacrolimus, baseline Cr 1.2 – 1.5), HTN, who presents with worsening shortness of breath. The patient developed cough over the past 2-3 weeks which was productive of pink sputum. His shortness of breath developed and rapidly increased over the past 2-3 days to the point at which he reported difficulty speaking when he came in. At baseline, he walks >1 mile to the store every day w/o shortness of breath, but currently short of breath at rest. Denies any known cardiac history. Has mild LE swelling at baseline which he said may have increased slightly over the past 2 weeks. He denies fevers but endorses chills. He takes his immunosuppression regularly. He postponed his most-recent chemo session this past week due to his cough. He denies sick contacts.     Initial ED Vitals: T 97.6, HR 91, BP 96/35, RR 21, SpO2 83% on RA.    Initial Labs: WBC 21, Hgb 10 (baseline 9’s per patient), Plt 390, Na 142, K 4.7, BUN 72, Cr 2.23 (baseline 1.2 – 1.5), ProBNP 6552. VBG – pH 7.37, lactate 4.2, pCO2 33, pO2 24, HCO3 19.  Initial Imaging: CXR - Patchy bilateral opacities which may be secondary to assymetric pulmonary edema or multifocal pneumonia    Patient was placed on non-rebreather FiO2 100%. Vitals measured at HR 80, /61, RR 45, SpO2 77%. Patient was placed on Bipap with FiO2 70% and maintained SpO2 95 – 100%. Blood cultures were drawn and patient was given a dose of Vanc/Zosyn. (16 Dec 2017 17:55)    Interval Events:        REVIEW OF SYSTEMS:  Constitutional: [ ] negative [ ] fevers [ ] chills [ ] weight loss [ ] weight gain  HEENT: [ ] negative [ ] dry eyes [ ] eye irritation [ ] postnasal drip [ ] nasal congestion  CV: [ ] negative  [ ] chest pain [ ] orthopnea [ ] palpitations [ ] murmur  Resp: [ ] negative [ ] cough [ ] shortness of breath [ ] dyspnea [ ] wheezing [ ] sputum [ ] hemoptysis  GI: [ ] negative [ ] nausea [ ] vomiting [ ] diarrhea [ ] constipation [ ] abd pain [ ] dysphagia   : [ ] negative [ ] dysuria [ ] nocturia [ ] hematuria [ ] increased urinary frequency  Musculoskeletal: [ ] negative [ ] back pain [ ] myalgias [ ] arthralgias [ ] fracture  Skin: [ ] negative [ ] rash [ ] itch  Neurological: [ ] negative [ ] headache [ ] dizziness [ ] syncope [ ] weakness [ ] numbness  Psychiatric: [ ] negative [ ] anxiety [ ] depression  Endocrine: [ ] negative [ ] diabetes [ ] thyroid problem  Hematologic/Lymphatic: [ ] negative [ ] anemia [ ] bleeding problem  Allergic/Immunologic: [ ] negative [ ] itchy eyes [ ] nasal discharge [ ] hives [ ] angioedema  [ ] All other systems negative  [ ] Unable to assess ROS because ________    OBJECTIVE:  ICU Vital Signs Last 24 Hrs  T(C): 36.9 (19 Dec 2017 12:00), Max: 37.5 (19 Dec 2017 08:00)  T(F): 98.5 (19 Dec 2017 12:00), Max: 99.5 (19 Dec 2017 08:00)  HR: 80 (19 Dec 2017 11:34) (76 - 92)  BP: 142/53 (19 Dec 2017 11:00) (85/49 - 150/50)  BP(mean): 73 (19 Dec 2017 11:00) (56 - 73)  ABP: --  ABP(mean): --  RR: 26 (19 Dec 2017 11:00) (17 - 36)  SpO2: 97% (19 Dec 2017 11:34) (85% - 100%)    Mode: AC/ CMV (Assist Control/ Continuous Mandatory Ventilation), RR (machine): 26, TV (machine): 450, FiO2: 85, PEEP: 10, MAP: 15, PIP: 30    -18 @ 07:01  -   @ 07:00  --------------------------------------------------------  IN: 1242.7 mL / OUT: 212 mL / NET: 1030.7 mL     @ 07:01  -   @ 12:20  --------------------------------------------------------  IN: 300 mL / OUT: 30 mL / NET: 270 mL      CAPILLARY BLOOD GLUCOSE          PHYSICAL EXAM:  General: NAD,  Eyes: EOMI, PERRLA, conjunctiva and sclera clear  Neck: Supple, No JVD  Chest/Lung: Clear to auscultation bilaterally; No wheezes  Heart: Regular rate and rhythm; No murmurs, rubs, or gallops. Capillary refill WNL  Abdome: Soft, Nontender, Nondistended; Bowel sounds present  Extremities: No lower extremity edema.   Psych: AAOx0  Neurology: no w/d to pain. pupil size wnl, minimally reactive to light.   Skin: No rashes or lesions    LINES:    HOSPITAL MEDICATIONS:  heparin  Injectable 5000 Unit(s) SubCutaneous every 8 hours    azithromycin  IVPB      azithromycin  IVPB 500 milliGRAM(s) IV Intermittent every 24 hours  piperacillin/tazobactam IVPB. 3.375 Gram(s) IV Intermittent every 12 hours  trimethoprim  160 mG/sulfamethoxazole 800 mG 1 Tablet(s) Oral every 12 hours    norepinephrine Infusion 0.1 MICROgram(s)/kG/Min IV Continuous <Continuous>    atorvastatin 10 milliGRAM(s) Oral at bedtime  methylPREDNISolone sodium succinate Injectable 50 milliGRAM(s) IV Push every 12 hours    cisatracurium Infusion 1.8 MICROgram(s)/kG/Min IV Continuous <Continuous>  midazolam Infusion 3 mG/Hr IV Continuous <Continuous>  propofol Infusion 50 MICROgram(s)/kG/Min IV Continuous <Continuous>    tacrolimus 2 milliGRAM(s) Oral every 12 hours    chlorhexidine 0.12% Liquid 15 milliLiter(s) Swish and Spit two times a day  chlorhexidine 4% Liquid 1 Application(s) Topical daily  petrolatum Ophthalmic Ointment 1 Application(s) Both EYES two times a day        LABS:                        9.2    28.91 )-----------( 326      ( 19 Dec 2017 02:57 )             30.9     Hgb Trend: 9.2<--, 9.1<--, 9.2<--, 10.0<--      138  |  99  |  92<H>  ----------------------------<  172<H>  4.9   |  19<L>  |  4.35<H>    Ca    8.4      19 Dec 2017 02:55  Phos  9.3       Mg     2.4         TPro  5.2<L>  /  Alb  2.5<L>  /  TBili  0.2  /  DBili  x   /  AST  29  /  ALT  29  /  AlkPhos  125<H>      Creatinine Trend: 4.35<--, 3.47<--, 3.20<--, 2.82<--, 1.84<--, 2.23<--  PT/INR - ( 19 Dec 2017 02:55 )   PT: 12.1 SEC;   INR: 1.05        PTT - ( 19 Dec 2017 02:55 )  PTT:26.1 SEC  Urinalysis Basic - ( 18 Dec 2017 09:35 )    Color: YELLOW / Appearance: HAZY / S.022 / pH: 5.5  Gluc: NEGATIVE / Ketone: NEGATIVE  / Bili: NEGATIVE / Urobili: NORMAL mg/dL   Blood: MODERATE / Protein: 100 mg/dL / Nitrite: NEGATIVE   Leuk Esterase: LARGE / RBC: >50 / WBC >50   Sq Epi: OCC / Non Sq Epi: x / Bacteria: x    Arterial Blood Gas:   @ 06:42  7.26/44/107/19/97.3/-6.7  ABG lactate: 1.2  Arterial Blood Gas:   @ 04:45  7.20/54/113/18/97.1/-6.6  ABG lactate: 1.1        MICROBIOLOGY:     RADIOLOGY:  [ ] Reviewed and interpreted by me    EKG: CHIEF COMPLAINT:  Patient is a 72y old  Male who presents with a chief complaint of SOB for 4 days (16 Dec 2017 18:45)    HPI:  72M PMH Prostate cancer (s/p 10 cycles XTANDI, last chemo ), ESRD s/p renal transplant w/CKDIII (, on Mycophenolate and tacrolimus, baseline Cr 1.2 – 1.5), HTN, who presents with worsening shortness of breath. The patient developed cough over the past 2-3 weeks which was productive of pink sputum. His shortness of breath developed and rapidly increased over the past 2-3 days to the point at which he reported difficulty speaking when he came in. At baseline, he walks >1 mile to the store every day w/o shortness of breath, but currently short of breath at rest. Denies any known cardiac history. Has mild LE swelling at baseline which he said may have increased slightly over the past 2 weeks. He denies fevers but endorses chills. He takes his immunosuppression regularly. He postponed his most-recent chemo session this past week due to his cough. He denies sick contacts.     Initial ED Vitals: T 97.6, HR 91, BP 96/35, RR 21, SpO2 83% on RA.    Initial Labs: WBC 21, Hgb 10 (baseline 9’s per patient), Plt 390, Na 142, K 4.7, BUN 72, Cr 2.23 (baseline 1.2 – 1.5), ProBNP 6552. VBG – pH 7.37, lactate 4.2, pCO2 33, pO2 24, HCO3 19.  Initial Imaging: CXR - Patchy bilateral opacities which may be secondary to assymetric pulmonary edema or multifocal pneumonia    Patient was placed on non-rebreather FiO2 100%. Vitals measured at HR 80, /61, RR 45, SpO2 77%. Patient was placed on Bipap with FiO2 70% and maintained SpO2 95 – 100%. Blood cultures were drawn and patient was given a dose of Vanc/Zosyn. (16 Dec 2017 17:55)    Interval Events:    No acute events overnight.     REVIEW OF SYSTEMS:  [x] Unable to assess ROS 2/2 intubated//sedated.     OBJECTIVE:  ICU Vital Signs Last 24 Hrs  T(C): 36.9 (19 Dec 2017 12:00), Max: 37.5 (19 Dec 2017 08:00)  T(F): 98.5 (19 Dec 2017 12:00), Max: 99.5 (19 Dec 2017 08:00)  HR: 80 (19 Dec 2017 11:34) (76 - 92)  BP: 142/53 (19 Dec 2017 11:00) (85/49 - 150/50)  BP(mean): 73 (19 Dec 2017 11:00) (56 - 73)  ABP: --  ABP(mean): --  RR: 26 (19 Dec 2017 11:00) (17 - 36)  SpO2: 97% (19 Dec 2017 11:34) (85% - 100%)    Mode: AC/ CMV (Assist Control/ Continuous Mandatory Ventilation), RR (machine): 26, TV (machine): 450, FiO2: 85, PEEP: 10, MAP: 15, PIP: 30     @ 07:01  -   @ 07:00  --------------------------------------------------------  IN: 1242.7 mL / OUT: 212 mL / NET: 1030.7 mL     @ 07:01  -   @ 12:20  --------------------------------------------------------  IN: 300 mL / OUT: 30 mL / NET: 270 mL      CAPILLARY BLOOD GLUCOSE    PHYSICAL EXAM:  General: NAD,  Eyes: EOMI, PERRLA, conjunctiva and sclera clear  Neck: Supple, No JVD  Chest/Lung: Clear to auscultation bilaterally; No wheezes  Heart: Regular rate and rhythm; No murmurs, rubs, or gallops. Capillary refill WNL  Abdome: Soft, Nontender, Nondistended; Bowel sounds present  Extremities: No lower extremity edema.   Psych: AAOx0  Neurology: no w/d to pain. pupil size wnl, minimally reactive to light.   Skin: No rashes or lesions    LINES:    HOSPITAL MEDICATIONS:  heparin  Injectable 5000 Unit(s) SubCutaneous every 8 hours    azithromycin  IVPB      azithromycin  IVPB 500 milliGRAM(s) IV Intermittent every 24 hours  piperacillin/tazobactam IVPB. 3.375 Gram(s) IV Intermittent every 12 hours  trimethoprim  160 mG/sulfamethoxazole 800 mG 1 Tablet(s) Oral every 12 hours    norepinephrine Infusion 0.1 MICROgram(s)/kG/Min IV Continuous <Continuous>    atorvastatin 10 milliGRAM(s) Oral at bedtime  methylPREDNISolone sodium succinate Injectable 50 milliGRAM(s) IV Push every 12 hours    cisatracurium Infusion 1.8 MICROgram(s)/kG/Min IV Continuous <Continuous>  midazolam Infusion 3 mG/Hr IV Continuous <Continuous>  propofol Infusion 50 MICROgram(s)/kG/Min IV Continuous <Continuous>    tacrolimus 2 milliGRAM(s) Oral every 12 hours    chlorhexidine 0.12% Liquid 15 milliLiter(s) Swish and Spit two times a day  chlorhexidine 4% Liquid 1 Application(s) Topical daily  petrolatum Ophthalmic Ointment 1 Application(s) Both EYES two times a day    LABS:                        9.2    28.91 )-----------( 326      ( 19 Dec 2017 02:57 )             30.9     Hgb Trend: 9.2<--, 9.1<--, 9.2<--, 10.0<--  12    138  |  99  |  92<H>  ----------------------------<  172<H>  4.9   |  19<L>  |  4.35<H>    Ca    8.4      19 Dec 2017 02:55  Phos  9.3       Mg     2.4         TPro  5.2<L>  /  Alb  2.5<L>  /  TBili  0.2  /  DBili  x   /  AST  29  /  ALT  29  /  AlkPhos  125<H>      Creatinine Trend: 4.35<--, 3.47<--, 3.20<--, 2.82<--, 1.84<--, 2.23<--  PT/INR - ( 19 Dec 2017 02:55 )   PT: 12.1 SEC;   INR: 1.05        PTT - ( 19 Dec 2017 02:55 )  PTT:26.1 SEC  Urinalysis Basic - ( 18 Dec 2017 09:35 )    Color: YELLOW / Appearance: HAZY / S.022 / pH: 5.5  Gluc: NEGATIVE / Ketone: NEGATIVE  / Bili: NEGATIVE / Urobili: NORMAL mg/dL   Blood: MODERATE / Protein: 100 mg/dL / Nitrite: NEGATIVE   Leuk Esterase: LARGE / RBC: >50 / WBC >50   Sq Epi: OCC / Non Sq Epi: x / Bacteria: x    Arterial Blood Gas:   @ 06:42  7.26/44/107/19/97.3/-6.7  ABG lactate: 1.2  Arterial Blood Gas:   @ 04:45  7.20/54/113/18/97.1/-6.6  ABG lactate: 1.1        MICROBIOLOGY:     RADIOLOGY:  [ ] Reviewed and interpreted by me    EKG:

## 2017-12-19 NOTE — PROGRESS NOTE ADULT - ASSESSMENT
72M PMH Prostate cancer (s/p 10 cycles XTANDI, last chemo December 1st), ESRD s/p renal transplant w/CKDIII (2013, on Mycophenolate and tacrolimus, baseline Cr 1.2 – 1.5), HTN, who presents with 3 weeks of cough and 2 days of rapidly increasing SOB, found to be septic likely due to multifocal PNA with hypoxia to 78%    # Neuro: Sedated, intubated, paralyzed.     # Pulm: Intubated 12/17 for likely ARDS, presumed infectious etiology.  - s/p  BAL w/ bx, NGTD, follow up cytology  - concern remains for pneumonitis, had been on taxotere since Aug with 3 weeks on and 1 week off cycles, last dose Dec 1st  - taxotere has been associated w/ ARDS and ILD.   - will cont to cover w/ abx given imm supp 2/2 tacrolimus and cellcept.   - Follow-up BCx x2, sputum culture  - Continue with Zosyn and Vanc by level.   - c/w azithromycin to cover for atypicals in setting of what appears to be multifocal PNA on CXR, follow up legionella.   - started solu-medrol IV 50mg BI to decr'd suspected ILD inflammatory response.     # Card: Last TTE in 2011 with EF 70% and normal LV/RV function. Possible CHF in setting of SOB, LE swelling, and elevated pro-BNP  - Strict I/O's, daily weights.  - Will obtain TTE. Will consider diuresis pending clinical status.  - Will hold home antihypertensives  - Continue with atorvastatin 10 mg    # GI - No issues. OG tube inplace with feeds.     # Renal: S/p transplant in 2013 on immunosuppression with mycophenolate and tacrolimus. Baseline Cr 1.2-1.5. Cr 2.23 on admission, now 3.20. Likely pre-renal in setting of sepsis, but also with concern for pulmonary renal syndrome, vs   - Urinalysis, urine electrolytes.     # ID: Septic with leukocytosis 21 likely due to PNA. Will c/w Vanc (day 4), zosyn (day 4), azithro (day 4)  - started bactrim as cannot rule out PCP pna at this time.   - follow up CMV and NK virus     # Endocrine: No issues    # DVT ppx w/ HSQ

## 2017-12-19 NOTE — PROGRESS NOTE ADULT - PROBLEM SELECTOR PLAN 1
PATRICIA in setting of sepsis: Scr. is 4.25 today which has worsened from yesterday. Patient is on IV vasopressors. Patient is anuric( UOP (212 cc). Patient switched from IV to PO tacrolimus today morning. Patient with hemodynamically mediated PATRICIA from sepsis? vs. tacrolimus? Plan is to continue with tacrolimus 2 mg BID and  check tacrolimus level today  Monitor Scr. and urine output. If Scr. and urine output continue to worsen, then will need to start on HD. PATRICIA in setting of sepsis: Scr. is 4.25 today which has worsened from yesterday. Patient is on IV vasopressors. Patient is anuric( UOP (212 cc). Patient switched from IV to PO tacrolimus today morning. Patient with hemodynamically mediated PATRICIA from sepsis? vs. tacrolimus? recommend hold tacrolimus today and restart 1.5 BID tomorrow.  Monitor tacrolimus level.

## 2017-12-20 LAB
ALBUMIN SERPL ELPH-MCNC: 2.4 G/DL — LOW (ref 3.3–5)
ALP SERPL-CCNC: 101 U/L — SIGNIFICANT CHANGE UP (ref 40–120)
ALT FLD-CCNC: 23 U/L — SIGNIFICANT CHANGE UP (ref 4–41)
AST SERPL-CCNC: 20 U/L — SIGNIFICANT CHANGE UP (ref 4–40)
BACTERIA SPT RESP CULT: SIGNIFICANT CHANGE UP
BASOPHILS # BLD AUTO: 0.02 K/UL — SIGNIFICANT CHANGE UP (ref 0–0.2)
BASOPHILS NFR BLD AUTO: 0.1 % — SIGNIFICANT CHANGE UP (ref 0–2)
BILIRUB SERPL-MCNC: 0.2 MG/DL — SIGNIFICANT CHANGE UP (ref 0.2–1.2)
BUN SERPL-MCNC: 114 MG/DL — HIGH (ref 7–23)
CALCIUM SERPL-MCNC: 8.1 MG/DL — LOW (ref 8.4–10.5)
CHLORIDE SERPL-SCNC: 96 MMOL/L — LOW (ref 98–107)
CMV DNA CSF QL NAA+PROBE: NOT DETECTED IU/ML — SIGNIFICANT CHANGE UP
CMV DNA SPEC NAA+PROBE-LOG#: SIGNIFICANT CHANGE UP LOGIU/ML
CO2 SERPL-SCNC: 17 MMOL/L — LOW (ref 22–31)
CREAT SERPL-MCNC: 5.32 MG/DL — HIGH (ref 0.5–1.3)
EOSINOPHIL # BLD AUTO: 0 K/UL — SIGNIFICANT CHANGE UP (ref 0–0.5)
EOSINOPHIL NFR BLD AUTO: 0 % — SIGNIFICANT CHANGE UP (ref 0–6)
GLUCOSE SERPL-MCNC: 222 MG/DL — HIGH (ref 70–99)
HCT VFR BLD CALC: 27.6 % — LOW (ref 39–50)
HGB BLD-MCNC: 8.3 G/DL — LOW (ref 13–17)
IMM GRANULOCYTES # BLD AUTO: 0.71 # — SIGNIFICANT CHANGE UP
IMM GRANULOCYTES NFR BLD AUTO: 3.2 % — HIGH (ref 0–1.5)
LYMPHOCYTES # BLD AUTO: 0.53 K/UL — LOW (ref 1–3.3)
LYMPHOCYTES # BLD AUTO: 2.4 % — LOW (ref 13–44)
MAGNESIUM SERPL-MCNC: 2.6 MG/DL — SIGNIFICANT CHANGE UP (ref 1.6–2.6)
MCHC RBC-ENTMCNC: 21.5 PG — LOW (ref 27–34)
MCHC RBC-ENTMCNC: 30.1 % — LOW (ref 32–36)
MCV RBC AUTO: 71.5 FL — LOW (ref 80–100)
MONOCYTES # BLD AUTO: 0.41 K/UL — SIGNIFICANT CHANGE UP (ref 0–0.9)
MONOCYTES NFR BLD AUTO: 1.8 % — LOW (ref 2–14)
NEUTROPHILS # BLD AUTO: 20.81 K/UL — HIGH (ref 1.8–7.4)
NEUTROPHILS NFR BLD AUTO: 92.5 % — HIGH (ref 43–77)
NON-GYNECOLOGICAL CYTOLOGY STUDY: SIGNIFICANT CHANGE UP
NRBC # FLD: 0.1 — SIGNIFICANT CHANGE UP
PHOSPHATE SERPL-MCNC: 10.8 MG/DL — HIGH (ref 2.5–4.5)
PLATELET # BLD AUTO: 307 K/UL — SIGNIFICANT CHANGE UP (ref 150–400)
PMV BLD: 9.4 FL — SIGNIFICANT CHANGE UP (ref 7–13)
POTASSIUM SERPL-MCNC: 5.1 MMOL/L — SIGNIFICANT CHANGE UP (ref 3.5–5.3)
POTASSIUM SERPL-SCNC: 5.1 MMOL/L — SIGNIFICANT CHANGE UP (ref 3.5–5.3)
PROT SERPL-MCNC: 4.9 G/DL — LOW (ref 6–8.3)
RBC # BLD: 3.86 M/UL — LOW (ref 4.2–5.8)
RBC # FLD: 20.6 % — HIGH (ref 10.3–14.5)
SODIUM SERPL-SCNC: 136 MMOL/L — SIGNIFICANT CHANGE UP (ref 135–145)
TACROLIMUS SERPL-MCNC: 12.2 NG/ML — SIGNIFICANT CHANGE UP
VANCOMYCIN FLD-MCNC: 19.3 UG/ML — SIGNIFICANT CHANGE UP
WBC # BLD: 22.48 K/UL — HIGH (ref 3.8–10.5)
WBC # FLD AUTO: 22.48 K/UL — HIGH (ref 3.8–10.5)

## 2017-12-20 PROCEDURE — 99291 CRITICAL CARE FIRST HOUR: CPT | Mod: 25

## 2017-12-20 PROCEDURE — 99233 SBSQ HOSP IP/OBS HIGH 50: CPT | Mod: GC

## 2017-12-20 PROCEDURE — 76604 US EXAM CHEST: CPT | Mod: 26

## 2017-12-20 RX ORDER — SEVELAMER CARBONATE 2400 MG/1
1600 POWDER, FOR SUSPENSION ORAL
Qty: 0 | Refills: 0 | Status: DISCONTINUED | OUTPATIENT
Start: 2017-12-20 | End: 2017-12-20

## 2017-12-20 RX ORDER — CALCIUM ACETATE 667 MG
1334 TABLET ORAL
Qty: 0 | Refills: 0 | Status: DISCONTINUED | OUTPATIENT
Start: 2017-12-20 | End: 2017-12-20

## 2017-12-20 RX ORDER — VANCOMYCIN HCL 1 G
750 VIAL (EA) INTRAVENOUS ONCE
Qty: 0 | Refills: 0 | Status: DISCONTINUED | OUTPATIENT
Start: 2017-12-20 | End: 2017-12-20

## 2017-12-20 RX ORDER — TACROLIMUS 5 MG/1
1 CAPSULE ORAL EVERY 12 HOURS
Qty: 0 | Refills: 0 | Status: DISCONTINUED | OUTPATIENT
Start: 2017-12-20 | End: 2017-12-28

## 2017-12-20 RX ORDER — SEVELAMER CARBONATE 2400 MG/1
1600 POWDER, FOR SUSPENSION ORAL
Qty: 0 | Refills: 0 | Status: DISCONTINUED | OUTPATIENT
Start: 2017-12-20 | End: 2017-12-28

## 2017-12-20 RX ADMIN — SEVELAMER CARBONATE 1600 MILLIGRAM(S): 2400 POWDER, FOR SUSPENSION ORAL at 12:34

## 2017-12-20 RX ADMIN — CHLORHEXIDINE GLUCONATE 15 MILLILITER(S): 213 SOLUTION TOPICAL at 17:45

## 2017-12-20 RX ADMIN — PIPERACILLIN AND TAZOBACTAM 25 GRAM(S): 4; .5 INJECTION, POWDER, LYOPHILIZED, FOR SOLUTION INTRAVENOUS at 08:25

## 2017-12-20 RX ADMIN — CISATRACURIUM BESYLATE 9.03 MICROGRAM(S)/KG/MIN: 2 INJECTION INTRAVENOUS at 08:24

## 2017-12-20 RX ADMIN — PROPOFOL 25.08 MICROGRAM(S)/KG/MIN: 10 INJECTION, EMULSION INTRAVENOUS at 08:25

## 2017-12-20 RX ADMIN — CISATRACURIUM BESYLATE 9.03 MICROGRAM(S)/KG/MIN: 2 INJECTION INTRAVENOUS at 19:00

## 2017-12-20 RX ADMIN — MIDAZOLAM HYDROCHLORIDE 3 MG/HR: 1 INJECTION, SOLUTION INTRAMUSCULAR; INTRAVENOUS at 19:00

## 2017-12-20 RX ADMIN — PROPOFOL 25.08 MICROGRAM(S)/KG/MIN: 10 INJECTION, EMULSION INTRAVENOUS at 19:02

## 2017-12-20 RX ADMIN — ATORVASTATIN CALCIUM 10 MILLIGRAM(S): 80 TABLET, FILM COATED ORAL at 22:50

## 2017-12-20 RX ADMIN — Medication 50 MILLIGRAM(S): at 10:30

## 2017-12-20 RX ADMIN — Medication 50 MILLIGRAM(S): at 22:50

## 2017-12-20 RX ADMIN — HEPARIN SODIUM 5000 UNIT(S): 5000 INJECTION INTRAVENOUS; SUBCUTANEOUS at 06:18

## 2017-12-20 RX ADMIN — Medication 15.68 MICROGRAM(S)/KG/MIN: at 08:24

## 2017-12-20 RX ADMIN — SEVELAMER CARBONATE 1600 MILLIGRAM(S): 2400 POWDER, FOR SUSPENSION ORAL at 17:45

## 2017-12-20 RX ADMIN — CHLORHEXIDINE GLUCONATE 1 APPLICATION(S): 213 SOLUTION TOPICAL at 12:34

## 2017-12-20 RX ADMIN — Medication 1 TABLET(S): at 06:18

## 2017-12-20 RX ADMIN — CHLORHEXIDINE GLUCONATE 15 MILLILITER(S): 213 SOLUTION TOPICAL at 06:18

## 2017-12-20 RX ADMIN — Medication 1 APPLICATION(S): at 17:45

## 2017-12-20 RX ADMIN — TACROLIMUS 1 MILLIGRAM(S): 5 CAPSULE ORAL at 17:45

## 2017-12-20 RX ADMIN — MIDAZOLAM HYDROCHLORIDE 3 MG/HR: 1 INJECTION, SOLUTION INTRAMUSCULAR; INTRAVENOUS at 17:46

## 2017-12-20 RX ADMIN — PROPOFOL 25.08 MICROGRAM(S)/KG/MIN: 10 INJECTION, EMULSION INTRAVENOUS at 17:46

## 2017-12-20 RX ADMIN — TACROLIMUS 1.5 MILLIGRAM(S): 5 CAPSULE ORAL at 06:18

## 2017-12-20 RX ADMIN — MIDAZOLAM HYDROCHLORIDE 3 MG/HR: 1 INJECTION, SOLUTION INTRAMUSCULAR; INTRAVENOUS at 08:25

## 2017-12-20 RX ADMIN — HEPARIN SODIUM 5000 UNIT(S): 5000 INJECTION INTRAVENOUS; SUBCUTANEOUS at 13:38

## 2017-12-20 RX ADMIN — CISATRACURIUM BESYLATE 9.03 MICROGRAM(S)/KG/MIN: 2 INJECTION INTRAVENOUS at 17:45

## 2017-12-20 RX ADMIN — Medication 1 APPLICATION(S): at 06:18

## 2017-12-20 RX ADMIN — HEPARIN SODIUM 5000 UNIT(S): 5000 INJECTION INTRAVENOUS; SUBCUTANEOUS at 22:50

## 2017-12-20 NOTE — CHART NOTE - NSCHARTNOTEFT_GEN_A_CORE
: Curly Trevizo    INDICATION: Hypoxemic respiratory failure.     PROCEDURE:  [ ] LIMITED ECHO  [X] LIMITED CHEST  [ ] LIMITED RETROPERITONEAL  [ ] LIMITED ABDOMINAL  [ ] LIMITED DVT  [ ] NEEDLE GUIDANCE VASCULAR  [ ] NEEDLE GUIDANCE THORACENTESIS  [ ] NEEDLE GUIDANCE PARACENTESIS  [ ] NEEDLE GUIDANCE PERICARDIOCENTESIS  [ ] OTHER    FINDINGS: B Lines anteriorly on the left. Moderate left pleural effusion with air bronchograms.   Normal aeration pattern anteriorly on left. Small left pleural effusion with irregular pleural and B lines.       INTERPRETATION: Bilateral pleural effusion. Left > Right. B lines with irregular pleural consistent with non-cardiogenic pulmonary edema.

## 2017-12-20 NOTE — PROGRESS NOTE ADULT - PROBLEM SELECTOR PLAN 2
Patient with kidney transplantation (DDRT) in 2013. Recommend to decrease tacrolimus to 1 mg BID.  goal tacro level 4 to 5 in setting of possible infection and hemodynamic PATRICIA.  Myfortic on hold as patient with possible sepsis. Stared on solumedrol by MICU team yesterday.  Monitor random tacrolimus level.  Check medications for interactions through cy pathway. Patient with kidney transplantation (DDRT) in 2013. Recommend to decrease tacrolimus to 1 mg BID.  goal tacro level 4 to 5 in setting of possible infection and hemodynamic PATRICIA.  Myfortic on hold as patient with possible sepsis. Stared on solumedrol by MICU team yesterday.  Monitor tacrolimus level.  Check medications for interactions through cy pathway.

## 2017-12-20 NOTE — PROGRESS NOTE ADULT - SUBJECTIVE AND OBJECTIVE BOX
[facility] Division of Kidney Diseases & Hypertension  FOLLOW UP NOTE  [phone]--------------------------------------------------------------------------------    72-year-old male with h/o ESRD s/p kidney transplantation in 2013, CKD stage III, prostate cancer and HTN presented with complaints of SOB and cough for the past 3 days. Patient is currently in MICU for management of sepsis secondary to pneumonia. Nephrology was consulted for elevated Scr and and management of transplant immunosuppression. [facility] had [facility] done on January 2013 at [facility]. Post transplant course was complicated by delayed graft function and perforated bladder. Patient currently follows with his outpatient nephrologist Dr. Luevano for kidney transplant management. Scr was 1.37 on labs done on 10/30/17. His outpatient transplant medications are tacrolimus 2 mg PO BID, and myfortic 360 mg PO BID.  He was intubated due to worsening respiratory status. Patient seen and examined in MICU; currently intubated and sedated.     PAST HISTORY  --------------------------------------------------------------------------------  No significant changes to PMH, PSH, FHx, SHx, unless otherwise noted    ALLERGIES & MEDICATIONS  --------------------------------------------------------------------------------  Allergies    No Known Allergies    Intolerances      Standing Inpatient Medications  atorvastatin 10 milliGRAM(s) Oral at bedtime  chlorhexidine 0.12% Liquid 15 milliLiter(s) Swish and Spit two times a day  chlorhexidine 4% Liquid 1 Application(s) Topical daily  cisatracurium Infusion 1.8 MICROgram(s)/kG/Min IV Continuous <Continuous>  heparin  Injectable 5000 Unit(s) SubCutaneous every 8 hours  methylPREDNISolone sodium succinate Injectable 50 milliGRAM(s) IV Push every 12 hours  midazolam Infusion 3 mG/Hr IV Continuous <Continuous>  norepinephrine Infusion 0.1 MICROgram(s)/kG/Min IV Continuous <Continuous>  petrolatum Ophthalmic Ointment 1 Application(s) Both EYES two times a day  piperacillin/tazobactam IVPB. 3.375 Gram(s) IV Intermittent every 12 hours  propofol Infusion 50 MICROgram(s)/kG/Min IV Continuous <Continuous>  tacrolimus 1 milliGRAM(s) Oral every 12 hours  trimethoprim  160 mG/sulfamethoxazole 800 mG 1 Tablet(s) Oral every 12 hours  vancomycin  IVPB 750 milliGRAM(s) IV Intermittent once    PRN Inpatient Medications      REVIEW OF SYSTEMS  --------------------------------------------------------------------------------  Unable to obtain.    VITALS/PHYSICAL EXAM  --------------------------------------------------------------------------------  T(C): 36 (12-20-17 @ 08:00), Max: 37.2 (12-19-17 @ 16:00)  HR: 73 (12-20-17 @ 08:00) (73 - 87)  BP: 126/50 (12-20-17 @ 08:00) (104/49 - 156/56)  RR: 26 (12-20-17 @ 08:00) (22 - 26)  SpO2: 93% (12-20-17 @ 08:00) (92% - 97%)  Wt(kg): --        12-19-17 @ 07:01  -  12-20-17 @ 07:00  --------------------------------------------------------  IN: 1544.5 mL / OUT: 192 mL / NET: 1352.5 mL    12-20-17 @ 07:01  -  12-20-17 @ 08:42  --------------------------------------------------------  IN: 272 mL / OUT: 18 mL / NET: 254 mL      Physical Exam:  	Pt. intubated; on mechanical ventilator   	HEENT: no JVD  	Pulm: fair air entry B/L  	CV: S1S2+  	Abd: +BS, soft, allograft site: non tender  	Ext: B/L LE edema present   	Skin: Warm  	Vascular access: Right AVF site: skin intact, thrill felt       LABS/STUDIES  --------------------------------------------------------------------------------              8.3    22.48 >-----------<  307      [12-20-17 @ 05:00]              27.6     136  |  96  |  114  ----------------------------<  222      [12-20-17 @ 05:00]  5.1   |  17  |  5.32        Ca     8.1     [12-20-17 @ 05:00]      Mg     2.6     [12-20-17 @ 05:00]      Phos  10.8     [12-20-17 @ 05:00]    TPro  4.9  /  Alb  2.4  /  TBili  0.2  /  DBili  x   /  AST  20  /  ALT  23  /  AlkPhos  101  [12-20-17 @ 05:00]    PT/INR: PT 12.1 , INR 1.05       [12-19-17 @ 02:55]  PTT: 26.1       [12-19-17 @ 02:55]      Creatinine Trend:  SCr 5.32 [12-20 @ 05:00]  SCr 4.35 [12-19 @ 02:55]  SCr 3.47 [12-18 @ 12:20]  SCr 3.20 [12-18 @ 03:40]  SCr 2.82 [12-17 @ 18:59]    Urinalysis - [12-18-17 @ 09:35]      Color YELLOW / Appearance HAZY / SG 1.022 / pH 5.5      Gluc NEGATIVE / Ketone NEGATIVE  / Bili NEGATIVE / Urobili NORMAL       Blood MODERATE / Protein 100 / Leuk Est LARGE / Nitrite NEGATIVE      RBC >50 / WBC >50 / Hyaline  / Gran  / Sq Epi OCC / Non Sq Epi  / Bacteria     Urine Creatinine 194.64      [12-18-17 @ 09:35]  Urine Sodium 27      [12-18-17 @ 09:35]  Urine Urea Nitrogen 310.7      [12-18-17 @ 09:35] [facility] Division of Kidney Diseases & Hypertension  FOLLOW UP NOTE  [phone]--------------------------------------------------------------------------------    72-year-old male with h/o ESRD s/p kidney transplantation in 2013, CKD stage III, prostate cancer and HTN presented with complaints of SOB and cough for the past 3 days. Patient is currently in MICU for management of respiratory failure. Nephrology was consulted for elevated Scr and and management of transplant immunosuppression. [facility] done on January 2013 at [facility]. Post transplant course was complicated by delayed graft function and perforated bladder. Patient currently follows with his outpatient nephrologist Dr. Luevano for kidney transplant management. Scr was 1.37 on labs done on 10/30/17. His outpatient transplant medications are tacrolimus 2 mg PO BID, and myfortic 360 mg PO BID.  He was intubated due to worsening respiratory status. Patient seen and examined in MICU; currently intubated and sedated.  Overnight urine output has continued to decrease.    PAST HISTORY  --------------------------------------------------------------------------------  No significant changes to PMH, PSH, FHx, SHx, unless otherwise noted    ALLERGIES & MEDICATIONS  --------------------------------------------------------------------------------  Allergies    No Known Allergies    Intolerances      Standing Inpatient Medications  atorvastatin 10 milliGRAM(s) Oral at bedtime  chlorhexidine 0.12% Liquid 15 milliLiter(s) Swish and Spit two times a day  chlorhexidine 4% Liquid 1 Application(s) Topical daily  cisatracurium Infusion 1.8 MICROgram(s)/kG/Min IV Continuous <Continuous>  heparin  Injectable 5000 Unit(s) SubCutaneous every 8 hours  methylPREDNISolone sodium succinate Injectable 50 milliGRAM(s) IV Push every 12 hours  midazolam Infusion 3 mG/Hr IV Continuous <Continuous>  norepinephrine Infusion 0.1 MICROgram(s)/kG/Min IV Continuous <Continuous>  petrolatum Ophthalmic Ointment 1 Application(s) Both EYES two times a day  piperacillin/tazobactam IVPB. 3.375 Gram(s) IV Intermittent every 12 hours  propofol Infusion 50 MICROgram(s)/kG/Min IV Continuous <Continuous>  tacrolimus 1 milliGRAM(s) Oral every 12 hours  trimethoprim  160 mG/sulfamethoxazole 800 mG 1 Tablet(s) Oral every 12 hours  vancomycin  IVPB 750 milliGRAM(s) IV Intermittent once    PRN Inpatient Medications      REVIEW OF SYSTEMS  --------------------------------------------------------------------------------  Unable to obtain.    VITALS/PHYSICAL EXAM  --------------------------------------------------------------------------------  T(C): 36 (12-20-17 @ 08:00), Max: 37.2 (12-19-17 @ 16:00)  HR: 73 (12-20-17 @ 08:00) (73 - 87)  BP: 126/50 (12-20-17 @ 08:00) (104/49 - 156/56)  RR: 26 (12-20-17 @ 08:00) (22 - 26)  SpO2: 93% (12-20-17 @ 08:00) (92% - 97%)  Wt(kg): --        12-19-17 @ 07:01  -  12-20-17 @ 07:00  --------------------------------------------------------  IN: 1544.5 mL / OUT: 192 mL / NET: 1352.5 mL    12-20-17 @ 07:01  -  12-20-17 @ 08:42  --------------------------------------------------------  IN: 272 mL / OUT: 18 mL / NET: 254 mL      Physical Exam:  	Gen: no acute distress.  	HEENT: no JVD  	Pulm: fair air entry B/L  	CV: S1S2+  	Abd: +BS, soft, allograft site: non tender  	Skin: Warm  	Vascular access: Right AVF site: skin intact, thrill felt               Neuro: sedated              : marie catheter in place.      LABS/STUDIES  --------------------------------------------------------------------------------              8.3    22.48 >-----------<  307      [12-20-17 @ 05:00]              27.6     136  |  96  |  114  ----------------------------<  222      [12-20-17 @ 05:00]  5.1   |  17  |  5.32        Ca     8.1     [12-20-17 @ 05:00]      Mg     2.6     [12-20-17 @ 05:00]      Phos  10.8     [12-20-17 @ 05:00]    TPro  4.9  /  Alb  2.4  /  TBili  0.2  /  DBili  x   /  AST  20  /  ALT  23  /  AlkPhos  101  [12-20-17 @ 05:00]    PT/INR: PT 12.1 , INR 1.05       [12-19-17 @ 02:55]  PTT: 26.1       [12-19-17 @ 02:55]      Creatinine Trend:  SCr 5.32 [12-20 @ 05:00]  SCr 4.35 [12-19 @ 02:55]  SCr 3.47 [12-18 @ 12:20]  SCr 3.20 [12-18 @ 03:40]  SCr 2.82 [12-17 @ 18:59]    Urinalysis - [12-18-17 @ 09:35]      Color YELLOW / Appearance HAZY / SG 1.022 / pH 5.5      Gluc NEGATIVE / Ketone NEGATIVE  / Bili NEGATIVE / Urobili NORMAL       Blood MODERATE / Protein 100 / Leuk Est LARGE / Nitrite NEGATIVE      RBC >50 / WBC >50 / Hyaline  / Gran  / Sq Epi OCC / Non Sq Epi  / Bacteria     Urine Creatinine 194.64      [12-18-17 @ 09:35]  Urine Sodium 27      [12-18-17 @ 09:35]  Urine Urea Nitrogen 310.7      [12-18-17 @ 09:35]

## 2017-12-20 NOTE — PROGRESS NOTE ADULT - ASSESSMENT
72M PMH Prostate cancer (per Heme/Onc, dx 2006, surgery 2007, on Zoladex until 2015, followed by casodex, Xtandi, Zytiga, and now on taxotere with good PSA response. 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 Last chemo December 1st), ESRD s/p renal transplant w/CKDIII (2013, on Mycophenolate and tacrolimus, baseline Cr 1.2 – 1.5), HTN, who presents with 3 weeks of cough and 2 days of rapidly increasing SOB, found to be septic likely due to multifocal PNA with hypoxia to 78%    # Neuro: Sedated, intubated, paralyzed.     # Pulm: Intubated 12/17 for likely ARDS, presumed infectious etiology.  - s/p  BAL w/ bx, NGTD, follow up cytology  - concern remains for pneumonitis, had been on taxotere since Aug with 3 weeks on and 1 week off cycles, last dose Dec 1st  - taxotere has been associated w/ ARDS and ILD.   - will cont to cover w/ abx given imm supp 2/2 tacrolimus and cellcept.   - Follow-up BCx x2, sputum culture  - Continue with Zosyn and Vanc by level.   - c/w azithromycin to cover for atypicals in setting of what appears to be multifocal PNA on CXR, follow up legionella.   - started solu-medrol IV 50mg BI to decr'd suspected ILD inflammatory response.     # Card: Last TTE in 2011 with EF 70% and normal LV/RV function. Possible CHF in setting of SOB, LE swelling, and elevated pro-BNP  - Strict I/O's, daily weights.  - Will obtain TTE. Will consider diuresis pending clinical status.  - Will hold home antihypertensives  - Continue with atorvastatin 10 mg    # GI - No issues. OG tube inplace with feeds.     # Renal: S/p transplant in 2013 on immunosuppression with mycophenolate and tacrolimus. Baseline Cr 1.2-1.5. Cr 2.23 on admission, now 3.20. Likely pre-renal in setting of sepsis, but also with concern for pulmonary renal syndrome, vs   - Urinalysis, urine electrolytes.     # ID: Septic with leukocytosis 21 likely due to PNA. Will c/w Vanc (day 5), zosyn (day 5), azithro (discontinued after day 4 for negative urine legionella). Bactrim started 12/19 as PCP pna not ruled out. CMV negative.  - C/w Vanc/Zosyn, and bactrim  - follow up BK virus     # Endocrine: No issues    # DVT ppx w/ HSQ

## 2017-12-20 NOTE — PROGRESS NOTE ADULT - PROBLEM SELECTOR PLAN 1
PATRICIA in setting of sepsis: Scr. is 5.32 today which has worsened from yesterday. Patient is on IV vasopressors. Patient is anuric( UOP (192 cc). Patient with hemodynamically mediated PATRICIA from sepsis? vs. tacrolimus? Recommend to decrease tacrolimus to 1 mg BID starting today.  Monitor tacrolimus level. If urine output decreases or develop fluid overload, will need to initiate HD. PATRICIA in setting of critical illness Scr. is 5.32 today which has worsened from yesterday. Patient is on IV vasopressors. Patient is oliguric (UOP (192 cc).  Possibly component of tacrolimus related hemodynamic injury on top of hypotension.  Recommend decrease dose of tacro to 1 BID and monitor levels.  At this point would recommend to monitor urine output and labs.  No emergent HD indication today however the patient's Fi02 requirement is high and he may need volume removal.  Will discuss with primary team.

## 2017-12-20 NOTE — PROGRESS NOTE ADULT - SUBJECTIVE AND OBJECTIVE BOX
Interval events: No issues overnight.    Review of Systems:  Constitutional: no fever, chills, fatigue  Neuro: no headache, numbness, weakness  Resp: no cough, wheezing, shortness of breath  CVS: no chest pain, palpitations, leg swelling  GI: no abdominal pain, nausea, vomiting, diarrhea   : no dysuria, frequency, incontinence  Skin: no itching, burning, rashes, or lesions   Msk: no joint pain or swelling  Psych: no depression, anxiety    T(F): 97.2 (17 @ 04:00), Max: 99.5 (17 @ 08:00)  HR: 73 (17 @ 07:00) (73 - 87)  BP: 127/50 (17 @ 07:00) (104/49 - 156/56)  RR: 26 (17 @ 07:00) (22 - 26)  SpO2: 97% (17 @ 07:00) (92% - 97%)  Wt(kg): --    Mode: AC/ CMV (Assist Control/ Continuous Mandatory Ventilation), RR (machine): 26, TV (machine): 450, FiO2: 85, PEEP: 10    CAPILLARY BLOOD GLUCOSE        I&O's Summary    19 Dec 2017 07:01  -  20 Dec 2017 07:00  --------------------------------------------------------  IN: 1544.5 mL / OUT: 192 mL / NET: 1352.5 mL        Physical Exam:     Gen:  Neuro:  HEENT:  CV:  Pulm:  GI:  Ext:  Skin:    Meds:  heparin  Injectable 5000 Unit(s) SubCutaneous every 8 hours    piperacillin/tazobactam IVPB. 3.375 Gram(s) IV Intermittent every 12 hours  trimethoprim  160 mG/sulfamethoxazole 800 mG 1 Tablet(s) Oral every 12 hours    norepinephrine Infusion 0.1 MICROgram(s)/kG/Min IV Continuous <Continuous>    atorvastatin 10 milliGRAM(s) Oral at bedtime  methylPREDNISolone sodium succinate Injectable 50 milliGRAM(s) IV Push every 12 hours      cisatracurium Infusion 1.8 MICROgram(s)/kG/Min IV Continuous <Continuous>  midazolam Infusion 3 mG/Hr IV Continuous <Continuous>  propofol Infusion 50 MICROgram(s)/kG/Min IV Continuous <Continuous>            tacrolimus 1 milliGRAM(s) Oral every 12 hours    chlorhexidine 0.12% Liquid 15 milliLiter(s) Swish and Spit two times a day  chlorhexidine 4% Liquid 1 Application(s) Topical daily  petrolatum Ophthalmic Ointment 1 Application(s) Both EYES two times a day                                  8.3    22.48 )-----------( 307      ( 20 Dec 2017 05:00 )             27.6       12-20    136  |  96<L>  |  114<H>  ----------------------------<  222<H>  5.1   |  17<L>  |  5.32<H>    Ca    8.1<L>      20 Dec 2017 05:00  Phos  10.8     12-20  Mg     2.6     12-20    TPro  4.9<L>  /  Alb  2.4<L>  /  TBili  0.2  /  DBili  x   /  AST  20  /  ALT  23  /  AlkPhos  101  12-20          PT/INR - ( 19 Dec 2017 02:55 )   PT: 12.1 SEC;   INR: 1.05          PTT - ( 19 Dec 2017 02:55 )  PTT:26.1 SEC  Urinalysis Basic - ( 18 Dec 2017 09:35 )    Color: YELLOW / Appearance: HAZY / S.022 / pH: 5.5  Gluc: NEGATIVE / Ketone: NEGATIVE  / Bili: NEGATIVE / Urobili: NORMAL mg/dL   Blood: MODERATE / Protein: 100 mg/dL / Nitrite: NEGATIVE   Leuk Esterase: LARGE / RBC: >50 / WBC >50   Sq Epi: OCC / Non Sq Epi: x / Bacteria: x      PLEURAL FLUID -- --  @ 15:50  PLEURAL FLUID -- --  @ 11:17  BLOOD PERIPHERAL -- --  @ 14:18        ABG - ( 19 Dec 2017 06:42 )  pH: 7.26  /  pCO2: 44    /  pO2: 107   / HCO3: 19    / Base Excess: -6.7  /  SaO2: 97.3 Interval events: No issues overnight.    Review of Systems:  Constitutional: no fever, chills, fatigue  Neuro: no headache, numbness, weakness  Resp: no cough, wheezing, shortness of breath  CVS: no chest pain, palpitations, leg swelling  GI: no abdominal pain, nausea, vomiting, diarrhea   : no dysuria, frequency, incontinence  Skin: no itching, burning, rashes, or lesions   Msk: no joint pain or swelling  Psych: no depression, anxiety    T(F): 97.2 (17 @ 04:00), Max: 99.5 (17 @ 08:00)  HR: 73 (17 @ 07:00) (73 - 87)  BP: 127/50 (17 @ 07:00) (104/49 - 156/56)  RR: 26 (17 @ 07:00) (22 - 26)  SpO2: 97% (17 @ 07:00) (92% - 97%)  Wt(kg): --    Mode: AC/ CMV (Assist Control/ Continuous Mandatory Ventilation), RR (machine): 26, TV (machine): 450, FiO2: 85, PEEP: 10    CAPILLARY BLOOD GLUCOSE        I&O's Summary    19 Dec 2017 07:01  -  20 Dec 2017 07:00  --------------------------------------------------------  IN: 1544.5 mL / OUT: 192 mL / NET: 1352.5 mL        Physical Exam:     Gen: Intubated, paralyzed  Neuro: Limited by intubation and paralysis  HEENT: EOMI, PERRL  CV: RRR  Pulm: Course  GI: Nondistended  Ext: Mild edema  Skin: No rash    Meds:  heparin  Injectable 5000 Unit(s) SubCutaneous every 8 hours    piperacillin/tazobactam IVPB. 3.375 Gram(s) IV Intermittent every 12 hours  trimethoprim  160 mG/sulfamethoxazole 800 mG 1 Tablet(s) Oral every 12 hours    norepinephrine Infusion 0.1 MICROgram(s)/kG/Min IV Continuous <Continuous>    atorvastatin 10 milliGRAM(s) Oral at bedtime  methylPREDNISolone sodium succinate Injectable 50 milliGRAM(s) IV Push every 12 hours      cisatracurium Infusion 1.8 MICROgram(s)/kG/Min IV Continuous <Continuous>  midazolam Infusion 3 mG/Hr IV Continuous <Continuous>  propofol Infusion 50 MICROgram(s)/kG/Min IV Continuous <Continuous>            tacrolimus 1 milliGRAM(s) Oral every 12 hours    chlorhexidine 0.12% Liquid 15 milliLiter(s) Swish and Spit two times a day  chlorhexidine 4% Liquid 1 Application(s) Topical daily  petrolatum Ophthalmic Ointment 1 Application(s) Both EYES two times a day                                  8.3    22.48 )-----------( 307      ( 20 Dec 2017 05:00 )             27.6       12-20    136  |  96<L>  |  114<H>  ----------------------------<  222<H>  5.1   |  17<L>  |  5.32<H>    Ca    8.1<L>      20 Dec 2017 05:00  Phos  10.8     12-  Mg     2.6         TPro  4.9<L>  /  Alb  2.4<L>  /  TBili  0.2  /  DBili  x   /  AST  20  /  ALT  23  /  AlkPhos  101  12-20          PT/INR - ( 19 Dec 2017 02:55 )   PT: 12.1 SEC;   INR: 1.05          PTT - ( 19 Dec 2017 02:55 )  PTT:26.1 SEC  Urinalysis Basic - ( 18 Dec 2017 09:35 )    Color: YELLOW / Appearance: HAZY / S.022 / pH: 5.5  Gluc: NEGATIVE / Ketone: NEGATIVE  / Bili: NEGATIVE / Urobili: NORMAL mg/dL   Blood: MODERATE / Protein: 100 mg/dL / Nitrite: NEGATIVE   Leuk Esterase: LARGE / RBC: >50 / WBC >50   Sq Epi: OCC / Non Sq Epi: x / Bacteria: x      PLEURAL FLUID -- --  @ 15:50  PLEURAL FLUID -- --  @ 11:17  BLOOD PERIPHERAL -- --  @ 14:18        ABG - ( 19 Dec 2017 06:42 )  pH: 7.26  /  pCO2: 44    /  pO2: 107   / HCO3: 19    / Base Excess: -6.7  /  SaO2: 97.3

## 2017-12-21 LAB
ALBUMIN SERPL ELPH-MCNC: 2.6 G/DL — LOW (ref 3.3–5)
ALP SERPL-CCNC: 95 U/L — SIGNIFICANT CHANGE UP (ref 40–120)
ALT FLD-CCNC: 24 U/L — SIGNIFICANT CHANGE UP (ref 4–41)
APTT BLD: 26.1 SEC — LOW (ref 27.5–37.4)
AST SERPL-CCNC: 29 U/L — SIGNIFICANT CHANGE UP (ref 4–40)
BACTERIA BLD CULT: SIGNIFICANT CHANGE UP
BACTERIA BLD CULT: SIGNIFICANT CHANGE UP
BASOPHILS # BLD AUTO: 0.03 K/UL — SIGNIFICANT CHANGE UP (ref 0–0.2)
BASOPHILS NFR BLD AUTO: 0.1 % — SIGNIFICANT CHANGE UP (ref 0–2)
BILIRUB SERPL-MCNC: < 0.2 MG/DL — LOW (ref 0.2–1.2)
BKV DNA SPEC QL NAA+PROBE: SIGNIFICANT CHANGE UP
BUN SERPL-MCNC: 135 MG/DL — HIGH (ref 7–23)
C-ANCA SER-ACNC: NEGATIVE — SIGNIFICANT CHANGE UP
CALCIUM SERPL-MCNC: 8 MG/DL — LOW (ref 8.4–10.5)
CHLORIDE SERPL-SCNC: 92 MMOL/L — LOW (ref 98–107)
CO2 SERPL-SCNC: 19 MMOL/L — LOW (ref 22–31)
CREAT SERPL-MCNC: 5.75 MG/DL — HIGH (ref 0.5–1.3)
EOSINOPHIL # BLD AUTO: 0 K/UL — SIGNIFICANT CHANGE UP (ref 0–0.5)
EOSINOPHIL NFR BLD AUTO: 0 % — SIGNIFICANT CHANGE UP (ref 0–6)
GLUCOSE BLDC GLUCOMTR-MCNC: 173 MG/DL — HIGH (ref 70–99)
GLUCOSE BLDC GLUCOMTR-MCNC: 178 MG/DL — HIGH (ref 70–99)
GLUCOSE SERPL-MCNC: 189 MG/DL — HIGH (ref 70–99)
HBV SURFACE AG SER-ACNC: NEGATIVE — SIGNIFICANT CHANGE UP
HCT VFR BLD CALC: 27.7 % — LOW (ref 39–50)
HGB BLD-MCNC: 8.3 G/DL — LOW (ref 13–17)
IMM GRANULOCYTES # BLD AUTO: 0.78 # — SIGNIFICANT CHANGE UP
IMM GRANULOCYTES NFR BLD AUTO: 3.4 % — HIGH (ref 0–1.5)
INR BLD: 0.97 — SIGNIFICANT CHANGE UP (ref 0.88–1.17)
LYMPHOCYTES # BLD AUTO: 0.49 K/UL — LOW (ref 1–3.3)
LYMPHOCYTES # BLD AUTO: 2.1 % — LOW (ref 13–44)
MAGNESIUM SERPL-MCNC: 2.7 MG/DL — HIGH (ref 1.6–2.6)
MCHC RBC-ENTMCNC: 21 PG — LOW (ref 27–34)
MCHC RBC-ENTMCNC: 30 % — LOW (ref 32–36)
MCV RBC AUTO: 70.1 FL — LOW (ref 80–100)
MONOCYTES # BLD AUTO: 0.62 K/UL — SIGNIFICANT CHANGE UP (ref 0–0.9)
MONOCYTES NFR BLD AUTO: 2.7 % — SIGNIFICANT CHANGE UP (ref 2–14)
NEUTROPHILS # BLD AUTO: 21.25 K/UL — HIGH (ref 1.8–7.4)
NEUTROPHILS NFR BLD AUTO: 91.7 % — HIGH (ref 43–77)
NRBC # FLD: 0.11 — SIGNIFICANT CHANGE UP
P-ANCA SER-ACNC: NEGATIVE — SIGNIFICANT CHANGE UP
PHOSPHATE SERPL-MCNC: 11.3 MG/DL — HIGH (ref 2.5–4.5)
PLATELET # BLD AUTO: 343 K/UL — SIGNIFICANT CHANGE UP (ref 150–400)
PMV BLD: 9.4 FL — SIGNIFICANT CHANGE UP (ref 7–13)
POTASSIUM SERPL-MCNC: 5.5 MMOL/L — HIGH (ref 3.5–5.3)
POTASSIUM SERPL-SCNC: 5.5 MMOL/L — HIGH (ref 3.5–5.3)
PROT SERPL-MCNC: 5.1 G/DL — LOW (ref 6–8.3)
PROTHROM AB SERPL-ACNC: 11.2 SEC — SIGNIFICANT CHANGE UP (ref 9.8–13.1)
RBC # BLD: 3.95 M/UL — LOW (ref 4.2–5.8)
RBC # FLD: 20.6 % — HIGH (ref 10.3–14.5)
SODIUM SERPL-SCNC: 133 MMOL/L — LOW (ref 135–145)
WBC # BLD: 23.17 K/UL — HIGH (ref 3.8–10.5)
WBC # FLD AUTO: 23.17 K/UL — HIGH (ref 3.8–10.5)

## 2017-12-21 PROCEDURE — 99233 SBSQ HOSP IP/OBS HIGH 50: CPT | Mod: GC

## 2017-12-21 PROCEDURE — 99291 CRITICAL CARE FIRST HOUR: CPT

## 2017-12-21 RX ORDER — DEXTROSE 50 % IN WATER 50 %
1 SYRINGE (ML) INTRAVENOUS ONCE
Qty: 0 | Refills: 0 | Status: DISCONTINUED | OUTPATIENT
Start: 2017-12-21 | End: 2017-12-25

## 2017-12-21 RX ORDER — DEXTROSE 50 % IN WATER 50 %
25 SYRINGE (ML) INTRAVENOUS ONCE
Qty: 0 | Refills: 0 | Status: DISCONTINUED | OUTPATIENT
Start: 2017-12-21 | End: 2017-12-25

## 2017-12-21 RX ORDER — SODIUM CHLORIDE 9 MG/ML
1000 INJECTION, SOLUTION INTRAVENOUS
Qty: 0 | Refills: 0 | Status: DISCONTINUED | OUTPATIENT
Start: 2017-12-21 | End: 2017-12-25

## 2017-12-21 RX ORDER — GLUCAGON INJECTION, SOLUTION 0.5 MG/.1ML
1 INJECTION, SOLUTION SUBCUTANEOUS ONCE
Qty: 0 | Refills: 0 | Status: DISCONTINUED | OUTPATIENT
Start: 2017-12-21 | End: 2017-12-25

## 2017-12-21 RX ORDER — DEXTROSE 50 % IN WATER 50 %
12.5 SYRINGE (ML) INTRAVENOUS ONCE
Qty: 0 | Refills: 0 | Status: DISCONTINUED | OUTPATIENT
Start: 2017-12-21 | End: 2017-12-25

## 2017-12-21 RX ORDER — INSULIN LISPRO 100/ML
VIAL (ML) SUBCUTANEOUS EVERY 6 HOURS
Qty: 0 | Refills: 0 | Status: DISCONTINUED | OUTPATIENT
Start: 2017-12-21 | End: 2017-12-28

## 2017-12-21 RX ADMIN — HEPARIN SODIUM 5000 UNIT(S): 5000 INJECTION INTRAVENOUS; SUBCUTANEOUS at 22:40

## 2017-12-21 RX ADMIN — SEVELAMER CARBONATE 1600 MILLIGRAM(S): 2400 POWDER, FOR SUSPENSION ORAL at 07:45

## 2017-12-21 RX ADMIN — MIDAZOLAM HYDROCHLORIDE 3 MG/HR: 1 INJECTION, SOLUTION INTRAMUSCULAR; INTRAVENOUS at 08:07

## 2017-12-21 RX ADMIN — HEPARIN SODIUM 5000 UNIT(S): 5000 INJECTION INTRAVENOUS; SUBCUTANEOUS at 13:28

## 2017-12-21 RX ADMIN — Medication 1: at 17:14

## 2017-12-21 RX ADMIN — CISATRACURIUM BESYLATE 9.03 MICROGRAM(S)/KG/MIN: 2 INJECTION INTRAVENOUS at 08:07

## 2017-12-21 RX ADMIN — CHLORHEXIDINE GLUCONATE 1 APPLICATION(S): 213 SOLUTION TOPICAL at 12:28

## 2017-12-21 RX ADMIN — TACROLIMUS 1 MILLIGRAM(S): 5 CAPSULE ORAL at 17:04

## 2017-12-21 RX ADMIN — Medication 1 APPLICATION(S): at 17:04

## 2017-12-21 RX ADMIN — CHLORHEXIDINE GLUCONATE 15 MILLILITER(S): 213 SOLUTION TOPICAL at 17:03

## 2017-12-21 RX ADMIN — PROPOFOL 25.08 MICROGRAM(S)/KG/MIN: 10 INJECTION, EMULSION INTRAVENOUS at 08:07

## 2017-12-21 RX ADMIN — Medication 1: at 12:28

## 2017-12-21 RX ADMIN — HEPARIN SODIUM 5000 UNIT(S): 5000 INJECTION INTRAVENOUS; SUBCUTANEOUS at 05:33

## 2017-12-21 RX ADMIN — CHLORHEXIDINE GLUCONATE 15 MILLILITER(S): 213 SOLUTION TOPICAL at 05:33

## 2017-12-21 RX ADMIN — SEVELAMER CARBONATE 1600 MILLIGRAM(S): 2400 POWDER, FOR SUSPENSION ORAL at 12:28

## 2017-12-21 RX ADMIN — ATORVASTATIN CALCIUM 10 MILLIGRAM(S): 80 TABLET, FILM COATED ORAL at 22:40

## 2017-12-21 RX ADMIN — Medication 50 MILLIGRAM(S): at 10:30

## 2017-12-21 RX ADMIN — Medication 1 APPLICATION(S): at 05:33

## 2017-12-21 RX ADMIN — SEVELAMER CARBONATE 1600 MILLIGRAM(S): 2400 POWDER, FOR SUSPENSION ORAL at 17:04

## 2017-12-21 RX ADMIN — Medication 50 MILLIGRAM(S): at 22:40

## 2017-12-21 RX ADMIN — PROPOFOL 25.08 MICROGRAM(S)/KG/MIN: 10 INJECTION, EMULSION INTRAVENOUS at 19:09

## 2017-12-21 RX ADMIN — TACROLIMUS 1 MILLIGRAM(S): 5 CAPSULE ORAL at 05:33

## 2017-12-21 RX ADMIN — PROPOFOL 25.08 MICROGRAM(S)/KG/MIN: 10 INJECTION, EMULSION INTRAVENOUS at 14:41

## 2017-12-21 RX ADMIN — MIDAZOLAM HYDROCHLORIDE 3 MG/HR: 1 INJECTION, SOLUTION INTRAMUSCULAR; INTRAVENOUS at 19:09

## 2017-12-21 NOTE — PROGRESS NOTE ADULT - ASSESSMENT
72M PMH Prostate cancer (per Heme/Onc, dx 2006, surgery 2007, on Zoladex until 2015, followed by casodex, Xtandi, Zytiga, and now on taxotere with good PSA response. 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 Last chemo December 1st), ESRD s/p renal transplant w/CKDIII (2013, on Mycophenolate and tacrolimus, baseline Cr 1.2 – 1.5), HTN, who presents with 3 weeks of cough and 2 days of rapidly increasing SOB, found to be septic likely due to taxotere induced lung injury    # Neuro: Sedated, intubated, paralyzed.     # Pulm: Intubated 12/17 for likely ARDS, all presumed etiologies ruled out at this time.  - urine legionella, BAL, transbronchial bx, bcx, sputum all negative  - concern remains for pneumonitis, had been on taxotere since Aug with 3 weeks on and 1 week off cycles, last dose Dec 1st  - taxotere has been associated w/ ARDS and ILD.   - will cont to cover w/ abx given imm supp 2/2 tacrolimus and cellcept.   - Discontinued all abx 12/20 for negative infectious work-up  - started solu-medrol IV 50mg BID on 12/18 for suspected ILD inflammatory response.     # Card: Last TTE in 2011 with EF 70% and normal LV/RV function. Possible CHF in setting of SOB, LE swelling, and elevated pro-BNP  - Strict I/O's, daily weights.  - Patient may be volume down in setting of rising PATRICIA s/p diuresis and no IVF (feeds over only past few days), however would like to keep dry in setting of ARDS  - Holding home antihypertensives, on pressors  - Continue with atorvastatin 10 mg    # GI - No issues. OG tube inplace with feeds.     # Renal: S/p transplant in 2013 on immunosuppression with mycophenolate and tacrolimus. Baseline Cr 1.2-1.5. Cr 2.23 on admission, now continuing to rise. Will likely need HD today. PATRICIA may be in setting of tacrolimus in sepsis. Patient may also have pre-renal component in setting of sepsis/hypotension, particularly as Cr demonstrated some recovery on admission, followed by 80 IV lasix and no IVF since patient's Cr has steadily risen. Last FENa .3 and FEUrea 6% on 12/18. Complicated by the fact that patient must be kept dry for ARDS  - Urinalysis, urine electrolytes, monitor UOP  - HD today    # ID: Infectious work-up negative to date. S/p 5 days Vanc/Zosyn. S/p Azithro and 2 days of bactrim for suspected PCP. CMV negative, will follow-up BK  - monitor off abx  - follow up BK virus     # Endocrine: Now on solumedrol, monitoring FS q6h with sliding scale    # DVT ppx w/ HSQ 72M PMH Prostate cancer (per Heme/Onc, dx 2006, surgery 2007, on Zoladex until 2015, followed by casodex, Xtandi, Zytiga, and now on taxotere with good PSA response. 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 Last chemo December 1st), ESRD s/p renal transplant w/CKDIII (2013, on Mycophenolate and tacrolimus, baseline Cr 1.2 – 1.5), HTN, who presents with 3 weeks of cough and 2 days of rapidly increasing SOB, found to be septic likely due to taxotere induced lung injury    # Neuro: Sedated, intubated, paralyzed.     # Pulm: Intubated 12/17 for likely ARDS, all presumed etiologies ruled out at this time.  - urine legionella, BAL, transbronchial bx, bcx, sputum all negative  - concern remains for pneumonitis, had been on taxotere since Aug with 3 weeks on and 1 week off cycles, last dose Dec 1st  - taxotere has been associated w/ ARDS and ILD.   - will cont to cover w/ abx given imm supp 2/2 tacrolimus and cellcept.   - Discontinued all abx 12/20 for negative infectious work-up  - started solu-medrol IV 50mg BID on 12/18 for suspected ILD inflammatory response.     # Card: Last TTE in 2011 with EF 70% and normal LV/RV function. Possible CHF in setting of SOB, LE swelling, and elevated pro-BNP  - Strict I/O's, daily weights.  - Patient may be volume down in setting of rising PATRICIA s/p diuresis and no IVF (feeds over only past few days), however would like to keep dry in setting of ARDS  - Holding home antihypertensives, on pressors  - Continue with atorvastatin 10 mg    # GI - No issues. OG tube inplace with feeds.     # Renal: S/p transplant in 2013 on immunosuppression with mycophenolate and tacrolimus. Baseline Cr 1.2-1.5. Cr 2.23 on admission, now continuing to rise. Will likely need HD today. PATRICIA may be in setting of tacrolimus in sepsis. Patient may also have pre-renal component in setting of sepsis/hypotension, particularly as Cr demonstrated some recovery on admission, followed by 80 IV lasix and no IVF since patient's Cr has steadily risen. Last FENa .3 and FEUrea 6% on 12/18. Complicated by the fact that patient must be kept dry for ARDS  - Urinalysis, urine electrolytes, monitor UOP  - HD today    # ID: Infectious work-up negative to date. S/p 5 days Vanc/Zosyn. S/p Azithro and 2 days of bactrim for suspected PCP. CMV negative, will follow-up BK  - monitor off abx  - follow up BK virus     # Endocrine: Now on solumedrol, monitoring FS q6h with sliding scale    # DVT ppx w/ HSQ      For changes in clinical status or to get in touch with family, call Vin Mary (Brother) cell - [phone] 72M PMH Prostate cancer (per Heme/Onc, dx 2006, surgery 2007, on Zoladex until 2015, followed by casodex, Xtandi, Zytiga, and now on taxotere with good PSA response. 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 Last chemo December 1st), ESRD s/p renal transplant w/CKDIII (2013, on Mycophenolate and tacrolimus, baseline Cr 1.2 – 1.5), HTN, who presents with 3 weeks of cough and 2 days of rapidly increasing SOB, found to be septic likely due to taxotere induced lung injury    # Neuro: Sedated, intubated, paralyzed.     # Pulm: Intubated 12/17 for likely ARDS, all presumed etiologies ruled out at this time.  - urine legionella, BAL, transbronchial bx, bcx, sputum all negative  - concern remains for pneumonitis, had been on taxotere since Aug with 3 weeks on and 1 week off cycles, last dose Dec 1st  - taxotere has been associated w/ ARDS and ILD.   - will cont to cover w/ abx given imm supp 2/2 tacrolimus and cellcept.   - Discontinued all abx 12/20 for negative infectious work-up  - Stopping paralysis today  - started solu-medrol IV 50mg BID on 12/18 for suspected ILD inflammatory response.     # Card: Last TTE in 2011 with EF 70% and normal LV/RV function. Possible CHF in setting of SOB, LE swelling, and elevated pro-BNP  - Strict I/O's, daily weights.  - Patient may be volume down in setting of rising PATRICIA s/p diuresis and no IVF (feeds over only past few days), however would like to keep dry in setting of ARDS  - Holding home antihypertensives, on pressors  - Continue with atorvastatin 10 mg    # GI - No issues. OG tube inplace with feeds.     # Renal: S/p transplant in 2013 on immunosuppression with mycophenolate and tacrolimus. Baseline Cr 1.2-1.5. Cr 2.23 on admission, now continuing to rise. Will likely need HD today. PATRICIA may be in setting of tacrolimus in sepsis. Patient may also have pre-renal component in setting of sepsis/hypotension, particularly as Cr demonstrated some recovery on admission, followed by 80 IV lasix and no IVF since patient's Cr has steadily risen. Last FENa .3 and FEUrea 6% on 12/18. Complicated by the fact that patient must be kept dry for ARDS  - Urinalysis, urine electrolytes, monitor UOP  - HD today  - Parry removed today.    # ID: Infectious work-up negative to date. S/p 5 days Vanc/Zosyn. S/p Azithro and 2 days of bactrim for suspected PCP. CMV negative, will follow-up BK  - monitor off abx  - follow up BK virus     # Endocrine: Now on solumedrol, monitoring FS q6h with sliding scale    # DVT ppx w/ HSQ      For changes in clinical status or to get in touch with family, call Vin Usman (Brother) cell - [phone]

## 2017-12-21 NOTE — PROGRESS NOTE ADULT - PROBLEM SELECTOR PLAN 2
Patient with kidney transplantation (DDRT) in 2013. Recommend to decrease tacrolimus to 1 mg BID.  goal tacro level 4 to 5 in setting of possible infection and hemodynamic PATRICIA.  Myfortic on hold as patient with possible sepsis. Stared on solumedrol by MICU team yesterday for pneumonitis.  Monitor tacrolimus level.  Check medications for interactions through cy pathway.

## 2017-12-21 NOTE — PROGRESS NOTE ADULT - SUBJECTIVE AND OBJECTIVE BOX
Interval events:     Review of Systems:  Limited by intubation    T(F): 97.7 (12-21-17 @ 04:00), Max: 98.7 (12-20-17 @ 20:00)  HR: 93 (12-21-17 @ 07:27) (73 - 98)  BP: 132/51 (12-21-17 @ 06:00) (118/50 - 139/57)  RR: 26 (12-21-17 @ 06:00) (26 - 26)  SpO2: 92% (12-21-17 @ 07:27) (92% - 94%)  Wt(kg): --    Mode: AC/ CMV (Assist Control/ Continuous Mandatory Ventilation), RR (machine): 26, TV (machine): 450, FiO2: 60, PEEP: 10    CAPILLARY BLOOD GLUCOSE        I&O's Summary    20 Dec 2017 07:01  -  21 Dec 2017 07:00  --------------------------------------------------------  IN: 1425.6 mL / OUT: 648 mL / NET: 777.6 mL        Physical Exam:     Neuro: Limited by intubation and paralysis  HEENT: EOMI, PERRL  CV: RRR  Pulm: Course  GI: Nondistended  Ext: Mild edema  Skin: No rash    Meds:  heparin  Injectable 5000 Unit(s) SubCutaneous every 8 hours      norepinephrine Infusion 0.1 MICROgram(s)/kG/Min IV Continuous <Continuous>    atorvastatin 10 milliGRAM(s) Oral at bedtime  methylPREDNISolone sodium succinate Injectable 50 milliGRAM(s) IV Push every 12 hours      cisatracurium Infusion 1.8 MICROgram(s)/kG/Min IV Continuous <Continuous>  midazolam Infusion 3 mG/Hr IV Continuous <Continuous>  propofol Infusion 50 MICROgram(s)/kG/Min IV Continuous <Continuous>            tacrolimus 1 milliGRAM(s) Oral every 12 hours    chlorhexidine 0.12% Liquid 15 milliLiter(s) Swish and Spit two times a day  chlorhexidine 4% Liquid 1 Application(s) Topical daily  petrolatum Ophthalmic Ointment 1 Application(s) Both EYES two times a day    sevelamer carbonate Powder 1600 milliGRAM(s) Oral three times a day with meals                                8.3    23.17 )-----------( 343      ( 21 Dec 2017 03:40 )             27.7       12-21    133<L>  |  92<L>  |  135<H>  ----------------------------<  189<H>  5.5<H>   |  19<L>  |  5.75<H>    Ca    8.0<L>      21 Dec 2017 03:40  Phos  11.3     12-21  Mg     2.7     12-21    TPro  5.1<L>  /  Alb  2.6<L>  /  TBili  < 0.2<L>  /  DBili  x   /  AST  29  /  ALT  24  /  AlkPhos  95  12-21          PT/INR - ( 21 Dec 2017 03:40 )   PT: 11.2 SEC;   INR: 0.97          PTT - ( 21 Dec 2017 03:40 )  PTT:26.1 SEC    PLEURAL FLUID -- -- 12-17 @ 15:50  PLEURAL FLUID -- -- 12-17 @ 11:17  BLOOD PERIPHERAL -- -- 12-16 @ 14:18

## 2017-12-21 NOTE — PROGRESS NOTE ADULT - SUBJECTIVE AND OBJECTIVE BOX
[facility] Division of Kidney Diseases & Hypertension  FOLLOW UP NOTE  --------------------------------------------------------------------------------    72-year-old male with h/o ESRD s/p kidney transplantation in 2013, CKD stage III, prostate cancer and HTN presented with complaints of SOB and cough for the past 3 days. Patient is currently in MICU for management of respiratory failure. Nephrology was consulted for elevated Scr and and management of transplant immunosuppression. On review of previous records in [facility], patient had DDRT done on January 2013 at [facility]. Post transplant course was complicated by delayed graft function and perforated bladder. Patient currently follows with his outpatient nephrologist Dr. Luevano for kidney transplant management. Scr was 1.37 on labs done on 10/30/17. His outpatient transplant medications are tacrolimus 2 mg PO BID, and myfortic 360 mg PO BID.  He was intubated due to worsening respiratory status. Patient seen and examined in MICU; currently intubated and sedated.  Overnight urine output has started to increase however now has worsening uremia and worsening hyperkalemia.      PAST HISTORY  --------------------------------------------------------------------------------  No significant changes to PMH, PSH, FHx, SHx, unless otherwise noted    ALLERGIES & MEDICATIONS  --------------------------------------------------------------------------------  Allergies    No Known Allergies    Intolerances      Standing Inpatient Medications  atorvastatin 10 milliGRAM(s) Oral at bedtime  chlorhexidine 0.12% Liquid 15 milliLiter(s) Swish and Spit two times a day  chlorhexidine 4% Liquid 1 Application(s) Topical daily  cisatracurium Infusion 1.8 MICROgram(s)/kG/Min IV Continuous <Continuous>  dextrose 5%. 1000 milliLiter(s) IV Continuous <Continuous>  dextrose 50% Injectable 12.5 Gram(s) IV Push once  dextrose 50% Injectable 25 Gram(s) IV Push once  dextrose 50% Injectable 25 Gram(s) IV Push once  heparin  Injectable 5000 Unit(s) SubCutaneous every 8 hours  insulin lispro (HumaLOG) corrective regimen sliding scale   SubCutaneous every 6 hours  methylPREDNISolone sodium succinate Injectable 50 milliGRAM(s) IV Push every 12 hours  midazolam Infusion 3 mG/Hr IV Continuous <Continuous>  norepinephrine Infusion 0.1 MICROgram(s)/kG/Min IV Continuous <Continuous>  petrolatum Ophthalmic Ointment 1 Application(s) Both EYES two times a day  propofol Infusion 50 MICROgram(s)/kG/Min IV Continuous <Continuous>  sevelamer carbonate Powder 1600 milliGRAM(s) Oral three times a day with meals  tacrolimus 1 milliGRAM(s) Oral every 12 hours    PRN Inpatient Medications  dextrose Gel 1 Dose(s) Oral once PRN  glucagon  Injectable 1 milliGRAM(s) IntraMuscular once PRN      REVIEW OF SYSTEMS  --------------------------------------------------------------------------------  unable to obtain    VITALS/PHYSICAL EXAM  --------------------------------------------------------------------------------  T(C): 36.7 (12-21-17 @ 08:00), Max: 37.1 (12-20-17 @ 20:00)  HR: 87 (12-21-17 @ 08:00) (77 - 98)  BP: 111/46 (12-21-17 @ 08:00) (111/46 - 139/57)  RR: 24 (12-21-17 @ 08:00) (24 - 26)  SpO2: 90% (12-21-17 @ 08:00) (90% - 95%)  Wt(kg): --        12-20-17 @ 07:01  -  12-21-17 @ 07:00  --------------------------------------------------------  IN: 1425.6 mL / OUT: 648 mL / NET: 777.6 mL    12-21-17 @ 07:01  -  12-21-17 @ 08:24  --------------------------------------------------------  IN: 45.5 mL / OUT: 150 mL / NET: -104.5 mL      Physical Exam:  	Gen: no acute distress.  	HEENT: no JVD  	Pulm: coarse bilateral breath sounds heard today  	CV: S1S2+  	Abd: soft, allograft site: non tender  	Skin: Warm  	Vascular access: Right AVF site: skin intact, thrill felt               Neuro: sedated              : marie catheter in place.    LABS/STUDIES  --------------------------------------------------------------------------------              8.3    23.17 >-----------<  343      [12-21-17 @ 03:40]              27.7     133  |  92  |  135  ----------------------------<  189      [12-21-17 @ 03:40]  5.5   |  19  |  5.75        Ca     8.0     [12-21-17 @ 03:40]      Mg     2.7     [12-21-17 @ 03:40]      Phos  11.3     [12-21-17 @ 03:40]    TPro  5.1  /  Alb  2.6  /  TBili  < 0.2  /  DBili  x   /  AST  29  /  ALT  24  /  AlkPhos  95  [12-21-17 @ 03:40]    PT/INR: PT 11.2 , INR 0.97       [12-21-17 @ 03:40]  PTT: 26.1       [12-21-17 @ 03:40]      Creatinine Trend:  SCr 5.75 [12-21 @ 03:40]  SCr 5.32 [12-20 @ 05:00]  SCr 4.35 [12-19 @ 02:55]  SCr 3.47 [12-18 @ 12:20]  SCr 3.20 [12-18 @ 03:40]    Urinalysis - [12-18-17 @ 09:35]      Color YELLOW / Appearance HAZY / SG 1.022 / pH 5.5      Gluc NEGATIVE / Ketone NEGATIVE  / Bili NEGATIVE / Urobili NORMAL       Blood MODERATE / Protein 100 / Leuk Est LARGE / Nitrite NEGATIVE      RBC >50 / WBC >50 / Hyaline  / Gran  / Sq Epi OCC / Non Sq Epi  / Bacteria     Urine Creatinine 194.64      [12-18-17 @ 09:35]  Urine Sodium 27      [12-18-17 @ 09:35]  Urine Urea Nitrogen 310.7      [12-18-17 @ 09:35]

## 2017-12-21 NOTE — PROGRESS NOTE ADULT - PROBLEM SELECTOR PLAN 1
PATRICIA in setting of critical illness Scr. is 5.75 today which is plateaued. Patient is on IV vasopressors. Patient is oliguric but improving.  Possibly component of tacrolimus related hemodynamic injury on top of hypotension now on lower tacro dose.  At this point would recommend to monitor urine output and labs.  Urgent HD indicated today for hyperkalemia and uremia.  No UF.  Clearance only.

## 2017-12-22 DIAGNOSIS — E83.39 OTHER DISORDERS OF PHOSPHORUS METABOLISM: ICD-10-CM

## 2017-12-22 LAB
ALBUMIN SERPL ELPH-MCNC: 2.6 G/DL — LOW (ref 3.3–5)
ALP SERPL-CCNC: 90 U/L — SIGNIFICANT CHANGE UP (ref 40–120)
ALT FLD-CCNC: 24 U/L — SIGNIFICANT CHANGE UP (ref 4–41)
APTT BLD: 26.2 SEC — LOW (ref 27.5–37.4)
AST SERPL-CCNC: 39 U/L — SIGNIFICANT CHANGE UP (ref 4–40)
BASOPHILS # BLD AUTO: 0.02 K/UL — SIGNIFICANT CHANGE UP (ref 0–0.2)
BASOPHILS NFR BLD AUTO: 0.1 % — SIGNIFICANT CHANGE UP (ref 0–2)
BILIRUB SERPL-MCNC: 0.2 MG/DL — SIGNIFICANT CHANGE UP (ref 0.2–1.2)
BUN SERPL-MCNC: 111 MG/DL — HIGH (ref 7–23)
BUN SERPL-MCNC: 81 MG/DL — HIGH (ref 7–23)
CALCIUM SERPL-MCNC: 7.7 MG/DL — LOW (ref 8.4–10.5)
CALCIUM SERPL-MCNC: 7.7 MG/DL — LOW (ref 8.4–10.5)
CHLORIDE SERPL-SCNC: 89 MMOL/L — LOW (ref 98–107)
CHLORIDE SERPL-SCNC: 89 MMOL/L — LOW (ref 98–107)
CO2 SERPL-SCNC: 24 MMOL/L — SIGNIFICANT CHANGE UP (ref 22–31)
CO2 SERPL-SCNC: 24 MMOL/L — SIGNIFICANT CHANGE UP (ref 22–31)
CREAT SERPL-MCNC: 3 MG/DL — HIGH (ref 0.5–1.3)
CREAT SERPL-MCNC: 4.34 MG/DL — HIGH (ref 0.5–1.3)
EOSINOPHIL # BLD AUTO: 0 K/UL — SIGNIFICANT CHANGE UP (ref 0–0.5)
EOSINOPHIL NFR BLD AUTO: 0 % — SIGNIFICANT CHANGE UP (ref 0–6)
GLUCOSE BLDC GLUCOMTR-MCNC: 139 MG/DL — HIGH (ref 70–99)
GLUCOSE BLDC GLUCOMTR-MCNC: 149 MG/DL — HIGH (ref 70–99)
GLUCOSE BLDC GLUCOMTR-MCNC: 154 MG/DL — HIGH (ref 70–99)
GLUCOSE BLDC GLUCOMTR-MCNC: 179 MG/DL — HIGH (ref 70–99)
GLUCOSE BLDC GLUCOMTR-MCNC: 200 MG/DL — HIGH (ref 70–99)
GLUCOSE SERPL-MCNC: 166 MG/DL — HIGH (ref 70–99)
GLUCOSE SERPL-MCNC: 208 MG/DL — HIGH (ref 70–99)
HBV CORE AB SER-ACNC: NONREACTIVE — SIGNIFICANT CHANGE UP
HBV SURFACE AB SER-ACNC: 61.6 MLU/ML — SIGNIFICANT CHANGE UP
HCT VFR BLD CALC: 24.9 % — LOW (ref 39–50)
HCV AB S/CO SERPL IA: 0.06 S/CO — SIGNIFICANT CHANGE UP
HCV AB SERPL-IMP: SIGNIFICANT CHANGE UP
HGB BLD-MCNC: 7.9 G/DL — LOW (ref 13–17)
IMM GRANULOCYTES # BLD AUTO: 0.69 # — SIGNIFICANT CHANGE UP
IMM GRANULOCYTES NFR BLD AUTO: 3 % — HIGH (ref 0–1.5)
INR BLD: 1 — SIGNIFICANT CHANGE UP (ref 0.88–1.17)
LYMPHOCYTES # BLD AUTO: 0.36 K/UL — LOW (ref 1–3.3)
LYMPHOCYTES # BLD AUTO: 1.6 % — LOW (ref 13–44)
MAGNESIUM SERPL-MCNC: 2.3 MG/DL — SIGNIFICANT CHANGE UP (ref 1.6–2.6)
MAGNESIUM SERPL-MCNC: 2.6 MG/DL — SIGNIFICANT CHANGE UP (ref 1.6–2.6)
MCHC RBC-ENTMCNC: 21.3 PG — LOW (ref 27–34)
MCHC RBC-ENTMCNC: 31.7 % — LOW (ref 32–36)
MCV RBC AUTO: 67.1 FL — LOW (ref 80–100)
MONOCYTES # BLD AUTO: 0.93 K/UL — HIGH (ref 0–0.9)
MONOCYTES NFR BLD AUTO: 4.1 % — SIGNIFICANT CHANGE UP (ref 2–14)
NEUTROPHILS # BLD AUTO: 20.72 K/UL — HIGH (ref 1.8–7.4)
NEUTROPHILS NFR BLD AUTO: 91.2 % — HIGH (ref 43–77)
NRBC # FLD: 0.11 — SIGNIFICANT CHANGE UP
PHOSPHATE SERPL-MCNC: 10.2 MG/DL — HIGH (ref 2.5–4.5)
PHOSPHATE SERPL-MCNC: 7.6 MG/DL — HIGH (ref 2.5–4.5)
PLATELET # BLD AUTO: 359 K/UL — SIGNIFICANT CHANGE UP (ref 150–400)
PMV BLD: 9.3 FL — SIGNIFICANT CHANGE UP (ref 7–13)
POTASSIUM SERPL-MCNC: 5.1 MMOL/L — SIGNIFICANT CHANGE UP (ref 3.5–5.3)
POTASSIUM SERPL-MCNC: 5.6 MMOL/L — HIGH (ref 3.5–5.3)
POTASSIUM SERPL-SCNC: 5.1 MMOL/L — SIGNIFICANT CHANGE UP (ref 3.5–5.3)
POTASSIUM SERPL-SCNC: 5.6 MMOL/L — HIGH (ref 3.5–5.3)
PROT SERPL-MCNC: 5 G/DL — LOW (ref 6–8.3)
PROTHROM AB SERPL-ACNC: 11.1 SEC — SIGNIFICANT CHANGE UP (ref 9.8–13.1)
RBC # BLD: 3.71 M/UL — LOW (ref 4.2–5.8)
RBC # FLD: 20.3 % — HIGH (ref 10.3–14.5)
REVIEW TO FOLLOW: YES — SIGNIFICANT CHANGE UP
SODIUM SERPL-SCNC: 131 MMOL/L — LOW (ref 135–145)
SODIUM SERPL-SCNC: 133 MMOL/L — LOW (ref 135–145)
TACROLIMUS SERPL-MCNC: 4.5 NG/ML — SIGNIFICANT CHANGE UP
WBC # BLD: 22.72 K/UL — HIGH (ref 3.8–10.5)
WBC # FLD AUTO: 22.72 K/UL — HIGH (ref 3.8–10.5)

## 2017-12-22 PROCEDURE — 99233 SBSQ HOSP IP/OBS HIGH 50: CPT | Mod: GC

## 2017-12-22 PROCEDURE — 99291 CRITICAL CARE FIRST HOUR: CPT

## 2017-12-22 RX ORDER — PHENYLEPHRINE HYDROCHLORIDE 10 MG/ML
1 INJECTION INTRAVENOUS
Qty: 160 | Refills: 0 | Status: DISCONTINUED | OUTPATIENT
Start: 2017-12-22 | End: 2017-12-22

## 2017-12-22 RX ORDER — METOPROLOL TARTRATE 50 MG
2.5 TABLET ORAL ONCE
Qty: 0 | Refills: 0 | Status: COMPLETED | OUTPATIENT
Start: 2017-12-22 | End: 2017-12-22

## 2017-12-22 RX ORDER — INSULIN HUMAN 100 [IU]/ML
10 INJECTION, SOLUTION SUBCUTANEOUS ONCE
Qty: 0 | Refills: 0 | Status: COMPLETED | OUTPATIENT
Start: 2017-12-22 | End: 2017-12-22

## 2017-12-22 RX ORDER — METOCLOPRAMIDE HCL 10 MG
10 TABLET ORAL ONCE
Qty: 0 | Refills: 0 | Status: COMPLETED | OUTPATIENT
Start: 2017-12-22 | End: 2017-12-22

## 2017-12-22 RX ORDER — DEXTROSE 50 % IN WATER 50 %
50 SYRINGE (ML) INTRAVENOUS ONCE
Qty: 0 | Refills: 0 | Status: COMPLETED | OUTPATIENT
Start: 2017-12-22 | End: 2017-12-22

## 2017-12-22 RX ADMIN — TACROLIMUS 1 MILLIGRAM(S): 5 CAPSULE ORAL at 20:29

## 2017-12-22 RX ADMIN — SEVELAMER CARBONATE 1600 MILLIGRAM(S): 2400 POWDER, FOR SUSPENSION ORAL at 08:50

## 2017-12-22 RX ADMIN — MIDAZOLAM HYDROCHLORIDE 3 MG/HR: 1 INJECTION, SOLUTION INTRAMUSCULAR; INTRAVENOUS at 09:03

## 2017-12-22 RX ADMIN — CHLORHEXIDINE GLUCONATE 15 MILLILITER(S): 213 SOLUTION TOPICAL at 17:45

## 2017-12-22 RX ADMIN — Medication 1: at 06:35

## 2017-12-22 RX ADMIN — Medication 50 MILLIGRAM(S): at 09:03

## 2017-12-22 RX ADMIN — ATORVASTATIN CALCIUM 10 MILLIGRAM(S): 80 TABLET, FILM COATED ORAL at 22:27

## 2017-12-22 RX ADMIN — Medication 1 APPLICATION(S): at 17:45

## 2017-12-22 RX ADMIN — SEVELAMER CARBONATE 1600 MILLIGRAM(S): 2400 POWDER, FOR SUSPENSION ORAL at 17:45

## 2017-12-22 RX ADMIN — TACROLIMUS 1 MILLIGRAM(S): 5 CAPSULE ORAL at 05:57

## 2017-12-22 RX ADMIN — HEPARIN SODIUM 5000 UNIT(S): 5000 INJECTION INTRAVENOUS; SUBCUTANEOUS at 22:27

## 2017-12-22 RX ADMIN — HEPARIN SODIUM 5000 UNIT(S): 5000 INJECTION INTRAVENOUS; SUBCUTANEOUS at 13:23

## 2017-12-22 RX ADMIN — PROPOFOL 25.08 MICROGRAM(S)/KG/MIN: 10 INJECTION, EMULSION INTRAVENOUS at 09:03

## 2017-12-22 RX ADMIN — Medication 2.5 MILLIGRAM(S): at 09:25

## 2017-12-22 RX ADMIN — Medication: at 00:47

## 2017-12-22 RX ADMIN — SEVELAMER CARBONATE 1600 MILLIGRAM(S): 2400 POWDER, FOR SUSPENSION ORAL at 11:13

## 2017-12-22 RX ADMIN — HEPARIN SODIUM 5000 UNIT(S): 5000 INJECTION INTRAVENOUS; SUBCUTANEOUS at 05:56

## 2017-12-22 RX ADMIN — Medication 10 MILLIGRAM(S): at 20:29

## 2017-12-22 RX ADMIN — Medication 1: at 17:45

## 2017-12-22 RX ADMIN — CHLORHEXIDINE GLUCONATE 1 APPLICATION(S): 213 SOLUTION TOPICAL at 11:13

## 2017-12-22 RX ADMIN — INSULIN HUMAN 10 UNIT(S): 100 INJECTION, SOLUTION SUBCUTANEOUS at 08:49

## 2017-12-22 RX ADMIN — Medication 50 MILLIGRAM(S): at 22:26

## 2017-12-22 RX ADMIN — Medication 1 APPLICATION(S): at 05:57

## 2017-12-22 RX ADMIN — CHLORHEXIDINE GLUCONATE 15 MILLILITER(S): 213 SOLUTION TOPICAL at 05:57

## 2017-12-22 RX ADMIN — Medication 50 MILLILITER(S): at 08:49

## 2017-12-22 RX ADMIN — PHENYLEPHRINE HYDROCHLORIDE 15.68 MICROGRAM(S)/KG/MIN: 10 INJECTION INTRAVENOUS at 11:12

## 2017-12-22 NOTE — CHART NOTE - NSCHARTNOTEFT_GEN_A_CORE
: Curly Gupta    INDICATION: Worsening renal function.    PROCEDURE:  [ ] LIMITED ECHO  [ ] LIMITED CHEST  [ ] LIMITED RETROPERITONEAL  [X] LIMITED ABDOMINAL  [ ] LIMITED DVT  [ ] NEEDLE GUIDANCE VASCULAR  [ ] NEEDLE GUIDANCE THORACENTESIS  [ ] NEEDLE GUIDANCE PARACENTESIS  [ ] NEEDLE GUIDANCE PERICARDIOCENTESIS  [ ] OTHER    FINDINGS: Mild dilatation of the transplanted kidney noted indicating mild hydronephrosis. Moderate amount of urine in the bladder.     INTERPRETATION: Mild hydronephrosis. Will give a trial of marie catheter placement.

## 2017-12-22 NOTE — PROGRESS NOTE ADULT - PROBLEM SELECTOR PLAN 2
Patient with kidney transplantation (DDRT) in . C/w tacrolimus 1 mg BID.  goal tacro level 4 to 5 in setting of possible infection and hemodynamic PATRICIA.  Myfortic on hold as patient with possible sepsis. Currently on solumedrol for possible pneumonitis.  Monitor tacrolimus level.  Check medications for interactions through cy pathway.

## 2017-12-22 NOTE — PROGRESS NOTE ADULT - ASSESSMENT
72M PMH Prostate cancer (per Heme/Onc, dx 2006, surgery 2007, on Zoladex until 2015, followed by casodex, Xtandi, Zytiga, and now on taxotere with good PSA response. 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 Last chemo December 1st), ESRD s/p renal transplant w/CKDIII (2013, on Mycophenolate and tacrolimus, baseline Cr 1.2 – 1.5), HTN, who presents with 3 weeks of cough and 2 days of rapidly increasing SOB, found to be septic likely due to taxotere induced lung injury    # Neuro: Sedated, intubated, paralyzed.     # Pulm: Intubated 12/17 for likely ARDS, all presumed etiologies ruled out at this time.  - urine legionella, BAL, transbronchial bx, bcx, sputum all negative  - concern remains for pneumonitis, had been on taxotere since Aug with 3 weeks on and 1 week off cycles, last dose Dec 1st  - taxotere has been associated w/ ARDS and ILD.   - will cont to cover w/ abx given imm supp 2/2 tacrolimus and cellcept.   - Discontinued all abx 12/20 for negative infectious work-up  - Stopping paralysis today  - started solu-medrol IV 50mg BID on 12/18 for suspected ILD inflammatory response.     # Card: Last TTE in 2011 with EF 70% and normal LV/RV function. Possible CHF in setting of SOB, LE swelling, and elevated pro-BNP  - Strict I/O's, daily weights.  - Patient may be volume down in setting of rising PATRICIA s/p diuresis and no IVF (feeds over only past few days), however would like to keep dry in setting of ARDS  - Holding home antihypertensives, on pressors  - Continue with atorvastatin 10 mg    # GI - No issues. OG tube inplace with feeds.     # Renal: S/p transplant in 2013 on immunosuppression with mycophenolate and tacrolimus. Baseline Cr 1.2-1.5. Cr 2.23 on admission, now continuing to rise. Will likely need HD today. PATRICIA may be in setting of tacrolimus in sepsis. Patient may also have pre-renal component in setting of sepsis/hypotension, particularly as Cr demonstrated some recovery on admission, followed by 80 IV lasix and no IVF since patient's Cr has steadily risen. Last FENa .3 and FEUrea 6% on 12/18. Complicated by the fact that patient must be kept dry for ARDS  - Urinalysis, urine electrolytes, monitor UOP  - HD today  - Parry removed today.    # ID: Infectious work-up negative to date. S/p 5 days Vanc/Zosyn. S/p Azithro and 2 days of bactrim for suspected PCP. CMV negative, will follow-up BK  - monitor off abx  - follow up BK virus     # Endocrine: Now on solumedrol, monitoring FS q6h with sliding scale    # DVT ppx w/ HSQ      For changes in clinical status or to get in touch with family, call Vin Usman (Brother) cell - [phone]

## 2017-12-22 NOTE — PROGRESS NOTE ADULT - SUBJECTIVE AND OBJECTIVE BOX
Division of Kidney Diseases & Hypertension  FOLLOW UP NOTE  [phone]--------------------------------------------------------------------------------    72-year-old male with h/o ESRD s/p kidney transplantation in 2013, CKD stage III, prostate cancer and HTN presented with complaints of SOB and cough for the past 3 days. Patient is currently in MICU for management of respiratory failure. Nephrology was consulted for elevated Scr and and management of transplant immunosuppression. On review [facility], patient had DDRT done on January 2013 at [facility]. Post transplant course was complicated by delayed graft function and perforated bladder. Patient currently follows with his outpatient nephrologist Dr. Luevano for kidney transplant management. Scr was 1.37 on labs done on 10/30/17. His outpatient transplant medications are tacrolimus 2 mg PO BID, and myfortic 360 mg PO BID.  He was intubated due to worsening respiratory status. Patient was initiated on HD on 12/21/17 due to worsening uremia and hyperkalemia. Patient seen and examined in MICU.  currently intubated and sedated.       PAST HISTORY  --------------------------------------------------------------------------------  No significant changes to PMH, PSH, FHx, SHx, unless otherwise noted    ALLERGIES & MEDICATIONS  --------------------------------------------------------------------------------  Allergies    No Known Allergies    Intolerances      Standing Inpatient Medications  atorvastatin 10 milliGRAM(s) Oral at bedtime  chlorhexidine 0.12% Liquid 15 milliLiter(s) Swish and Spit two times a day  chlorhexidine 4% Liquid 1 Application(s) Topical daily  dextrose 5%. 1000 milliLiter(s) IV Continuous <Continuous>  dextrose 50% Injectable 12.5 Gram(s) IV Push once  dextrose 50% Injectable 25 Gram(s) IV Push once  dextrose 50% Injectable 25 Gram(s) IV Push once  heparin  Injectable 5000 Unit(s) SubCutaneous every 8 hours  insulin lispro (HumaLOG) corrective regimen sliding scale   SubCutaneous every 6 hours  methylPREDNISolone sodium succinate Injectable 50 milliGRAM(s) IV Push every 12 hours  midazolam Infusion 3 mG/Hr IV Continuous <Continuous>  petrolatum Ophthalmic Ointment 1 Application(s) Both EYES two times a day  propofol Infusion 50 MICROgram(s)/kG/Min IV Continuous <Continuous>  sevelamer carbonate Powder 1600 milliGRAM(s) Oral three times a day with meals  tacrolimus 1 milliGRAM(s) Oral every 12 hours    PRN Inpatient Medications  dextrose Gel 1 Dose(s) Oral once PRN  glucagon  Injectable 1 milliGRAM(s) IntraMuscular once PRN      REVIEW OF SYSTEMS  --------------------------------------------------------------------------------  Unable to obtain.     VITALS/PHYSICAL EXAM  --------------------------------------------------------------------------------  T(C): 36 (12-22-17 @ 04:00), Max: 36.9 (12-21-17 @ 16:00)  HR: 88 (12-22-17 @ 07:05) (80 - 90)  BP: 150/55 (12-22-17 @ 07:00) (110/43 - 150/55)  RR: 31 (12-22-17 @ 07:00) (22 - 31)  SpO2: 90% (12-22-17 @ 07:05) (90% - 93%)  Wt(kg): --        12-21-17 @ 07:01  -  12-22-17 @ 07:00  --------------------------------------------------------  IN: 1001.5 mL / OUT: 510 mL / NET: 491.5 mL      Physical Exam:  	Gen: no acute distress.  	HEENT: no JVD  	Pulm: coarse bilateral breath sounds heard today  	CV: S1S2+  	Abd: soft, allograft site: non tender  	Skin: Warm  	Vascular access: Right AVF site: skin intact, thrill felt               Neuro: sedated    LABS/STUDIES  --------------------------------------------------------------------------------              7.9    22.72 >-----------<  359      [12-22-17 @ 02:48]              24.9     133  |  89  |  111  ----------------------------<  166      [12-22-17 @ 02:48]  5.6   |  24  |  4.34        Ca     7.7     [12-22-17 @ 02:48]      Mg     2.6     [12-22-17 @ 02:48]      Phos  10.2     [12-22-17 @ 02:48]    TPro  5.0  /  Alb  2.6  /  TBili  0.2  /  DBili  x   /  AST  39  /  ALT  24  /  AlkPhos  90  [12-22-17 @ 02:48]    PT/INR: PT 11.1 , INR 1.00       [12-22-17 @ 02:48]  PTT: 26.2       [12-22-17 @ 02:48]      Creatinine Trend:  SCr 4.34 [12-22 @ 02:48]  SCr 5.75 [12-21 @ 03:40]  SCr 5.32 [12-20 @ 05:00]  SCr 4.35 [12-19 @ 02:55]  SCr 3.47 [12-18 @ 12:20]    Urinalysis - [12-18-17 @ 09:35]      Color YELLOW / Appearance HAZY / SG 1.022 / pH 5.5      Gluc NEGATIVE / Ketone NEGATIVE  / Bili NEGATIVE / Urobili NORMAL       Blood MODERATE / Protein 100 / Leuk Est LARGE / Nitrite NEGATIVE      RBC >50 / WBC >50 / Hyaline  / Gran  / Sq Epi OCC / Non Sq Epi  / Bacteria     Urine Creatinine 194.64      [12-18-17 @ 09:35]  Urine Sodium 27      [12-18-17 @ 09:35]  Urine Urea Nitrogen 310.7      [12-18-17 @ 09:35]      HBsAb 61.6      [12-21-17 @ 17:10]  HBsAg NEGATIVE      [12-21-17 @ 17:10]  HBcAb Nonreactive      [12-21-17 @ 17:10]  HCV 0.06, Nonreactive Hepatitis C AB  S/CO Ratio                        Interpretation  < 1.0                                     Non-Reactive  1.0 - 4.9                           Weakly-Reactive  > 5.0                                 Reactive  Non-Reactive: Aperson with a non-reactive HCV antibody  result is considered uninfected.  No further action is  needed unless recent infection is suspected.  In these  cases, consider repeat testing later to detect  seroconversion..  Weakly-Reactive: HCV antibody test is abnormal, HCV RNA  Qualitative test will follow.  Reactive: HCV antibody test is abnormal, HCV RNA  Qualitative test will follow.  Note: HCV antibody testing is performed on the [employer] system.      [12-21-17 @ 17:10]    ANCA: cANCA Negative, pANCA Negative, atypical ANCA --      [12-20-17 @ 05:00]

## 2017-12-22 NOTE — PROGRESS NOTE ADULT - SUBJECTIVE AND OBJECTIVE BOX
Interval events:     Review of Systems:  Constitutional: no fever, chills, fatigue  Neuro: no headache, numbness, weakness  Resp: no cough, wheezing, shortness of breath  CVS: no chest pain, palpitations, leg swelling  GI: no abdominal pain, nausea, vomiting, diarrhea   : no dysuria, frequency, incontinence  Skin: no itching, burning, rashes, or lesions   Msk: no joint pain or swelling  Psych: no depression, anxiety    T(F): 96.8 (12-22-17 @ 04:00), Max: 98.5 (12-21-17 @ 16:00)  HR: 88 (12-22-17 @ 07:05) (80 - 90)  BP: 150/55 (12-22-17 @ 07:00) (110/43 - 150/55)  RR: 31 (12-22-17 @ 07:00) (22 - 31)  SpO2: 90% (12-22-17 @ 07:05) (90% - 93%)  Wt(kg): --    Mode: AC/ CMV (Assist Control/ Continuous Mandatory Ventilation), RR (machine): 26, TV (machine): 450, FiO2: 60, PEEP: 10    CAPILLARY BLOOD GLUCOSE      POCT Blood Glucose.: 179 mg/dL (22 Dec 2017 06:13)    I&O's Summary    21 Dec 2017 07:01  -  22 Dec 2017 07:00  --------------------------------------------------------  IN: 1001.5 mL / OUT: 510 mL / NET: 491.5 mL        Physical Exam:     Gen:  Neuro:  HEENT:  CV:  Pulm:  GI:  Ext:  Skin:    Meds:  heparin  Injectable 5000 Unit(s) SubCutaneous every 8 hours        atorvastatin 10 milliGRAM(s) Oral at bedtime  dextrose 50% Injectable 12.5 Gram(s) IV Push once  dextrose 50% Injectable 25 Gram(s) IV Push once  dextrose 50% Injectable 25 Gram(s) IV Push once  dextrose Gel 1 Dose(s) Oral once PRN  glucagon  Injectable 1 milliGRAM(s) IntraMuscular once PRN  insulin lispro (HumaLOG) corrective regimen sliding scale   SubCutaneous every 6 hours  methylPREDNISolone sodium succinate Injectable 50 milliGRAM(s) IV Push every 12 hours      midazolam Infusion 3 mG/Hr IV Continuous <Continuous>  propofol Infusion 50 MICROgram(s)/kG/Min IV Continuous <Continuous>          dextrose 5%. 1000 milliLiter(s) IV Continuous <Continuous>    tacrolimus 1 milliGRAM(s) Oral every 12 hours    chlorhexidine 0.12% Liquid 15 milliLiter(s) Swish and Spit two times a day  chlorhexidine 4% Liquid 1 Application(s) Topical daily  petrolatum Ophthalmic Ointment 1 Application(s) Both EYES two times a day    sevelamer carbonate Powder 1600 milliGRAM(s) Oral three times a day with meals                                7.9    22.72 )-----------( 359      ( 22 Dec 2017 02:48 )             24.9       12-22    133<L>  |  89<L>  |  111<H>  ----------------------------<  166<H>  5.6<H>   |  24  |  4.34<H>    Ca    7.7<L>      22 Dec 2017 02:48  Phos  10.2     12-22  Mg     2.6     12-22    TPro  5.0<L>  /  Alb  2.6<L>  /  TBili  0.2  /  DBili  x   /  AST  39  /  ALT  24  /  AlkPhos  90  12-22          PT/INR - ( 22 Dec 2017 02:48 )   PT: 11.1 SEC;   INR: 1.00          PTT - ( 22 Dec 2017 02:48 )  PTT:26.2 SEC    PLEURAL FLUID -- -- 12-17 @ 15:50  PLEURAL FLUID -- -- 12-17 @ 11:17            CENTRAL LINE: Y/N   DATE INSERTED:   REMOVE: Y/N    RICHARDSON: Y/N      DATE INSERTED:        REMOVE: Y/N    A-LINE: Y/N     DATE INSERTED:              REMOVE: Y/N    GLOBAL ISSUE/BEST PRACTICE:  Analgesia:  Sedation:  HOB elevation: yes  Stress ulcer prophylaxis:  VTE prophylaxis:  Glycemic control:  Nutrition:    CODE STATUS: ***  [facility] discussion: Y  Critical care time spent (mins): ***

## 2017-12-22 NOTE — PROGRESS NOTE ADULT - PROBLEM SELECTOR PLAN 3
Serum phosphorus noted to be 10.2. Currently on sevelamer 1600 mg TID. Monitor serum phosphorus levels.

## 2017-12-23 LAB
ALBUMIN SERPL ELPH-MCNC: 2.9 G/DL — LOW (ref 3.3–5)
ALP SERPL-CCNC: 119 U/L — SIGNIFICANT CHANGE UP (ref 40–120)
ALT FLD-CCNC: 56 U/L — HIGH (ref 4–41)
ANISOCYTOSIS BLD QL: SLIGHT — SIGNIFICANT CHANGE UP
APTT BLD: 23.7 SEC — LOW (ref 27.5–37.4)
AST SERPL-CCNC: 75 U/L — HIGH (ref 4–40)
BASOPHILS # BLD AUTO: 0.07 K/UL — SIGNIFICANT CHANGE UP (ref 0–0.2)
BASOPHILS NFR BLD AUTO: 0.2 % — SIGNIFICANT CHANGE UP (ref 0–2)
BASOPHILS NFR SPEC: 0 % — SIGNIFICANT CHANGE UP (ref 0–2)
BILIRUB SERPL-MCNC: 0.3 MG/DL — SIGNIFICANT CHANGE UP (ref 0.2–1.2)
BLASTS # FLD: 0 % — SIGNIFICANT CHANGE UP (ref 0–0)
BUN SERPL-MCNC: 96 MG/DL — HIGH (ref 7–23)
C DIFF TOX GENS STL QL NAA+PROBE: SIGNIFICANT CHANGE UP
CALCIUM SERPL-MCNC: 8 MG/DL — LOW (ref 8.4–10.5)
CHLORIDE SERPL-SCNC: 90 MMOL/L — LOW (ref 98–107)
CO2 SERPL-SCNC: 22 MMOL/L — SIGNIFICANT CHANGE UP (ref 22–31)
CREAT SERPL-MCNC: 3.4 MG/DL — HIGH (ref 0.5–1.3)
DACRYOCYTES BLD QL SMEAR: SLIGHT — SIGNIFICANT CHANGE UP
EOSINOPHIL # BLD AUTO: 0.01 K/UL — SIGNIFICANT CHANGE UP (ref 0–0.5)
EOSINOPHIL NFR BLD AUTO: 0 % — SIGNIFICANT CHANGE UP (ref 0–6)
EOSINOPHIL NFR FLD: 0 % — SIGNIFICANT CHANGE UP (ref 0–6)
GIANT PLATELETS BLD QL SMEAR: PRESENT — SIGNIFICANT CHANGE UP
GLUCOSE BLDC GLUCOMTR-MCNC: 135 MG/DL — HIGH (ref 70–99)
GLUCOSE BLDC GLUCOMTR-MCNC: 154 MG/DL — HIGH (ref 70–99)
GLUCOSE BLDC GLUCOMTR-MCNC: 206 MG/DL — HIGH (ref 70–99)
GLUCOSE SERPL-MCNC: 183 MG/DL — HIGH (ref 70–99)
HCT VFR BLD CALC: 27.5 % — LOW (ref 39–50)
HGB BLD-MCNC: 8.6 G/DL — LOW (ref 13–17)
HYPOCHROMIA BLD QL: SIGNIFICANT CHANGE UP
IMM GRANULOCYTES # BLD AUTO: 3.1 # — SIGNIFICANT CHANGE UP
IMM GRANULOCYTES NFR BLD AUTO: 7.6 % — HIGH (ref 0–1.5)
INR BLD: 0.97 — SIGNIFICANT CHANGE UP (ref 0.88–1.17)
LYMPHOCYTES # BLD AUTO: 1.23 K/UL — SIGNIFICANT CHANGE UP (ref 1–3.3)
LYMPHOCYTES # BLD AUTO: 3 % — LOW (ref 13–44)
LYMPHOCYTES NFR SPEC AUTO: 2.8 % — LOW (ref 13–44)
MAGNESIUM SERPL-MCNC: 2.5 MG/DL — SIGNIFICANT CHANGE UP (ref 1.6–2.6)
MCHC RBC-ENTMCNC: 21.3 PG — LOW (ref 27–34)
MCHC RBC-ENTMCNC: 31.3 % — LOW (ref 32–36)
MCV RBC AUTO: 68.2 FL — LOW (ref 80–100)
METAMYELOCYTES # FLD: 0.9 % — SIGNIFICANT CHANGE UP (ref 0–1)
MICROCYTES BLD QL: SLIGHT — SIGNIFICANT CHANGE UP
MONOCYTES # BLD AUTO: 2.57 K/UL — HIGH (ref 0–0.9)
MONOCYTES NFR BLD AUTO: 6.3 % — SIGNIFICANT CHANGE UP (ref 2–14)
MONOCYTES NFR BLD: 6.5 % — SIGNIFICANT CHANGE UP (ref 2–9)
MYELOCYTES NFR BLD: 0.9 % — HIGH (ref 0–0)
NEUTROPHIL AB SER-ACNC: 88.9 % — HIGH (ref 43–77)
NEUTROPHILS # BLD AUTO: 33.58 K/UL — HIGH (ref 1.8–7.4)
NEUTROPHILS NFR BLD AUTO: 82.9 % — HIGH (ref 43–77)
NEUTS BAND # BLD: 0 % — SIGNIFICANT CHANGE UP (ref 0–6)
NRBC # FLD: 0.69 — SIGNIFICANT CHANGE UP
NRBC FLD-RTO: 1.7 — SIGNIFICANT CHANGE UP
OTHER - HEMATOLOGY %: 0 — SIGNIFICANT CHANGE UP
OVALOCYTES BLD QL SMEAR: SIGNIFICANT CHANGE UP
PHOSPHATE SERPL-MCNC: 8.4 MG/DL — HIGH (ref 2.5–4.5)
PLATELET # BLD AUTO: 531 K/UL — HIGH (ref 150–400)
PLATELET COUNT - ESTIMATE: SIGNIFICANT CHANGE UP
PMV BLD: 9.7 FL — SIGNIFICANT CHANGE UP (ref 7–13)
POIKILOCYTOSIS BLD QL AUTO: SLIGHT — SIGNIFICANT CHANGE UP
POTASSIUM SERPL-MCNC: 5.7 MMOL/L — HIGH (ref 3.5–5.3)
POTASSIUM SERPL-SCNC: 5.7 MMOL/L — HIGH (ref 3.5–5.3)
PROMYELOCYTES # FLD: 0 % — SIGNIFICANT CHANGE UP (ref 0–0)
PROT SERPL-MCNC: 5.6 G/DL — LOW (ref 6–8.3)
PROTHROM AB SERPL-ACNC: 11.2 SEC — SIGNIFICANT CHANGE UP (ref 9.8–13.1)
RBC # BLD: 4.03 M/UL — LOW (ref 4.2–5.8)
RBC # FLD: 20.6 % — HIGH (ref 10.3–14.5)
SODIUM SERPL-SCNC: 133 MMOL/L — LOW (ref 135–145)
TACROLIMUS SERPL-MCNC: 3.3 NG/ML — SIGNIFICANT CHANGE UP
VARIANT LYMPHS # BLD: 0 % — SIGNIFICANT CHANGE UP
WBC # BLD: 40.56 K/UL — CRITICAL HIGH (ref 3.8–10.5)
WBC # FLD AUTO: 40.56 K/UL — CRITICAL HIGH (ref 3.8–10.5)

## 2017-12-23 PROCEDURE — 71010: CPT | Mod: 26

## 2017-12-23 PROCEDURE — 99233 SBSQ HOSP IP/OBS HIGH 50: CPT | Mod: GC

## 2017-12-23 PROCEDURE — 71010: CPT | Mod: 26,77

## 2017-12-23 PROCEDURE — 99291 CRITICAL CARE FIRST HOUR: CPT

## 2017-12-23 PROCEDURE — 32551 INSERTION OF CHEST TUBE: CPT | Mod: GC

## 2017-12-23 RX ORDER — METRONIDAZOLE 500 MG
TABLET ORAL
Qty: 0 | Refills: 0 | Status: DISCONTINUED | OUTPATIENT
Start: 2017-12-23 | End: 2017-12-23

## 2017-12-23 RX ORDER — METRONIDAZOLE 500 MG
500 TABLET ORAL ONCE
Qty: 0 | Refills: 0 | Status: COMPLETED | OUTPATIENT
Start: 2017-12-23 | End: 2017-12-23

## 2017-12-23 RX ORDER — HEPARIN SODIUM 5000 [USP'U]/ML
5000 INJECTION INTRAVENOUS; SUBCUTANEOUS EVERY 8 HOURS
Qty: 0 | Refills: 0 | Status: DISCONTINUED | OUTPATIENT
Start: 2017-12-23 | End: 2017-12-25

## 2017-12-23 RX ORDER — DEXTROSE 50 % IN WATER 50 %
50 SYRINGE (ML) INTRAVENOUS ONCE
Qty: 0 | Refills: 0 | Status: COMPLETED | OUTPATIENT
Start: 2017-12-23 | End: 2017-12-23

## 2017-12-23 RX ORDER — INSULIN HUMAN 100 [IU]/ML
10 INJECTION, SOLUTION SUBCUTANEOUS ONCE
Qty: 0 | Refills: 0 | Status: DISCONTINUED | OUTPATIENT
Start: 2017-12-23 | End: 2017-12-23

## 2017-12-23 RX ORDER — ALBUTEROL 90 UG/1
2.5 AEROSOL, METERED ORAL ONCE
Qty: 0 | Refills: 0 | Status: DISCONTINUED | OUTPATIENT
Start: 2017-12-23 | End: 2017-12-23

## 2017-12-23 RX ORDER — VANCOMYCIN HCL 1 G
500 VIAL (EA) INTRAVENOUS EVERY 6 HOURS
Qty: 0 | Refills: 0 | Status: DISCONTINUED | OUTPATIENT
Start: 2017-12-23 | End: 2017-12-23

## 2017-12-23 RX ORDER — FENTANYL CITRATE 50 UG/ML
100 INJECTION INTRAVENOUS ONCE
Qty: 0 | Refills: 0 | Status: DISCONTINUED | OUTPATIENT
Start: 2017-12-23 | End: 2017-12-24

## 2017-12-23 RX ORDER — SODIUM POLYSTYRENE SULFONATE 4.1 MEQ/G
30 POWDER, FOR SUSPENSION ORAL ONCE
Qty: 0 | Refills: 0 | Status: DISCONTINUED | OUTPATIENT
Start: 2017-12-23 | End: 2017-12-23

## 2017-12-23 RX ORDER — INSULIN HUMAN 100 [IU]/ML
10 INJECTION, SOLUTION SUBCUTANEOUS ONCE
Qty: 0 | Refills: 0 | Status: COMPLETED | OUTPATIENT
Start: 2017-12-23 | End: 2017-12-23

## 2017-12-23 RX ORDER — METRONIDAZOLE 500 MG
500 TABLET ORAL EVERY 8 HOURS
Qty: 0 | Refills: 0 | Status: DISCONTINUED | OUTPATIENT
Start: 2017-12-23 | End: 2017-12-23

## 2017-12-23 RX ORDER — MIDAZOLAM HYDROCHLORIDE 1 MG/ML
3 INJECTION, SOLUTION INTRAMUSCULAR; INTRAVENOUS
Qty: 100 | Refills: 0 | Status: DISCONTINUED | OUTPATIENT
Start: 2017-12-23 | End: 2017-12-24

## 2017-12-23 RX ORDER — PROPOFOL 10 MG/ML
50 INJECTION, EMULSION INTRAVENOUS
Qty: 1000 | Refills: 0 | Status: DISCONTINUED | OUTPATIENT
Start: 2017-12-23 | End: 2017-12-28

## 2017-12-23 RX ADMIN — HEPARIN SODIUM 5000 UNIT(S): 5000 INJECTION INTRAVENOUS; SUBCUTANEOUS at 21:28

## 2017-12-23 RX ADMIN — SEVELAMER CARBONATE 1600 MILLIGRAM(S): 2400 POWDER, FOR SUSPENSION ORAL at 08:10

## 2017-12-23 RX ADMIN — TACROLIMUS 1 MILLIGRAM(S): 5 CAPSULE ORAL at 08:10

## 2017-12-23 RX ADMIN — SEVELAMER CARBONATE 1600 MILLIGRAM(S): 2400 POWDER, FOR SUSPENSION ORAL at 18:17

## 2017-12-23 RX ADMIN — Medication 50 MILLILITER(S): at 05:06

## 2017-12-23 RX ADMIN — Medication 50 MILLIGRAM(S): at 21:28

## 2017-12-23 RX ADMIN — Medication 1 APPLICATION(S): at 05:07

## 2017-12-23 RX ADMIN — INSULIN HUMAN 10 UNIT(S): 100 INJECTION, SOLUTION SUBCUTANEOUS at 05:06

## 2017-12-23 RX ADMIN — FENTANYL CITRATE 100 MICROGRAM(S): 50 INJECTION INTRAVENOUS at 23:15

## 2017-12-23 RX ADMIN — Medication 50 MILLIGRAM(S): at 10:30

## 2017-12-23 RX ADMIN — CHLORHEXIDINE GLUCONATE 1 APPLICATION(S): 213 SOLUTION TOPICAL at 11:25

## 2017-12-23 RX ADMIN — SEVELAMER CARBONATE 1600 MILLIGRAM(S): 2400 POWDER, FOR SUSPENSION ORAL at 11:26

## 2017-12-23 RX ADMIN — PROPOFOL 25.08 MICROGRAM(S)/KG/MIN: 10 INJECTION, EMULSION INTRAVENOUS at 15:40

## 2017-12-23 RX ADMIN — HEPARIN SODIUM 5000 UNIT(S): 5000 INJECTION INTRAVENOUS; SUBCUTANEOUS at 05:07

## 2017-12-23 RX ADMIN — CHLORHEXIDINE GLUCONATE 15 MILLILITER(S): 213 SOLUTION TOPICAL at 05:07

## 2017-12-23 RX ADMIN — MIDAZOLAM HYDROCHLORIDE 3 MG/HR: 1 INJECTION, SOLUTION INTRAMUSCULAR; INTRAVENOUS at 05:07

## 2017-12-23 RX ADMIN — Medication 1: at 12:30

## 2017-12-23 RX ADMIN — TACROLIMUS 1 MILLIGRAM(S): 5 CAPSULE ORAL at 18:22

## 2017-12-23 RX ADMIN — Medication 1 APPLICATION(S): at 18:17

## 2017-12-23 RX ADMIN — Medication 100 MILLIGRAM(S): at 05:07

## 2017-12-23 RX ADMIN — ATORVASTATIN CALCIUM 10 MILLIGRAM(S): 80 TABLET, FILM COATED ORAL at 21:28

## 2017-12-23 RX ADMIN — PROPOFOL 25.08 MICROGRAM(S)/KG/MIN: 10 INJECTION, EMULSION INTRAVENOUS at 05:07

## 2017-12-23 RX ADMIN — FENTANYL CITRATE 100 MICROGRAM(S): 50 INJECTION INTRAVENOUS at 23:30

## 2017-12-23 RX ADMIN — HEPARIN SODIUM 5000 UNIT(S): 5000 INJECTION INTRAVENOUS; SUBCUTANEOUS at 15:39

## 2017-12-23 RX ADMIN — CHLORHEXIDINE GLUCONATE 15 MILLILITER(S): 213 SOLUTION TOPICAL at 18:17

## 2017-12-23 NOTE — PROCEDURE NOTE - PROCEDURE
<<-----Click on this checkbox to enter Procedure Chest tube insertion  12/23/2017    Active  CFRYMAN

## 2017-12-23 NOTE — PROGRESS NOTE ADULT - PROBLEM SELECTOR PLAN 1
PATRICIA in setting of critical illness and elevated tacrolimus dose.  Now non oliguric but volume overloaded and hyperkalemic.  Serum K+ is 5.7 today. Will plan for third session of HD today with 500 cc UF. Monitor Scr and urine output.  Recommend strict I+O.

## 2017-12-23 NOTE — PROGRESS NOTE ADULT - SUBJECTIVE AND OBJECTIVE BOX
Interval events: Patient received HD yesterday with hyperkalemia. Hyperkalemic again this AM to 5.7, given 10U insulin and dextrose. Kayexelate given. No peaked T's. No major changes in ventilatory status. [facility] discussion initiated with brother over phone yesterday.    Review of Systems:  Limited by intubation and clinical status    T(F): 97.7 (12-23-17 @ 00:00), Max: 97.7 (12-23-17 @ 00:00)  HR: 109 (12-23-17 @ 07:09) (85 - 121)  BP: 110/55 (12-23-17 @ 07:00) (91/49 - 153/57)  RR: 36 (12-23-17 @ 07:00) (24 - 36)  SpO2: 93% (12-23-17 @ 07:09) (90% - 98%)  Wt(kg): --    Mode: AC/ CMV (Assist Control/ Continuous Mandatory Ventilation), RR (machine): 26, TV (machine): 450, FiO2: 60, PEEP: 10    CAPILLARY BLOOD GLUCOSE      POCT Blood Glucose.: 206 mg/dL (23 Dec 2017 05:03)    I&O's Summary    22 Dec 2017 07:01  -  23 Dec 2017 07:00  --------------------------------------------------------  IN: 1844.8 mL / OUT: 1825 mL / NET: 19.8 mL        Physical Exam:     Gen:  Neuro:  HEENT:  CV:  Pulm:  GI:  Ext:  Skin:    Meds:  heparin  Injectable 5000 Unit(s) SubCutaneous every 8 hours        atorvastatin 10 milliGRAM(s) Oral at bedtime  dextrose 50% Injectable 12.5 Gram(s) IV Push once  dextrose 50% Injectable 25 Gram(s) IV Push once  dextrose 50% Injectable 25 Gram(s) IV Push once  dextrose Gel 1 Dose(s) Oral once PRN  glucagon  Injectable 1 milliGRAM(s) IntraMuscular once PRN  insulin lispro (HumaLOG) corrective regimen sliding scale   SubCutaneous every 6 hours  methylPREDNISolone sodium succinate Injectable 50 milliGRAM(s) IV Push every 12 hours      midazolam Infusion 3 mG/Hr IV Continuous <Continuous>  propofol Infusion 50 MICROgram(s)/kG/Min IV Continuous <Continuous>          dextrose 5%. 1000 milliLiter(s) IV Continuous <Continuous>    tacrolimus 1 milliGRAM(s) Oral every 12 hours    chlorhexidine 0.12% Liquid 15 milliLiter(s) Swish and Spit two times a day  chlorhexidine 4% Liquid 1 Application(s) Topical daily  petrolatum Ophthalmic Ointment 1 Application(s) Both EYES two times a day    sevelamer carbonate Powder 1600 milliGRAM(s) Oral three times a day with meals  sodium polystyrene sulfonate Suspension 30 Gram(s) Oral once                                8.6    40.56 )-----------( 531      ( 23 Dec 2017 02:35 )             27.5     Bands 0    12-23    133<L>  |  90<L>  |  96<H>  ----------------------------<  183<H>  5.7<H>   |  22  |  3.40<H>    Ca    8.0<L>      23 Dec 2017 02:35  Phos  8.4     12-23  Mg     2.5     12-23    TPro  5.6<L>  /  Alb  2.9<L>  /  TBili  0.3  /  DBili  x   /  AST  75<H>  /  ALT  56<H>  /  AlkPhos  119  12-23          PT/INR - ( 23 Dec 2017 02:35 )   PT: 11.2 SEC;   INR: 0.97          PTT - ( 23 Dec 2017 02:35 )  PTT:23.7 SEC              CENTRAL LINE: Y/N   DATE INSERTED:   REMOVE: Y/N    RICHARDSON: Y/N      DATE INSERTED:        REMOVE: Y/N    A-LINE: Y/N     DATE INSERTED:              REMOVE: Y/N    GLOBAL ISSUE/BEST PRACTICE:  Analgesia:  Sedation:  HOB elevation: yes  Stress ulcer prophylaxis:  VTE prophylaxis:  Glycemic control:  Nutrition:    CODE STATUS: ***  [facility] discussion: Y  Critical care time spent (mins): *** Interval events: Patient received HD yesterday with hyperkalemia. Hyperkalemic again this AM to 5.7, given 10U insulin and dextrose. Kayexelate given. No peaked T's. No major changes in ventilatory status. [facility] discussion initiated with brother over phone yesterday. This morning, patient had increased crepitus at neck and was found on CXR to have R pneumothorax requiring placement of chest tube. Brother was called with case discuessed. Patient was made DNR, with plan not to do compressions should his heart stop, and to consider terminal extubation within the next few days should improvement not be demonstrated.    Review of Systems:  Limited by intubation and clinical status    T(F): 97.7 (12-23-17 @ 00:00), Max: 97.7 (12-23-17 @ 00:00)  HR: 109 (12-23-17 @ 07:09) (85 - 121)  BP: 110/55 (12-23-17 @ 07:00) (91/49 - 153/57)  RR: 36 (12-23-17 @ 07:00) (24 - 36)  SpO2: 93% (12-23-17 @ 07:09) (90% - 98%)  Wt(kg): --    Mode: AC/ CMV (Assist Control/ Continuous Mandatory Ventilation), RR (machine): 26, TV (machine): 450, FiO2: 60, PEEP: 10    CAPILLARY BLOOD GLUCOSE      POCT Blood Glucose.: 206 mg/dL (23 Dec 2017 05:03)    I&O's Summary    22 Dec 2017 07:01  -  23 Dec 2017 07:00  --------------------------------------------------------  IN: 1844.8 mL / OUT: 1825 mL / NET: 19.8 mL        Physical Exam:     Gen: Intubated, sedated. Chest tube placed at bedside without complication  Neuro: Unable to evaluate in clinical status. Sedated  HEENT: EOMI, PERRL  CV: RRR, no AFib at the moment  Pulm: intubated,   GI:  Ext:  Skin:    Meds:  heparin  Injectable 5000 Unit(s) SubCutaneous every 8 hours        atorvastatin 10 milliGRAM(s) Oral at bedtime  dextrose 50% Injectable 12.5 Gram(s) IV Push once  dextrose 50% Injectable 25 Gram(s) IV Push once  dextrose 50% Injectable 25 Gram(s) IV Push once  dextrose Gel 1 Dose(s) Oral once PRN  glucagon  Injectable 1 milliGRAM(s) IntraMuscular once PRN  insulin lispro (HumaLOG) corrective regimen sliding scale   SubCutaneous every 6 hours  methylPREDNISolone sodium succinate Injectable 50 milliGRAM(s) IV Push every 12 hours      midazolam Infusion 3 mG/Hr IV Continuous <Continuous>  propofol Infusion 50 MICROgram(s)/kG/Min IV Continuous <Continuous>          dextrose 5%. 1000 milliLiter(s) IV Continuous <Continuous>    tacrolimus 1 milliGRAM(s) Oral every 12 hours    chlorhexidine 0.12% Liquid 15 milliLiter(s) Swish and Spit two times a day  chlorhexidine 4% Liquid 1 Application(s) Topical daily  petrolatum Ophthalmic Ointment 1 Application(s) Both EYES two times a day    sevelamer carbonate Powder 1600 milliGRAM(s) Oral three times a day with meals  sodium polystyrene sulfonate Suspension 30 Gram(s) Oral once                                8.6    40.56 )-----------( 531      ( 23 Dec 2017 02:35 )             27.5     Bands 0    12-23    133<L>  |  90<L>  |  96<H>  ----------------------------<  183<H>  5.7<H>   |  22  |  3.40<H>    Ca    8.0<L>      23 Dec 2017 02:35  Phos  8.4     12-23  Mg     2.5     12-23    TPro  5.6<L>  /  Alb  2.9<L>  /  TBili  0.3  /  DBili  x   /  AST  75<H>  /  ALT  56<H>  /  AlkPhos  119  12-23          PT/INR - ( 23 Dec 2017 02:35 )   PT: 11.2 SEC;   INR: 0.97          PTT - ( 23 Dec 2017 02:35 )  PTT:23.7 SEC              CENTRAL LINE: Y/N   DATE INSERTED:   REMOVE: Y/N    RICHARDSON: Y/N      DATE INSERTED:        REMOVE: Y/N    A-LINE: Y/N     DATE INSERTED:              REMOVE: Y/N    GLOBAL ISSUE/BEST PRACTICE:  Analgesia:  Sedation:  HOB elevation: yes  Stress ulcer prophylaxis:  VTE prophylaxis:  Glycemic control:  Nutrition:    CODE STATUS: ***  [facility] discussion: Y  Critical care time spent (mins): *** Interval events: Patient received HD yesterday with hyperkalemia. Hyperkalemic again this AM to 5.7, given 10U insulin and dextrose. Kayexelate given. No peaked T's. No major changes in ventilatory status. [facility] discussion initiated with brother over phone yesterday. This morning, patient had increased crepitus at neck and was found on CXR to have R pneumothorax requiring placement of chest tube. Brother was called with case discuessed. Patient was made DNR, with plan not to do compressions should his heart stop, and to consider terminal extubation within the next few days should improvement not be demonstrated.    Review of Systems:  Limited by intubation and clinical status    T(F): 97.7 (12-23-17 @ 00:00), Max: 97.7 (12-23-17 @ 00:00)  HR: 109 (12-23-17 @ 07:09) (85 - 121)  BP: 110/55 (12-23-17 @ 07:00) (91/49 - 153/57)  RR: 36 (12-23-17 @ 07:00) (24 - 36)  SpO2: 93% (12-23-17 @ 07:09) (90% - 98%)  Wt(kg): --    Mode: AC/ CMV (Assist Control/ Continuous Mandatory Ventilation), RR (machine): 26, TV (machine): 450, FiO2: 60, PEEP: 10    CAPILLARY BLOOD GLUCOSE      POCT Blood Glucose.: 206 mg/dL (23 Dec 2017 05:03)    I&O's Summary    22 Dec 2017 07:01  -  23 Dec 2017 07:00  --------------------------------------------------------  IN: 1844.8 mL / OUT: 1825 mL / NET: 19.8 mL        Physical Exam:     Gen: Intubated, sedated. Chest tube placed at bedside without complication  Neuro: Unable to evaluate in clinical status. Sedated  HEENT: EOMI, PERRL  CV: RRR, no AFib at the moment  Pulm: intubated, not triggering vent  GI: Diarrhea with rectal tube in place. On OG tube feeds  Skin: No rash or breakdown    Meds:  heparin  Injectable 5000 Unit(s) SubCutaneous every 8 hours        atorvastatin 10 milliGRAM(s) Oral at bedtime  dextrose 50% Injectable 12.5 Gram(s) IV Push once  dextrose 50% Injectable 25 Gram(s) IV Push once  dextrose 50% Injectable 25 Gram(s) IV Push once  dextrose Gel 1 Dose(s) Oral once PRN  glucagon  Injectable 1 milliGRAM(s) IntraMuscular once PRN  insulin lispro (HumaLOG) corrective regimen sliding scale   SubCutaneous every 6 hours  methylPREDNISolone sodium succinate Injectable 50 milliGRAM(s) IV Push every 12 hours      midazolam Infusion 3 mG/Hr IV Continuous <Continuous>  propofol Infusion 50 MICROgram(s)/kG/Min IV Continuous <Continuous>          dextrose 5%. 1000 milliLiter(s) IV Continuous <Continuous>    tacrolimus 1 milliGRAM(s) Oral every 12 hours    chlorhexidine 0.12% Liquid 15 milliLiter(s) Swish and Spit two times a day  chlorhexidine 4% Liquid 1 Application(s) Topical daily  petrolatum Ophthalmic Ointment 1 Application(s) Both EYES two times a day    sevelamer carbonate Powder 1600 milliGRAM(s) Oral three times a day with meals  sodium polystyrene sulfonate Suspension 30 Gram(s) Oral once                                8.6    40.56 )-----------( 531      ( 23 Dec 2017 02:35 )             27.5     Bands 0    12-23    133<L>  |  90<L>  |  96<H>  ----------------------------<  183<H>  5.7<H>   |  22  |  3.40<H>    Ca    8.0<L>      23 Dec 2017 02:35  Phos  8.4     12-23  Mg     2.5     12-23    TPro  5.6<L>  /  Alb  2.9<L>  /  TBili  0.3  /  DBili  x   /  AST  75<H>  /  ALT  56<H>  /  AlkPhos  119  12-23          PT/INR - ( 23 Dec 2017 02:35 )   PT: 11.2 SEC;   INR: 0.97          PTT - ( 23 Dec 2017 02:35 )  PTT:23.7 SEC

## 2017-12-23 NOTE — PROGRESS NOTE ADULT - SUBJECTIVE AND OBJECTIVE BOX
[facility] Division of Kidney Diseases & Hypertension  FOLLOW UP NOTE  --------------------------------------------------------------------------------    HPI: 72-year-old male with h/o ESRD s/p kidney transplantation in 2013, CKD stage III, prostate cancer and HTN presented with complaints of SOB and cough for the past 3 days. Patient is currently in MICU for management of respiratory failure. Nephrology was consulted for elevated Scr and and management of transplant immunosuppression. On review of previous records in [facility], patient had DDRT done on January 2013 at [facility]. Post transplant course was complicated by delayed graft function and perforated bladder. Patient currently follows with his outpatient nephrologist Dr. Luevano for kidney transplant management. Scr was 1.37 on labs done on 10/30/17. His outpatient transplant medications are tacrolimus 2 mg PO BID, and myfortic 360 mg PO BID.  He was intubated due to worsening respiratory status. Patient was initiated on HD on 12/21/17 due to worsening uremia and hyperkalemia. Dialyzed again 12/22 for hyperkalemia.  Patient had rapid heart rate yesterday requiring cardizem.  Today patient seen and evaluated in MICU still with 60% FIO2 requirement and gross anasarca.      PAST HISTORY  --------------------------------------------------------------------------------  No significant changes to PMH, PSH, FHx, SHx, unless otherwise noted    ALLERGIES & MEDICATIONS  --------------------------------------------------------------------------------  Allergies    No Known Allergies    Intolerances      Standing Inpatient Medications  atorvastatin 10 milliGRAM(s) Oral at bedtime  chlorhexidine 0.12% Liquid 15 milliLiter(s) Swish and Spit two times a day  chlorhexidine 4% Liquid 1 Application(s) Topical daily  dextrose 5%. 1000 milliLiter(s) IV Continuous <Continuous>  dextrose 50% Injectable 12.5 Gram(s) IV Push once  dextrose 50% Injectable 25 Gram(s) IV Push once  dextrose 50% Injectable 25 Gram(s) IV Push once  heparin  Injectable 5000 Unit(s) SubCutaneous every 8 hours  insulin lispro (HumaLOG) corrective regimen sliding scale   SubCutaneous every 6 hours  methylPREDNISolone sodium succinate Injectable 50 milliGRAM(s) IV Push every 12 hours  midazolam Infusion 3 mG/Hr IV Continuous <Continuous>  petrolatum Ophthalmic Ointment 1 Application(s) Both EYES two times a day  propofol Infusion 50 MICROgram(s)/kG/Min IV Continuous <Continuous>  sevelamer carbonate Powder 1600 milliGRAM(s) Oral three times a day with meals  tacrolimus 1 milliGRAM(s) Oral every 12 hours    PRN Inpatient Medications  dextrose Gel 1 Dose(s) Oral once PRN  glucagon  Injectable 1 milliGRAM(s) IntraMuscular once PRN      REVIEW OF SYSTEMS  --------------------------------------------------------------------------------  unable to obtain    VITALS/PHYSICAL EXAM  --------------------------------------------------------------------------------  T(C): 36.5 (12-23-17 @ 00:00), Max: 36.5 (12-23-17 @ 00:00)  HR: 109 (12-23-17 @ 07:09) (85 - 121)  BP: 110/55 (12-23-17 @ 07:00) (91/49 - 153/57)  RR: 36 (12-23-17 @ 07:00) (24 - 36)  SpO2: 93% (12-23-17 @ 07:09) (90% - 98%)  Wt(kg): --        12-22-17 @ 07:01  -  12-23-17 @ 07:00  --------------------------------------------------------  IN: 1844.8 mL / OUT: 1825 mL / NET: 19.8 mL      Physical Exam:  	Gen: NAD  	HEENT: mass noted under right mandible  	Pulm: CTA B/L anteriorly  	CV: S1S2; no rub  	Abd: soft, rectal tube  	: marie in place  	UE: anasarca  	LE: anasarca  	Neuro: sedated  	Skin: cool  	Vascular access: right upper extremity AVF    LABS/STUDIES  --------------------------------------------------------------------------------              8.6    40.56 >-----------<  531      [12-23-17 @ 02:35]              27.5     133  |  90  |  96  ----------------------------<  183      [12-23-17 @ 02:35]  5.7   |  22  |  3.40        Ca     8.0     [12-23-17 @ 02:35]      Mg     2.5     [12-23-17 @ 02:35]      Phos  8.4     [12-23-17 @ 02:35]    TPro  5.6  /  Alb  2.9  /  TBili  0.3  /  DBili  x   /  AST  75  /  ALT  56  /  AlkPhos  119  [12-23-17 @ 02:35]    PT/INR: PT 11.2 , INR 0.97       [12-23-17 @ 02:35]  PTT: 23.7       [12-23-17 @ 02:35]      Creatinine Trend:  SCr 3.40 [12-23 @ 02:35]  SCr 3.00 [12-22 @ 17:12]  SCr 4.34 [12-22 @ 02:48]  SCr 5.75 [12-21 @ 03:40]  SCr 5.32 [12-20 @ 05:00]    Urinalysis - [12-18-17 @ 09:35]      Color YELLOW / Appearance HAZY / SG 1.022 / pH 5.5      Gluc NEGATIVE / Ketone NEGATIVE  / Bili NEGATIVE / Urobili NORMAL       Blood MODERATE / Protein 100 / Leuk Est LARGE / Nitrite NEGATIVE      RBC >50 / WBC >50 / Hyaline  / Gran  / Sq Epi OCC / Non Sq Epi  / Bacteria     Urine Creatinine 194.64      [12-18-17 @ 09:35]  Urine Sodium 27      [12-18-17 @ 09:35]  Urine Urea Nitrogen 310.7      [12-18-17 @ 09:35]      HBsAb 61.6      [12-21-17 @ 17:10]  HBsAg NEGATIVE      [12-21-17 @ 17:10]  HBcAb Nonreactive      [12-21-17 @ 17:10]  HCV 0.06, Nonreactive Hepatitis C AB  S/CO Ratio                        Interpretation  < 1.0                                     Non-Reactive  1.0 - 4.9                           Weakly-Reactive  > 5.0                                 Reactive  Non-Reactive: Aperson with a non-reactive HCV antibody  result is considered uninfected.  No further action is  needed unless recent infection is suspected.  In these  cases, consider repeat testing later to detect  seroconversion..  Weakly-Reactive: HCV antibody test is abnormal, HCV RNA  Qualitative test will follow.  Reactive: HCV antibody test is abnormal, HCV RNA  Qualitative test will follow.  Note: HCV antibody testing is performed on the [employer]   system.      [12-21-17 @ 17:10]    ANCA: cANCA Negative, pANCA Negative, atypical ANCA --      [12-20-17 @ 05:00]

## 2017-12-23 NOTE — PROGRESS NOTE ADULT - ASSESSMENT
72M PMH Prostate cancer (per Heme/Onc, dx 2006, surgery 2007, on Zoladex until 2015, followed by casodex, Xtandi, Zytiga, and now on taxotere with good PSA response. 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 Last chemo December 1st), ESRD s/p renal transplant w/CKDIII (2013, on Mycophenolate and tacrolimus, baseline Cr 1.2 – 1.5), HTN, who presents with 3 weeks of cough and 2 days of rapidly increasing SOB, found to be septic likely due to taxotere induced lung injury    # Neuro: Sedated, intubated, paralyzed.     # Pulm: Intubated 12/17 for likely ARDS, all presumed etiologies ruled out at this time.  - urine legionella, BAL, transbronchial bx, bcx, sputum all negative  - concern remains for pneumonitis, had been on taxotere since Aug with 3 weeks on and 1 week off cycles, last dose Dec 1st  - taxotere has been associated w/ ARDS and ILD.   - will cont to cover w/ abx given imm supp 2/2 tacrolimus and cellcept.   - Discontinued all abx 12/20 for negative infectious work-up  - Stopping paralysis today  - started solu-medrol IV 50mg BID on 12/18 for suspected ILD inflammatory response.     # Card: Last TTE in 2011 with EF 70% and normal LV/RV function. Possible CHF in setting of SOB, LE swelling, and elevated pro-BNP  - Strict I/O's, daily weights.  - Patient may be volume down in setting of rising PATRICIA s/p diuresis and no IVF (feeds over only past few days), however would like to keep dry in setting of ARDS  - Holding home antihypertensives, on pressors  - Continue with atorvastatin 10 mg    # GI - No issues. OG tube inplace with feeds.     # Renal: S/p transplant in 2013 on immunosuppression with mycophenolate and tacrolimus. Baseline Cr 1.2-1.5. Cr 2.23 on admission, now continuing to rise. Will likely need HD today. PATRICIA may be in setting of tacrolimus in sepsis. Patient may also have pre-renal component in setting of sepsis/hypotension, particularly as Cr demonstrated some recovery on admission, followed by 80 IV lasix and no IVF since patient's Cr has steadily risen. Last FENa .3 and FEUrea 6% on 12/18. Complicated by the fact that patient must be kept dry for ARDS  - Urinalysis, urine electrolytes, monitor UOP  - HD today  - Parry in place    # ID: Infectious work-up negative to date. S/p 5 days Vanc/Zosyn. S/p Azithro and 2 days of bactrim for suspected PCP. CMV negative, will follow-up BK  - monitor off abx  - follow up BK virus     # Endocrine: Now on solumedrol, monitoring FS q6h with sliding scale    # DVT ppx w/ HSQ      For changes in clinical status or to get in touch with family, call Vin Mary (Brother) cell - [phone] 72M PMH Prostate cancer (per Heme/Onc, dx 2006, surgery 2007, on Zoladex until 2015, followed by casodex, Xtandi, Zytiga, and now on taxotere with good PSA response. 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 Last chemo December 1st), ESRD s/p renal transplant w/CKDIII (2013, on Mycophenolate and tacrolimus, baseline Cr 1.2 – 1.5), HTN, who presents with 3 weeks of cough and 2 days of rapidly increasing SOB, found to be septic likely due to taxotere induced lung injury    # Neuro: Sedated, intubated. Nimbex paralysis discontinued 12/22, will consider weaning sedation tomorrow    # Pulm: Intubated 12/17 for likely ARDS, all presumed infectious etiologies ruled out at this time.  - urine legionella, BAL, transbronchial bx, bcx, sputum all negative  - concern remains for pneumonitis, had been on taxotere since Aug with 3 weeks on and 1 week off cycles, last dose Dec 1st. Taxotere has been associated w/ ARDS and ILD.   - Abx discontinued 12/20 for negative infectious work-up  - started solu-medrol IV 50mg BID on 12/18 for suspected ILD inflammatory response.   - Pneumomediastinum s/p bronch. Now with pneumothorax requiring chest tube placement this AM    # Card: Last TTE in 2011 with EF 70% and normal LV/RV function. Possible CHF in setting of SOB, LE swelling, and elevated pro-BNP. Patient had episode of new AFib w/RVR requiring cardizem gtt and pressors on 12/22 which discontinued. No anticoagulation at this time, patient had chest tube placed this AM, he has had no further episodes of AFib. CHADSVASC score of 1. Will consider anticoagulation going forward  - Strict I/O's, daily weights.  - Patient may be volume down in setting of rising PATRICIA s/p diuresis and no IVF (feeds over only past few days), however would like to keep dry in setting of ARDS  - Holding home antihypertensives, on pressors  - Continue with atorvastatin 10 mg    # GI - No issues. OG tube inplace with feeds.     # Renal: S/p transplant in 2013 on immunosuppression with mycophenolate and tacrolimus. Baseline Cr 1.2-1.5. Cr 2.23 on admission, now continuing to rise. Will likely need HD today. PATRICIA may be in setting of tacrolimus in sepsis. Patient may also have pre-renal component in setting of sepsis/hypotension, particularly as Cr demonstrated some recovery on admission, followed by 80 IV lasix and no IVF since patient's Cr has steadily risen. Last FENa .3 and FEUrea 6% on 12/18. Complicated by the fact that patient must be kept dry for ARDS. Patient remains hyperkalemic despite repeated dialysis. Will repeat HD today.  - Urinalysis, urine electrolytes, monitor UOP  - HD today  - Parry in place    # ID: Infectious work-up negative to date. S/p 5 days Vanc/Zosyn. S/p Azithro and 2 days of bactrim for suspected PCP. CMV negative, will follow-up BK  - monitor off abx  - follow up BK virus     # Endocrine: Now on solumedrol, monitoring FS q6h with sliding scale    # DVT ppx w/ HSQ      For changes in clinical status or to get in touch with family, call Vin Mary (Brother) cell - [phone]

## 2017-12-23 NOTE — PROCEDURE NOTE - NSPROCDETAILS_GEN_ALL_CORE
sterile dressing applied/Seldinger technique/dressing applied/secured in place/supine position/percutaneous
patient pre-oxygenated, tube inserted, placement confirmed

## 2017-12-23 NOTE — PROGRESS NOTE ADULT - PROBLEM SELECTOR PLAN 2
Patient with kidney transplantation (DDRT) in . C/w tacrolimus 1 mg BID.  goal tacro level 4 to 5 in setting of possible infection and hemodynamic PATRICIA.  Last FK true trough was 4.7.  However, patient had cardizem which can increase levels.  Monitor trough.  Myfortic on hold as patient with possible sepsis / critical illness.  Currently on solumedrol for possible pneumonitis.  Monitor tacrolimus level.  Check new medications for interactions through cy pathway.

## 2017-12-24 LAB
ALBUMIN SERPL ELPH-MCNC: 2.4 G/DL — LOW (ref 3.3–5)
ALP SERPL-CCNC: 95 U/L — SIGNIFICANT CHANGE UP (ref 40–120)
ALT FLD-CCNC: 50 U/L — HIGH (ref 4–41)
APTT BLD: 24.9 SEC — LOW (ref 27.5–37.4)
AST SERPL-CCNC: 50 U/L — HIGH (ref 4–40)
BASE EXCESS BLDA CALC-SCNC: 0.5 MMOL/L — SIGNIFICANT CHANGE UP
BASE EXCESS BLDA CALC-SCNC: 2.6 MMOL/L — SIGNIFICANT CHANGE UP
BASOPHILS # BLD AUTO: 0.04 K/UL — SIGNIFICANT CHANGE UP (ref 0–0.2)
BASOPHILS NFR BLD AUTO: 0.1 % — SIGNIFICANT CHANGE UP (ref 0–2)
BILIRUB SERPL-MCNC: 0.2 MG/DL — SIGNIFICANT CHANGE UP (ref 0.2–1.2)
BLD GP AB SCN SERPL QL: NEGATIVE — SIGNIFICANT CHANGE UP
BUN SERPL-MCNC: 71 MG/DL — HIGH (ref 7–23)
CALCIUM SERPL-MCNC: 7.7 MG/DL — LOW (ref 8.4–10.5)
CHLORIDE BLDA-SCNC: 95 MMOL/L — LOW (ref 96–108)
CHLORIDE BLDA-SCNC: 97 MMOL/L — SIGNIFICANT CHANGE UP (ref 96–108)
CHLORIDE SERPL-SCNC: 94 MMOL/L — LOW (ref 98–107)
CO2 SERPL-SCNC: 26 MMOL/L — SIGNIFICANT CHANGE UP (ref 22–31)
CREAT SERPL-MCNC: 2.52 MG/DL — HIGH (ref 0.5–1.3)
EOSINOPHIL # BLD AUTO: 0.05 K/UL — SIGNIFICANT CHANGE UP (ref 0–0.5)
EOSINOPHIL NFR BLD AUTO: 0.1 % — SIGNIFICANT CHANGE UP (ref 0–6)
GLUCOSE BLDA-MCNC: 140 MG/DL — HIGH (ref 70–99)
GLUCOSE BLDA-MCNC: 178 MG/DL — HIGH (ref 70–99)
GLUCOSE BLDC GLUCOMTR-MCNC: 124 MG/DL — HIGH (ref 70–99)
GLUCOSE BLDC GLUCOMTR-MCNC: 138 MG/DL — HIGH (ref 70–99)
GLUCOSE BLDC GLUCOMTR-MCNC: 165 MG/DL — HIGH (ref 70–99)
GLUCOSE BLDC GLUCOMTR-MCNC: 169 MG/DL — HIGH (ref 70–99)
GLUCOSE BLDC GLUCOMTR-MCNC: 182 MG/DL — HIGH (ref 70–99)
GLUCOSE SERPL-MCNC: 161 MG/DL — HIGH (ref 70–99)
HCO3 BLDA-SCNC: 25 MMOL/L — SIGNIFICANT CHANGE UP (ref 22–26)
HCO3 BLDA-SCNC: 27 MMOL/L — HIGH (ref 22–26)
HCT VFR BLD CALC: 22.9 % — LOW (ref 39–50)
HCT VFR BLD CALC: 23.4 % — LOW (ref 39–50)
HCT VFR BLDA CALC: 22.5 % — LOW (ref 39–51)
HCT VFR BLDA CALC: 23.2 % — LOW (ref 39–51)
HGB BLD-MCNC: 7 G/DL — CRITICAL LOW (ref 13–17)
HGB BLD-MCNC: 7.2 G/DL — LOW (ref 13–17)
HGB BLDA-MCNC: 7.2 G/DL — LOW (ref 13–17)
HGB BLDA-MCNC: 7.4 G/DL — LOW (ref 13–17)
IMM GRANULOCYTES # BLD AUTO: 1.65 # — SIGNIFICANT CHANGE UP
IMM GRANULOCYTES NFR BLD AUTO: 4.5 % — HIGH (ref 0–1.5)
INR BLD: 1.02 — SIGNIFICANT CHANGE UP (ref 0.88–1.17)
LACTATE BLDA-SCNC: 1 MMOL/L — SIGNIFICANT CHANGE UP (ref 0.5–2)
LACTATE BLDA-SCNC: 1.3 MMOL/L — SIGNIFICANT CHANGE UP (ref 0.5–2)
LYMPHOCYTES # BLD AUTO: 0.7 K/UL — LOW (ref 1–3.3)
LYMPHOCYTES # BLD AUTO: 1.9 % — LOW (ref 13–44)
MAGNESIUM SERPL-MCNC: 2.3 MG/DL — SIGNIFICANT CHANGE UP (ref 1.6–2.6)
MANUAL SMEAR VERIFICATION: SIGNIFICANT CHANGE UP
MCHC RBC-ENTMCNC: 22.1 PG — LOW (ref 27–34)
MCHC RBC-ENTMCNC: 22.4 PG — LOW (ref 27–34)
MCHC RBC-ENTMCNC: 30.6 % — LOW (ref 32–36)
MCHC RBC-ENTMCNC: 30.8 % — LOW (ref 32–36)
MCV RBC AUTO: 71.8 FL — LOW (ref 80–100)
MCV RBC AUTO: 73.2 FL — LOW (ref 80–100)
MONOCYTES # BLD AUTO: 1.45 K/UL — HIGH (ref 0–0.9)
MONOCYTES NFR BLD AUTO: 4 % — SIGNIFICANT CHANGE UP (ref 2–14)
NEUTROPHILS # BLD AUTO: 32.38 K/UL — HIGH (ref 1.8–7.4)
NEUTROPHILS NFR BLD AUTO: 89.4 % — HIGH (ref 43–77)
NRBC # FLD: 0.29 — SIGNIFICANT CHANGE UP
NRBC # FLD: 0.69 — SIGNIFICANT CHANGE UP
NRBC FLD-RTO: 1.8 — SIGNIFICANT CHANGE UP
PCO2 BLDA: 26 MMHG — LOW (ref 35–48)
PCO2 BLDA: 46 MMHG — SIGNIFICANT CHANGE UP (ref 35–48)
PH BLDA: 7.36 PH — SIGNIFICANT CHANGE UP (ref 7.35–7.45)
PH BLDA: 7.59 PH — HIGH (ref 7.35–7.45)
PHOSPHATE SERPL-MCNC: 7.3 MG/DL — HIGH (ref 2.5–4.5)
PLATELET # BLD AUTO: 420 K/UL — HIGH (ref 150–400)
PLATELET # BLD AUTO: 462 K/UL — HIGH (ref 150–400)
PMV BLD: 9.7 FL — SIGNIFICANT CHANGE UP (ref 7–13)
PMV BLD: 9.8 FL — SIGNIFICANT CHANGE UP (ref 7–13)
PO2 BLDA: 148 MMHG — HIGH (ref 83–108)
PO2 BLDA: 185 MMHG — HIGH (ref 83–108)
POTASSIUM BLDA-SCNC: 4.4 MMOL/L — SIGNIFICANT CHANGE UP (ref 3.4–4.5)
POTASSIUM BLDA-SCNC: 4.9 MMOL/L — HIGH (ref 3.4–4.5)
POTASSIUM SERPL-MCNC: 4.9 MMOL/L — SIGNIFICANT CHANGE UP (ref 3.5–5.3)
POTASSIUM SERPL-SCNC: 4.9 MMOL/L — SIGNIFICANT CHANGE UP (ref 3.5–5.3)
PROT SERPL-MCNC: 4.6 G/DL — LOW (ref 6–8.3)
PROTHROM AB SERPL-ACNC: 11.3 SEC — SIGNIFICANT CHANGE UP (ref 9.8–13.1)
RBC # BLD: 3.13 M/UL — LOW (ref 4.2–5.8)
RBC # BLD: 3.26 M/UL — LOW (ref 4.2–5.8)
RBC # FLD: 20.7 % — HIGH (ref 10.3–14.5)
RBC # FLD: 21 % — HIGH (ref 10.3–14.5)
RH IG SCN BLD-IMP: POSITIVE — SIGNIFICANT CHANGE UP
SAO2 % BLDA: 98.8 % — SIGNIFICANT CHANGE UP (ref 95–99)
SAO2 % BLDA: 99.8 % — HIGH (ref 95–99)
SODIUM BLDA-SCNC: 129 MMOL/L — LOW (ref 136–146)
SODIUM BLDA-SCNC: 131 MMOL/L — LOW (ref 136–146)
SODIUM SERPL-SCNC: 134 MMOL/L — LOW (ref 135–145)
WBC # BLD: 36.27 K/UL — HIGH (ref 3.8–10.5)
WBC # BLD: 38.94 K/UL — HIGH (ref 3.8–10.5)
WBC # FLD AUTO: 36.27 K/UL — HIGH (ref 3.8–10.5)
WBC # FLD AUTO: 38.94 K/UL — HIGH (ref 3.8–10.5)

## 2017-12-24 PROCEDURE — 71010: CPT | Mod: 26

## 2017-12-24 PROCEDURE — 99291 CRITICAL CARE FIRST HOUR: CPT

## 2017-12-24 PROCEDURE — 99233 SBSQ HOSP IP/OBS HIGH 50: CPT | Mod: GC

## 2017-12-24 RX ORDER — MIDAZOLAM HYDROCHLORIDE 1 MG/ML
1 INJECTION, SOLUTION INTRAMUSCULAR; INTRAVENOUS
Qty: 100 | Refills: 0 | Status: DISCONTINUED | OUTPATIENT
Start: 2017-12-24 | End: 2017-12-26

## 2017-12-24 RX ORDER — NOREPINEPHRINE BITARTRATE/D5W 8 MG/250ML
0.04 PLASTIC BAG, INJECTION (ML) INTRAVENOUS
Qty: 8 | Refills: 0 | Status: DISCONTINUED | OUTPATIENT
Start: 2017-12-24 | End: 2017-12-27

## 2017-12-24 RX ORDER — FENTANYL CITRATE 50 UG/ML
4 INJECTION INTRAVENOUS
Qty: 2500 | Refills: 0 | Status: DISCONTINUED | OUTPATIENT
Start: 2017-12-24 | End: 2017-12-25

## 2017-12-24 RX ORDER — CHLORHEXIDINE GLUCONATE 213 G/1000ML
1 SOLUTION TOPICAL DAILY
Qty: 0 | Refills: 0 | Status: DISCONTINUED | OUTPATIENT
Start: 2017-12-24 | End: 2017-12-28

## 2017-12-24 RX ORDER — PANTOPRAZOLE SODIUM 20 MG/1
40 TABLET, DELAYED RELEASE ORAL DAILY
Qty: 0 | Refills: 0 | Status: DISCONTINUED | OUTPATIENT
Start: 2017-12-24 | End: 2017-12-25

## 2017-12-24 RX ORDER — NOREPINEPHRINE BITARTRATE/D5W 8 MG/250ML
0.04 PLASTIC BAG, INJECTION (ML) INTRAVENOUS
Qty: 8 | Refills: 0 | Status: DISCONTINUED | OUTPATIENT
Start: 2017-12-24 | End: 2017-12-24

## 2017-12-24 RX ORDER — CHLORHEXIDINE GLUCONATE 213 G/1000ML
15 SOLUTION TOPICAL
Qty: 0 | Refills: 0 | Status: DISCONTINUED | OUTPATIENT
Start: 2017-12-24 | End: 2017-12-25

## 2017-12-24 RX ORDER — FENTANYL CITRATE 50 UG/ML
2 INJECTION INTRAVENOUS
Qty: 2500 | Refills: 0 | Status: DISCONTINUED | OUTPATIENT
Start: 2017-12-24 | End: 2017-12-24

## 2017-12-24 RX ADMIN — Medication 1 APPLICATION(S): at 05:20

## 2017-12-24 RX ADMIN — CHLORHEXIDINE GLUCONATE 15 MILLILITER(S): 213 SOLUTION TOPICAL at 18:02

## 2017-12-24 RX ADMIN — TACROLIMUS 1 MILLIGRAM(S): 5 CAPSULE ORAL at 05:20

## 2017-12-24 RX ADMIN — CHLORHEXIDINE GLUCONATE 1 APPLICATION(S): 213 SOLUTION TOPICAL at 11:44

## 2017-12-24 RX ADMIN — SEVELAMER CARBONATE 1600 MILLIGRAM(S): 2400 POWDER, FOR SUSPENSION ORAL at 11:43

## 2017-12-24 RX ADMIN — FENTANYL CITRATE 4 MICROGRAM(S)/KG/HR: 50 INJECTION INTRAVENOUS at 16:12

## 2017-12-24 RX ADMIN — Medication 1 APPLICATION(S): at 18:02

## 2017-12-24 RX ADMIN — Medication 1: at 11:44

## 2017-12-24 RX ADMIN — HEPARIN SODIUM 5000 UNIT(S): 5000 INJECTION INTRAVENOUS; SUBCUTANEOUS at 16:10

## 2017-12-24 RX ADMIN — Medication 1: at 18:13

## 2017-12-24 RX ADMIN — Medication 6.27 MICROGRAM(S)/KG/MIN: at 08:03

## 2017-12-24 RX ADMIN — CHLORHEXIDINE GLUCONATE 15 MILLILITER(S): 213 SOLUTION TOPICAL at 05:20

## 2017-12-24 RX ADMIN — Medication 1: at 05:20

## 2017-12-24 RX ADMIN — Medication 50 MILLIGRAM(S): at 09:39

## 2017-12-24 RX ADMIN — FENTANYL CITRATE 16.72 MICROGRAM(S)/KG/HR: 50 INJECTION INTRAVENOUS at 08:03

## 2017-12-24 RX ADMIN — MIDAZOLAM HYDROCHLORIDE 3 MG/HR: 1 INJECTION, SOLUTION INTRAMUSCULAR; INTRAVENOUS at 08:05

## 2017-12-24 RX ADMIN — MIDAZOLAM HYDROCHLORIDE 1 MG/HR: 1 INJECTION, SOLUTION INTRAMUSCULAR; INTRAVENOUS at 16:11

## 2017-12-24 RX ADMIN — SEVELAMER CARBONATE 1600 MILLIGRAM(S): 2400 POWDER, FOR SUSPENSION ORAL at 18:07

## 2017-12-24 RX ADMIN — FENTANYL CITRATE 33.44 MICROGRAM(S)/KG/HR: 50 INJECTION INTRAVENOUS at 16:12

## 2017-12-24 RX ADMIN — ATORVASTATIN CALCIUM 10 MILLIGRAM(S): 80 TABLET, FILM COATED ORAL at 23:16

## 2017-12-24 RX ADMIN — HEPARIN SODIUM 5000 UNIT(S): 5000 INJECTION INTRAVENOUS; SUBCUTANEOUS at 23:16

## 2017-12-24 RX ADMIN — Medication 6.27 MICROGRAM(S)/KG/MIN: at 16:11

## 2017-12-24 RX ADMIN — HEPARIN SODIUM 5000 UNIT(S): 5000 INJECTION INTRAVENOUS; SUBCUTANEOUS at 05:20

## 2017-12-24 RX ADMIN — PROPOFOL 25.08 MICROGRAM(S)/KG/MIN: 10 INJECTION, EMULSION INTRAVENOUS at 08:03

## 2017-12-24 RX ADMIN — TACROLIMUS 1 MILLIGRAM(S): 5 CAPSULE ORAL at 18:07

## 2017-12-24 RX ADMIN — FENTANYL CITRATE 2 MICROGRAM(S)/KG/HR: 50 INJECTION INTRAVENOUS at 08:33

## 2017-12-24 RX ADMIN — Medication 50 MILLIGRAM(S): at 23:16

## 2017-12-24 NOTE — PROGRESS NOTE ADULT - SUBJECTIVE AND OBJECTIVE BOX
[facility] DIVISION OF KIDNEY DISEASES AND HYPERTENSION -- [phone]   FOLLOW UP NOTE  --------------------------------------------------------------------------------  HPI:  72-year-old male with h/o ESRD s/p kidney transplantation in 2013, CKD stage III, prostate cancer and HTN presented with complaints of SOB and cough for the past 3 days. Patient is currently in MICU for management of respiratory failure. Nephrology was consulted for elevated Scr and and management of transplant immunosuppression. [facility] had [facility] done on January 2013 at [facility]. Post transplant course was complicated by delayed graft function and perforated bladder. Patient currently follows with his outpatient nephrologist Dr. Luevano for kidney transplant management. Scr was 1.37 on labs done on 10/30/17. His outpatient transplant medications are tacrolimus 2 mg PO BID, and myfortic 360 mg PO BID.  He was intubated due to worsening respiratory status. Patient was initiated on HD on 12/21/17 due to worsening uremia and hyperkalemia. Dialyzed  12/22 and 12/23 for hyperkalemia.  Pt also with pneumomediastinum s/p bronch c/b with pneumothorax requiring chest tube placement this AM.  Patient seen and evaluated in MICU.     PAST HISTORY  --------------------------------------------------------------------------------  No significant changes to PMH, PSH, FHx, SHx, unless otherwise noted    ALLERGIES & MEDICATIONS  --------------------------------------------------------------------------------  Allergies    No Known Allergies    Intolerances      Standing Inpatient Medications  atorvastatin 10 milliGRAM(s) Oral at bedtime  chlorhexidine 0.12% Liquid 15 milliLiter(s) Swish and Spit two times a day  chlorhexidine 4% Liquid 1 Application(s) Topical daily  dextrose 5%. 1000 milliLiter(s) IV Continuous <Continuous>  dextrose 50% Injectable 12.5 Gram(s) IV Push once  dextrose 50% Injectable 25 Gram(s) IV Push once  dextrose 50% Injectable 25 Gram(s) IV Push once  fentaNYL   Infusion 2 MICROgram(s)/kG/Hr IV Continuous <Continuous>  heparin  Injectable 5000 Unit(s) SubCutaneous every 8 hours  insulin lispro (HumaLOG) corrective regimen sliding scale   SubCutaneous every 6 hours  methylPREDNISolone sodium succinate Injectable 50 milliGRAM(s) IV Push every 12 hours  midazolam Infusion 3 mG/Hr IV Continuous <Continuous>  norepinephrine Infusion 0.04 MICROgram(s)/kG/Min IV Continuous <Continuous>  petrolatum Ophthalmic Ointment 1 Application(s) Both EYES two times a day  propofol Infusion 50 MICROgram(s)/kG/Min IV Continuous <Continuous>  sevelamer carbonate Powder 1600 milliGRAM(s) Oral three times a day with meals  tacrolimus 1 milliGRAM(s) Oral every 12 hours    PRN Inpatient Medications  dextrose Gel 1 Dose(s) Oral once PRN  glucagon  Injectable 1 milliGRAM(s) IntraMuscular once PRN      REVIEW OF SYSTEMS  --------------------------------------------------------------------------------  unable to obtain    VITALS/PHYSICAL EXAM  --------------------------------------------------------------------------------  T(C): 37.2 (12-24-17 @ 04:00), Max: 37.6 (12-23-17 @ 15:00)  HR: 85 (12-24-17 @ 07:39) (79 - 122)  BP: 119/56 (12-24-17 @ 06:00) (83/46 - 127/61)  RR: 18 (12-24-17 @ 06:00) (18 - 32)  SpO2: 95% (12-24-17 @ 07:39) (90% - 97%)  Wt(kg): --        12-23-17 @ 07:01  -  12-24-17 @ 07:00  --------------------------------------------------------  IN: 1695 mL / OUT: 1130 mL / NET: 565 mL      Physical Exam:  	Gen: NAD  	HEENT: +ETT  	Pulm: CTA B/L anteriorly  	CV: S1S2; no rub  	Abd: soft, rectal tube  	: marie in place  	UE: anasarca  	LE: anasarca  	Neuro: sedated  	Skin: cool  	Vascular access: right upper extremity AVF    LABS/STUDIES  --------------------------------------------------------------------------------              7.2    36.27 >-----------<  420      [12-24-17 @ 03:06]              23.4     134  |  94  |  71  ----------------------------<  161      [12-24-17 @ 02:45]  4.9   |  26  |  2.52        Ca     7.7     [12-24-17 @ 02:45]      Mg     2.3     [12-24-17 @ 02:45]      Phos  7.3     [12-24-17 @ 02:45]    TPro  4.6  /  Alb  2.4  /  TBili  0.2  /  DBili  x   /  AST  50  /  ALT  50  /  AlkPhos  95  [12-24-17 @ 02:45]    PT/INR: PT 11.3 , INR 1.02       [12-24-17 @ 03:06]  PTT: 24.9       [12-24-17 @ 03:06]      Creatinine Trend:  SCr 2.52 [12-24 @ 02:45]  SCr 3.40 [12-23 @ 02:35]  SCr 3.00 [12-22 @ 17:12]  SCr 4.34 [12-22 @ 02:48]  SCr 5.75 [12-21 @ 03:40]    Urinalysis - [12-18-17 @ 09:35]      Color YELLOW / Appearance HAZY / SG 1.022 / pH 5.5      Gluc NEGATIVE / Ketone NEGATIVE  / Bili NEGATIVE / Urobili NORMAL       Blood MODERATE / Protein 100 / Leuk Est LARGE / Nitrite NEGATIVE      RBC >50 / WBC >50 / Hyaline  / Gran  / Sq Epi OCC / Non Sq Epi  / Bacteria     Urine Creatinine 194.64      [12-18-17 @ 09:35]  Urine Sodium 27      [12-18-17 @ 09:35]  Urine Urea Nitrogen 310.7      [12-18-17 @ 09:35]      HBsAb 61.6      [12-21-17 @ 17:10]  HBsAg NEGATIVE      [12-21-17 @ 17:10]  HBcAb Nonreactive      [12-21-17 @ 17:10]  HCV 0.06, Nonreactive Hepatitis C AB  S/CO Ratio                        Interpretation  < 1.0                                     Non-Reactive  1.0 - 4.9                           Weakly-Reactive  > 5.0                                 Reactive  Non-Reactive: Aperson with a non-reactive HCV antibody  result is considered uninfected.  No further action is  needed unless recent infection is suspected.  In these  cases, consider repeat testing later to detect  seroconversion..  Weakly-Reactive: HCV antibody test is abnormal, HCV RNA  Qualitative test will follow.  Reactive: HCV antibody test is abnormal, HCV RNA  Qualitative test will follow.  Note: HCV antibody testing is performed on the [employer] system.      [12-21-17 @ 17:10]    ANCA: cANCA Negative, pANCA Negative, atypical ANCA --      [12-20-17 @ 05:00] [facility] DIVISION OF KIDNEY DISEASES AND HYPERTENSION -- [phone]   FOLLOW UP NOTE  --------------------------------------------------------------------------------  HPI:  72-year-old male with h/o ESRD s/p kidney transplantation in 2013, CKD stage III, prostate cancer and HTN presented with complaints of SOB and cough for the past 3 days. Patient is currently in MICU for management of respiratory failure. Nephrology was consulted for elevated Scr and and management of transplant immunosuppression. [facility] [place] patient had DD[facility] done on January 2013 at [facility]. Post transplant course was complicated by delayed graft function and perforated bladder. Patient currently follows with his outpatient nephrologist Dr. Luevano for kidney transplant management. Scr was 1.37 on labs done on 10/30/17. His outpatient transplant medications are tacrolimus 2 mg PO BID, and myfortic 360 mg PO BID.  He was intubated due to worsening respiratory status. Patient was initiated on HD on 12/21/17 due to worsening uremia and hyperkalemia. Dialyzed  12/22 and 12/23 for hyperkalemia.  Pt also with pneumomediastinum s/p bronch c/b with pneumothorax requiring chest tube placement this AM.  Patient seen and evaluated in MICU.     PAST HISTORY  --------------------------------------------------------------------------------  No significant changes to PMH, PSH, FHx, SHx, unless otherwise noted    ALLERGIES & MEDICATIONS  --------------------------------------------------------------------------------  Allergies    No Known Allergies    Intolerances      Standing Inpatient Medications  atorvastatin 10 milliGRAM(s) Oral at bedtime  chlorhexidine 0.12% Liquid 15 milliLiter(s) Swish and Spit two times a day  chlorhexidine 4% Liquid 1 Application(s) Topical daily  dextrose 5%. 1000 milliLiter(s) IV Continuous <Continuous>  dextrose 50% Injectable 12.5 Gram(s) IV Push once  dextrose 50% Injectable 25 Gram(s) IV Push once  dextrose 50% Injectable 25 Gram(s) IV Push once  fentaNYL   Infusion 2 MICROgram(s)/kG/Hr IV Continuous <Continuous>  heparin  Injectable 5000 Unit(s) SubCutaneous every 8 hours  insulin lispro (HumaLOG) corrective regimen sliding scale   SubCutaneous every 6 hours  methylPREDNISolone sodium succinate Injectable 50 milliGRAM(s) IV Push every 12 hours  midazolam Infusion 3 mG/Hr IV Continuous <Continuous>  norepinephrine Infusion 0.04 MICROgram(s)/kG/Min IV Continuous <Continuous>  petrolatum Ophthalmic Ointment 1 Application(s) Both EYES two times a day  propofol Infusion 50 MICROgram(s)/kG/Min IV Continuous <Continuous>  sevelamer carbonate Powder 1600 milliGRAM(s) Oral three times a day with meals  tacrolimus 1 milliGRAM(s) Oral every 12 hours    PRN Inpatient Medications  dextrose Gel 1 Dose(s) Oral once PRN  glucagon  Injectable 1 milliGRAM(s) IntraMuscular once PRN      REVIEW OF SYSTEMS  --------------------------------------------------------------------------------  unable to obtain    VITALS/PHYSICAL EXAM  --------------------------------------------------------------------------------  T(C): 37.2 (12-24-17 @ 04:00), Max: 37.6 (12-23-17 @ 15:00)  HR: 85 (12-24-17 @ 07:39) (79 - 122)  BP: 119/56 (12-24-17 @ 06:00) (83/46 - 127/61)  RR: 18 (12-24-17 @ 06:00) (18 - 32)  SpO2: 95% (12-24-17 @ 07:39) (90% - 97%)  Wt(kg): --        12-23-17 @ 07:01  -  12-24-17 @ 07:00  --------------------------------------------------------  IN: 1695 mL / OUT: 1130 mL / NET: 565 mL      Physical Exam:  	Gen: NAD  	HEENT: +ETT  	Pulm: CTA B/L anteriorly crepitations  	CV: S1S2; no rub  	Abd: soft, rectal tube  	: marie in place  	UE: anasarca  	LE: anasarca  	Neuro: sedated  	Skin: cool  	Vascular access: right upper extremity AVF    LABS/STUDIES  --------------------------------------------------------------------------------              7.2    36.27 >-----------<  420      [12-24-17 @ 03:06]              23.4     134  |  94  |  71  ----------------------------<  161      [12-24-17 @ 02:45]  4.9   |  26  |  2.52        Ca     7.7     [12-24-17 @ 02:45]      Mg     2.3     [12-24-17 @ 02:45]      Phos  7.3     [12-24-17 @ 02:45]    TPro  4.6  /  Alb  2.4  /  TBili  0.2  /  DBili  x   /  AST  50  /  ALT  50  /  AlkPhos  95  [12-24-17 @ 02:45]    PT/INR: PT 11.3 , INR 1.02       [12-24-17 @ 03:06]  PTT: 24.9       [12-24-17 @ 03:06]      Creatinine Trend:  SCr 2.52 [12-24 @ 02:45]  SCr 3.40 [12-23 @ 02:35]  SCr 3.00 [12-22 @ 17:12]  SCr 4.34 [12-22 @ 02:48]  SCr 5.75 [12-21 @ 03:40]    Urinalysis - [12-18-17 @ 09:35]      Color YELLOW / Appearance HAZY / SG 1.022 / pH 5.5      Gluc NEGATIVE / Ketone NEGATIVE  / Bili NEGATIVE / Urobili NORMAL       Blood MODERATE / Protein 100 / Leuk Est LARGE / Nitrite NEGATIVE      RBC >50 / WBC >50 / Hyaline  / Gran  / Sq Epi OCC / Non Sq Epi  / Bacteria     Urine Creatinine 194.64      [12-18-17 @ 09:35]  Urine Sodium 27      [12-18-17 @ 09:35]  Urine Urea Nitrogen 310.7      [12-18-17 @ 09:35]      HBsAb 61.6      [12-21-17 @ 17:10]  HBsAg NEGATIVE      [12-21-17 @ 17:10]  HBcAb Nonreactive      [12-21-17 @ 17:10]  HCV 0.06, Nonreactive Hepatitis C AB  S/CO Ratio                        Interpretation  < 1.0                                     Non-Reactive  1.0 - 4.9                           Weakly-Reactive  > 5.0                                 Reactive  Non-Reactive: Aperson with a non-reactive HCV antibody  result is considered uninfected.  No further action is  needed unless recent infection is suspected.  In these  cases, consider repeat testing later to detect  seroconversion..  Weakly-Reactive: HCV antibody test is abnormal, HCV RNA  Qualitative test will follow.  Reactive: HCV antibody test is abnormal, HCV RNA  Qualitative test will follow.  Note: HCV antibody testing is performed on the [employer] system.      [12-21-17 @ 17:10]    ANCA: cANCA Negative, pANCA Negative, atypical ANCA --      [12-20-17 @ 05:00]

## 2017-12-24 NOTE — PROGRESS NOTE ADULT - SUBJECTIVE AND OBJECTIVE BOX
Interval events:     Review of Systems:  Limited by sedation and clinical status    T(F): 98.9 (12-24-17 @ 04:00), Max: 99.6 (12-23-17 @ 15:00)  HR: 85 (12-24-17 @ 06:00) (79 - 122)  BP: 119/56 (12-24-17 @ 06:00) (83/46 - 127/61)  RR: 18 (12-24-17 @ 06:00) (18 - 32)  SpO2: 95% (12-24-17 @ 06:00) (90% - 97%)  Wt(kg): --    Mode: AC/ CMV (Assist Control/ Continuous Mandatory Ventilation), RR (machine): 18, TV (machine): 450, FiO2: 60, PEEP: 5    CAPILLARY BLOOD GLUCOSE      POCT Blood Glucose.: 182 mg/dL (24 Dec 2017 05:18)    I&O's Summary    23 Dec 2017 07:01  -  24 Dec 2017 07:00  --------------------------------------------------------  IN: 1695 mL / OUT: 1130 mL / NET: 565 mL        Physical Exam:     Gen: Intubated, sedated. Chest tube in place  Neuro: Unable to evaluate in clinical status. Sedated  HEENT: Crepitus at neck, EOMI, PERRL  CV: RRR, no AFib at the moment  Pulm: intubated, not triggering vent  GI: Diarrhea with rectal tube in place. On OG tube feeds  Skin: No rash or breakdown  Meds:  heparin  Injectable 5000 Unit(s) SubCutaneous every 8 hours      norepinephrine Infusion 0.04 MICROgram(s)/kG/Min IV Continuous <Continuous>    atorvastatin 10 milliGRAM(s) Oral at bedtime  dextrose 50% Injectable 12.5 Gram(s) IV Push once  dextrose 50% Injectable 25 Gram(s) IV Push once  dextrose 50% Injectable 25 Gram(s) IV Push once  dextrose Gel 1 Dose(s) Oral once PRN  glucagon  Injectable 1 milliGRAM(s) IntraMuscular once PRN  insulin lispro (HumaLOG) corrective regimen sliding scale   SubCutaneous every 6 hours  methylPREDNISolone sodium succinate Injectable 50 milliGRAM(s) IV Push every 12 hours      fentaNYL   Infusion 2 MICROgram(s)/kG/Hr IV Continuous <Continuous>  midazolam Infusion 3 mG/Hr IV Continuous <Continuous>  propofol Infusion 50 MICROgram(s)/kG/Min IV Continuous <Continuous>          dextrose 5%. 1000 milliLiter(s) IV Continuous <Continuous>    tacrolimus 1 milliGRAM(s) Oral every 12 hours    chlorhexidine 0.12% Liquid 15 milliLiter(s) Swish and Spit two times a day  chlorhexidine 4% Liquid 1 Application(s) Topical daily  petrolatum Ophthalmic Ointment 1 Application(s) Both EYES two times a day    sevelamer carbonate Powder 1600 milliGRAM(s) Oral three times a day with meals                                7.2    36.27 )-----------( 420      ( 24 Dec 2017 03:06 )             23.4       12-24    134<L>  |  94<L>  |  71<H>  ----------------------------<  161<H>  4.9   |  26  |  2.52<H>    Ca    7.7<L>      24 Dec 2017 02:45  Phos  7.3     12-24  Mg     2.3     12-24    TPro  4.6<L>  /  Alb  2.4<L>  /  TBili  0.2  /  DBili  x   /  AST  50<H>  /  ALT  50<H>  /  AlkPhos  95  12-24          PT/INR - ( 24 Dec 2017 03:06 )   PT: 11.3 SEC;   INR: 1.02          PTT - ( 24 Dec 2017 03:06 )  PTT:24.9 SEC          ABG - ( 24 Dec 2017 05:23 )  pH: 7.36  /  pCO2: 46    /  pO2: 148   / HCO3: 25    / Base Excess: 0.5   /  SaO2: 98.8

## 2017-12-24 NOTE — PROGRESS NOTE ADULT - PROBLEM SELECTOR PLAN 1
PATRICIA in setting of critical illness and elevated tacrolimus dose.  Pt received third session of HD yesterday with 450cc fluid removal. Pt remains  volume overloaded. Serum potassium improved to 4.9  today. Will plan for another  session of HD today with 500 cc UF. Monitor Scr and urine output.  Recommend strict I+O PATRICIA in setting of critical illness and elevated tacrolimus dose.  Pt received third session of HD yesterday with 450cc fluid removal. Pt remains  volume overloaded. Serum potassium improved to 4.9  today. No plan for HD today. Monitor Scr and urine output.  Recommend strict I+O PATRICIA in setting of critical illness and elevated tacrolimus dose.  Pt received third session of HD yesterday with 450cc fluid removal. Pt remains  volume overloaded. Serum potassium improved to 4.9  today. Will plan for HD with UF today. Monitor Scr and urine output.  Recommend strict I+O

## 2017-12-24 NOTE — PROGRESS NOTE ADULT - ASSESSMENT
72M PMH Prostate cancer (per Heme/Onc, dx 2006, surgery 2007, on Zoladex until 2015, followed by casodex, Xtandi, Zytiga, and now on taxotere with good PSA response. 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 Last chemo December 1st), ESRD s/p renal transplant w/CKDIII (2013, on Mycophenolate and tacrolimus, baseline Cr 1.2 – 1.5), HTN, who presents with 3 weeks of cough and 2 days of rapidly increasing SOB, found to be septic likely due to taxotere induced lung injury    # Neuro: Sedated, intubated. Nimbex paralysis discontinued 12/22, will consider weaning sedation tomorrow    # Pulm: Intubated 12/17 for likely ARDS, all presumed infectious etiologies ruled out at this time.  - urine legionella, BAL, transbronchial bx, bcx, sputum all negative  - concern remains for pneumonitis, had been on taxotere since Aug with 3 weeks on and 1 week off cycles, last dose Dec 1st. Taxotere has been associated w/ ARDS and ILD.   - Abx discontinued 12/20 for negative infectious work-up  - started solu-medrol IV 50mg BID on 12/18 for suspected ILD inflammatory response.   - Pneumomediastinum s/p bronch. Now with pneumothorax requiring chest tube placement this AM    # Card: Last TTE in 2011 with EF 70% and normal LV/RV function. Possible CHF in setting of SOB, LE swelling, and elevated pro-BNP. Patient had episode of new AFib w/RVR requiring cardizem gtt and pressors on 12/22 which discontinued. No anticoagulation at this time, patient had chest tube placed this AM, he has had no further episodes of AFib. CHADSVASC score of 1. Will consider anticoagulation going forward  - Strict I/O's, daily weights.  - Patient may be volume down in setting of rising PATRICIA s/p diuresis and no IVF (feeds over only past few days), however would like to keep dry in setting of ARDS  - Holding home antihypertensives, on pressors  - Continue with atorvastatin 10 mg    # GI - No issues. OG tube inplace with feeds.     # Renal: S/p transplant in 2013 on immunosuppression with mycophenolate and tacrolimus. Baseline Cr 1.2-1.5. Cr 2.23 on admission, now continuing to rise. Will likely need HD today. PATRICIA may be in setting of tacrolimus in sepsis. Patient may also have pre-renal component in setting of sepsis/hypotension, particularly as Cr demonstrated some recovery on admission, followed by 80 IV lasix and no IVF since patient's Cr has steadily risen. Last FENa .3 and FEUrea 6% on 12/18. Complicated by the fact that patient must be kept dry for ARDS. Patient required HD the past 3 days for hyperkalemia, currently with K 4.9  - Urinalysis, urine electrolytes, monitor UOP  - Daily Tacro levels, must be drawn 30 minutes before a dose.  - HD the past 3 days for hyperkalemia  - Parry in place    # ID: Infectious work-up negative to date. S/p 5 days Vanc/Zosyn. S/p Azithro and 2 days of bactrim for suspected PCP. CMV negative, BK virus negative  - monitor off abx    # Endocrine: Now on solumedrol, monitoring FS q6h with sliding scale    # DVT ppx w/ HSQ      For changes in clinical status or to get in touch with family, call Vin Usman (Brother) cell - [phone]

## 2017-12-24 NOTE — PROGRESS NOTE ADULT - PROBLEM SELECTOR PLAN 2
Patient with kidney transplantation (DDRT) in . C/w tacrolimus 1 mg BID.  goal tacro level 4 to 5 in setting of possible infection and hemodynamic PATRICIA.  Last FK is 3.3 on .   Monitor trough.  Myfortic on hold as patient with possible sepsis / critical illness.  Currently on solumedrol for possible pneumonitis.  Monitor tacrolimus level.  Check new medications for interactions through cy pathway

## 2017-12-24 NOTE — PROGRESS NOTE ADULT - PROBLEM SELECTOR PLAN 3
Serum phosphorus noted to be elevated.  Currently on sevelamer 1600 mg TID. Monitor serum phosphorus levels

## 2017-12-25 LAB
ALBUMIN SERPL ELPH-MCNC: 2.6 G/DL — LOW (ref 3.3–5)
ALP SERPL-CCNC: 98 U/L — SIGNIFICANT CHANGE UP (ref 40–120)
ALT FLD-CCNC: 46 U/L — HIGH (ref 4–41)
AST SERPL-CCNC: 39 U/L — SIGNIFICANT CHANGE UP (ref 4–40)
BASOPHILS # BLD AUTO: 0.06 K/UL — SIGNIFICANT CHANGE UP (ref 0–0.2)
BASOPHILS NFR BLD AUTO: 0.2 % — SIGNIFICANT CHANGE UP (ref 0–2)
BILIRUB SERPL-MCNC: 0.2 MG/DL — SIGNIFICANT CHANGE UP (ref 0.2–1.2)
BUN SERPL-MCNC: 57 MG/DL — HIGH (ref 7–23)
CALCIUM SERPL-MCNC: 7.9 MG/DL — LOW (ref 8.4–10.5)
CHLORIDE SERPL-SCNC: 98 MMOL/L — SIGNIFICANT CHANGE UP (ref 98–107)
CO2 SERPL-SCNC: 27 MMOL/L — SIGNIFICANT CHANGE UP (ref 22–31)
CREAT SERPL-MCNC: 2.32 MG/DL — HIGH (ref 0.5–1.3)
EOSINOPHIL # BLD AUTO: 0.26 K/UL — SIGNIFICANT CHANGE UP (ref 0–0.5)
EOSINOPHIL NFR BLD AUTO: 0.7 % — SIGNIFICANT CHANGE UP (ref 0–6)
GLUCOSE BLDC GLUCOMTR-MCNC: 158 MG/DL — HIGH (ref 70–99)
GLUCOSE BLDC GLUCOMTR-MCNC: 176 MG/DL — HIGH (ref 70–99)
GLUCOSE BLDC GLUCOMTR-MCNC: 189 MG/DL — HIGH (ref 70–99)
GLUCOSE BLDC GLUCOMTR-MCNC: 190 MG/DL — HIGH (ref 70–99)
GLUCOSE SERPL-MCNC: 154 MG/DL — HIGH (ref 70–99)
HCT VFR BLD CALC: 23.6 % — LOW (ref 39–50)
HGB BLD-MCNC: 6.8 G/DL — CRITICAL LOW (ref 13–17)
IMM GRANULOCYTES # BLD AUTO: 1.87 # — SIGNIFICANT CHANGE UP
IMM GRANULOCYTES NFR BLD AUTO: 4.7 % — HIGH (ref 0–1.5)
LYMPHOCYTES # BLD AUTO: 0.66 K/UL — LOW (ref 1–3.3)
LYMPHOCYTES # BLD AUTO: 1.7 % — LOW (ref 13–44)
MAGNESIUM SERPL-MCNC: 2.3 MG/DL — SIGNIFICANT CHANGE UP (ref 1.6–2.6)
MCHC RBC-ENTMCNC: 21.5 PG — LOW (ref 27–34)
MCHC RBC-ENTMCNC: 28.8 % — LOW (ref 32–36)
MCV RBC AUTO: 74.4 FL — LOW (ref 80–100)
MONOCYTES # BLD AUTO: 1.67 K/UL — HIGH (ref 0–0.9)
MONOCYTES NFR BLD AUTO: 4.2 % — SIGNIFICANT CHANGE UP (ref 2–14)
NEUTROPHILS # BLD AUTO: 35.12 K/UL — HIGH (ref 1.8–7.4)
NEUTROPHILS NFR BLD AUTO: 88.5 % — HIGH (ref 43–77)
NRBC # FLD: 0.63 — SIGNIFICANT CHANGE UP
NRBC FLD-RTO: 1.6 — SIGNIFICANT CHANGE UP
PHOSPHATE SERPL-MCNC: 6.4 MG/DL — HIGH (ref 2.5–4.5)
PLATELET # BLD AUTO: 513 K/UL — HIGH (ref 150–400)
PMV BLD: 10.2 FL — SIGNIFICANT CHANGE UP (ref 7–13)
POTASSIUM SERPL-MCNC: 4.5 MMOL/L — SIGNIFICANT CHANGE UP (ref 3.5–5.3)
POTASSIUM SERPL-SCNC: 4.5 MMOL/L — SIGNIFICANT CHANGE UP (ref 3.5–5.3)
PREALB SERPL-MCNC: 21 MG/DL — SIGNIFICANT CHANGE UP (ref 20–40)
PROT SERPL-MCNC: 4.8 G/DL — LOW (ref 6–8.3)
RBC # BLD: 3.17 M/UL — LOW (ref 4.2–5.8)
RBC # FLD: 21.3 % — HIGH (ref 10.3–14.5)
SODIUM SERPL-SCNC: 140 MMOL/L — SIGNIFICANT CHANGE UP (ref 135–145)
WBC # BLD: 39.64 K/UL — HIGH (ref 3.8–10.5)
WBC # FLD AUTO: 39.64 K/UL — HIGH (ref 3.8–10.5)

## 2017-12-25 PROCEDURE — 99291 CRITICAL CARE FIRST HOUR: CPT

## 2017-12-25 PROCEDURE — 99233 SBSQ HOSP IP/OBS HIGH 50: CPT | Mod: GC

## 2017-12-25 RX ORDER — PANTOPRAZOLE SODIUM 20 MG/1
40 TABLET, DELAYED RELEASE ORAL
Qty: 0 | Refills: 0 | Status: DISCONTINUED | OUTPATIENT
Start: 2017-12-25 | End: 2017-12-26

## 2017-12-25 RX ORDER — CHLORHEXIDINE GLUCONATE 213 G/1000ML
15 SOLUTION TOPICAL
Qty: 0 | Refills: 0 | Status: DISCONTINUED | OUTPATIENT
Start: 2017-12-25 | End: 2017-12-28

## 2017-12-25 RX ORDER — FENTANYL CITRATE 50 UG/ML
2 INJECTION INTRAVENOUS
Qty: 2500 | Refills: 0 | Status: DISCONTINUED | OUTPATIENT
Start: 2017-12-25 | End: 2017-12-28

## 2017-12-25 RX ADMIN — SEVELAMER CARBONATE 1600 MILLIGRAM(S): 2400 POWDER, FOR SUSPENSION ORAL at 07:39

## 2017-12-25 RX ADMIN — Medication 1: at 23:49

## 2017-12-25 RX ADMIN — CHLORHEXIDINE GLUCONATE 1 APPLICATION(S): 213 SOLUTION TOPICAL at 11:53

## 2017-12-25 RX ADMIN — Medication 6.27 MICROGRAM(S)/KG/MIN: at 17:36

## 2017-12-25 RX ADMIN — CHLORHEXIDINE GLUCONATE 15 MILLILITER(S): 213 SOLUTION TOPICAL at 17:34

## 2017-12-25 RX ADMIN — SEVELAMER CARBONATE 1600 MILLIGRAM(S): 2400 POWDER, FOR SUSPENSION ORAL at 17:34

## 2017-12-25 RX ADMIN — Medication 1 APPLICATION(S): at 05:28

## 2017-12-25 RX ADMIN — Medication 1: at 11:52

## 2017-12-25 RX ADMIN — FENTANYL CITRATE 33.44 MICROGRAM(S)/KG/HR: 50 INJECTION INTRAVENOUS at 17:36

## 2017-12-25 RX ADMIN — Medication 6.27 MICROGRAM(S)/KG/MIN: at 11:54

## 2017-12-25 RX ADMIN — Medication 1: at 17:38

## 2017-12-25 RX ADMIN — PANTOPRAZOLE SODIUM 40 MILLIGRAM(S): 20 TABLET, DELAYED RELEASE ORAL at 17:36

## 2017-12-25 RX ADMIN — SEVELAMER CARBONATE 1600 MILLIGRAM(S): 2400 POWDER, FOR SUSPENSION ORAL at 11:53

## 2017-12-25 RX ADMIN — CHLORHEXIDINE GLUCONATE 15 MILLILITER(S): 213 SOLUTION TOPICAL at 05:27

## 2017-12-25 RX ADMIN — Medication 50 MILLIGRAM(S): at 22:32

## 2017-12-25 RX ADMIN — TACROLIMUS 1 MILLIGRAM(S): 5 CAPSULE ORAL at 05:28

## 2017-12-25 RX ADMIN — Medication 50 MILLIGRAM(S): at 10:57

## 2017-12-25 RX ADMIN — TACROLIMUS 1 MILLIGRAM(S): 5 CAPSULE ORAL at 17:34

## 2017-12-25 RX ADMIN — PROPOFOL 25.08 MICROGRAM(S)/KG/MIN: 10 INJECTION, EMULSION INTRAVENOUS at 11:53

## 2017-12-25 RX ADMIN — Medication 1: at 05:27

## 2017-12-25 RX ADMIN — Medication 1 APPLICATION(S): at 17:34

## 2017-12-25 RX ADMIN — HEPARIN SODIUM 5000 UNIT(S): 5000 INJECTION INTRAVENOUS; SUBCUTANEOUS at 05:27

## 2017-12-25 NOTE — PROGRESS NOTE ADULT - ASSESSMENT
72-year-old male with h/o kidney transplantation (DDRT in 2013), prostate cancer, HTN, and HLD found to have PATRICIA in setting of ARDS and critical illness.

## 2017-12-25 NOTE — PROGRESS NOTE ADULT - ASSESSMENT
72M PMH Prostate cancer (per Heme/Onc, dx 2006, surgery 2007, on Zoladex until 2015, followed by casodex, Xtandi, Zytiga, and now on taxotere with good PSA response. 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 Last chemo December 1st), ESRD s/p renal transplant w/CKDIII (2013, on Mycophenolate and tacrolimus, baseline Cr 1.2 – 1.5), HTN, who presents with 3 weeks of cough and 2 days of rapidly increasing SOB, found to be septic likely due to taxotere induced lung injury    # Neuro: Sedated, intubated. Nimbex paralysis discontinued 12/22, will consider weaning sedation tomorrow    # Pulm: Intubated 12/17 for likely ARDS, all presumed infectious etiologies ruled out at this time.  - urine legionella, BAL, transbronchial bx, bcx, sputum all negative  - concern remains for pneumonitis, had been on taxotere since Aug with 3 weeks on and 1 week off cycles, last dose Dec 1st. Taxotere has been associated w/ ARDS and ILD.   - Abx discontinued 12/20 for negative infectious work-up  - started solu-medrol IV 50mg BID on 12/18 for suspected ILD inflammatory response.   - Pneumomediastinum s/p bronch. Now with pneumothorax requiring chest tube placement this AM    # Card: Last TTE in 2011 with EF 70% and normal LV/RV function. Possible CHF in setting of SOB, LE swelling, and elevated pro-BNP. Patient had episode of new AFib w/RVR requiring cardizem gtt and pressors on 12/22 which discontinued. No anticoagulation at this time, patient had chest tube placed this AM, he has had no further episodes of AFib. CHADSVASC score of 1. Will consider anticoagulation going forward  - Strict I/O's, daily weights.  - Patient may be volume down in setting of rising PATRICIA s/p diuresis and no IVF (feeds over only past few days), however would like to keep dry in setting of ARDS  - Holding home antihypertensives, on pressors  - Continue with atorvastatin 10 mg    # GI - No issues. OG tube inplace with feeds.     # Renal: S/p transplant in 2013 on immunosuppression with mycophenolate and tacrolimus. Baseline Cr 1.2-1.5. Cr 2.23 on admission, now continuing to rise. Will likely need HD today. PATRICIA may be in setting of tacrolimus in sepsis. Patient may also have pre-renal component in setting of sepsis/hypotension, particularly as Cr demonstrated some recovery on admission, followed by 80 IV lasix and no IVF since patient's Cr has steadily risen. Last FENa .3 and FEUrea 6% on 12/18. Complicated by the fact that patient must be kept dry for ARDS. Patient required HD the past 3 days for hyperkalemia, currently with K 4.9  - Urinalysis, urine electrolytes, monitor UOP  - Daily Tacro levels, must be drawn 30 minutes before a dose.  - HD the past 3 days for hyperkalemia  - Parry in place    # ID: Infectious work-up negative to date. S/p 5 days Vanc/Zosyn. S/p Azithro and 2 days of bactrim for suspected PCP. CMV negative, BK virus negative  - monitor off abx    # Endocrine: Now on solumedrol, monitoring FS q6h with sliding scale    # DVT ppx w/ HSQ      For changes in clinical status or to get in touch with family, call Vin Usman (Brother) cell - [phone] Patient is a 73 y/o M w/ a PMHx of Prostate cancer (per Heme/Onc, Dx 2006, surgery 2007, on Zoladex until 2015, followed by Casodex, Xtandi, Zytiga, and now on Taxotere with good PSA response. 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 Last chemo December 1st), ESRD s/p renal transplant w/CKDIII (2013, on Mycophenolate and tacrolimus, baseline Cr 1.2 – 1.5), HTN, who presents with 3 weeks of cough and 2 days of rapidly increasing SOB, found to be septic likely due to taxotere induced lung injury    # Neuro: Sedated, intubated. Nimbex paralysis discontinued 12/22. Sedation discontinued this AM. Will closely monitor patient for improvement in mental status.    # Pulm: Intubated 12/17 for likely ARDS, all presumed infectious etiologies ruled out at this time.  - urine legionella, BAL, transbronchial bx, bcx, sputum all negative  - concern remains for pneumonitis, had been on taxotere since Aug with 3 weeks on and 1 week off cycles, last dose Dec 1st. Taxotere has been associated w/ ARDS and ILD.   - Abx discontinued 12/20 for negative infectious work-up  - started solu-medrol IV 50mg BID on 12/18 for suspected ILD inflammatory response.   - Pneumomediastinum s/p bronch. Now with pneumothorax requiring chest tube placement this AM    # Card: Last TTE in 2011 with EF 70% and normal LV/RV function. Possible CHF in setting of SOB, LE swelling, and elevated pro-BNP. Patient had episode of new AFib w/RVR requiring cardizem gtt and pressors on 12/22 which discontinued. No anticoagulation at this time, patient had chest tube placed this AM, he has had no further episodes of AFib. CHADSVASC score of 1. Will consider anticoagulation going forward  - Strict I/O's, daily weights.  - Patient may be volume down in setting of rising PATRICAI s/p diuresis and no IVF (feeds over only past few days), however would like to keep dry in setting of ARDS  - Holding home antihypertensives, on pressors  - Continue with atorvastatin 10 mg    # GI - No issues. OG tube inplace with feeds.     # Renal: S/p transplant in 2013 on immunosuppression with mycophenolate and tacrolimus. Baseline Cr 1.2-1.5. Cr 2.23 on admission, now continuing to rise. Will likely need HD today. PATRICIA may be in setting of tacrolimus in sepsis. Patient may also have pre-renal component in setting of sepsis/hypotension, particularly as Cr demonstrated some recovery on admission, followed by 80 IV lasix and no IVF since patient's Cr has steadily risen. Last FENa .3 and FEUrea 6% on 12/18. Complicated by the fact that patient must be kept dry for ARDS. Patient required HD the past 3 days for hyperkalemia, currently with K 4.9  - Urinalysis, urine electrolytes, monitor UOP  - Daily Tacro levels, must be drawn 30 minutes before a dose.  - HD the past 3 days for hyperkalemia  - Parry in place    # ID: Infectious work-up negative to date. S/p 5 days Vanc/Zosyn. S/p Azithro and 2 days of bactrim for suspected PCP. CMV negative, BK virus negative  - monitor off abx    # Endocrine: Now on solumedrol, monitoring FS q6h with sliding scale    # DVT ppx w/ HSQ      For changes in clinical status or to get in touch with family, call Vin Simsav (Brother) cell - [phone] Patient is a 73 y/o M w/ a PMHx of Prostate cancer (per Heme/Onc, Dx 2006, surgery 2007, on Zoladex until 2015, followed by Casodex, Xtandi, Zytiga, and now on Taxotere with good PSA response. 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 Last chemo December 1st), ESRD s/p renal transplant w/CKDIII (2013, on Mycophenolate and tacrolimus, baseline Cr 1.2 – 1.5), HTN, who presents with 3 weeks of cough and 2 days of rapidly increasing SOB, found to be septic likely due to taxotere induced lung injury    # Neuro: Sedated, intubated. Nimbex paralysis discontinued 12/22. Sedation discontinued this AM. Will closely monitor patient for improvement in mental status.    # Pulm: Intubated 12/17 for likely ARDS, all presumed infectious etiologies ruled out at this time.  - urine legionella, BAL, transbronchial bx, bcx, sputum all negative  - concern remains for pneumonitis, had been on taxotere since Aug with 3 weeks on and 1 week off cycles, last dose Dec 1st. Taxotere has been associated w/ ARDS and ILD.   - Abx discontinued 12/20 for negative infectious work-up  - C/w IV Solu-medrol 50mg BID (started on 12/18) for suspected ILD inflammatory response.   - Pneumomediastinum s/p bronch. S/p R chest tube placement on 12/23 for pneumothorax    # Card: Last TTE in 2011 with EF 70% and normal LV/RV function. Possible CHF in setting of SOB, LE swelling, and elevated pro-BNP. Patient had episode of new AFib w/RVR requiring cardizem gtt and pressors on 12/22 which discontinued. No anticoagulation at this time. CHADSVASC score of 1. Will consider anticoagulation going forward  - Strict I/O's, daily weights.  - Holding home antihypertensives for now. Will restart as appropriate    # GI - Patient with a very dark ? greenish output from rectal tube. Given downtrending Hgb, will check an FOBT. OGT currently in place with feeds. Mild transaminitis is improving.    # Renal: S/p transplant in 2013 on immunosuppression with mycophenolate and tacrolimus. Baseline Cr 1.2-1.5. PATRICIA during hopsitalization (Cr peaked at 5.75), Cr is now downtrending (Cr = 2.32 today). Patient had required HD earlier in hospital course for hyperkalemia (K+ = 4.5 this AM).  - Renal following, appreciate recs, No plan for HD today  - Urinalysis, urine electrolytes, monitor UOP  - Daily Tacro levels, must be drawn 30 minutes before a dose.  - Parry in place    # ID: Infectious work-up negative to date. S/p 5 days Vanc/Zosyn. S/p Azithro and 2 days of bactrim for suspected PCP. CMV negative, BK virus negative.  - Continue to monitor off abx    # Heme: Patient's Hgb noted to be downtrending since admission. Hgb decreased to 6.8 today. Unclear if anemia is 2/2 darkish output from rectal tube vs prior chemotherapy? Will check an FOBT. Patient with an active T+S. There is no overt signs of bleeding at this time. Will d/w patient's brother today regarding consent for a blood transfusion.     # Endocrine: Now on IV solumedrol. Continue to monitor FS Q6H w/ HISS.    # DVT ppx: HSQ discontinued in the setting of worsening anemia. Will start SCDs.      For changes in clinical status or to get in touch with family, call Vin Mary (Brother) cell - [phone]

## 2017-12-25 NOTE — PROGRESS NOTE ADULT - SUBJECTIVE AND OBJECTIVE BOX
[facility] Division of Kidney Diseases & Hypertension  FOLLOW UP NOTE  --------------------------------------------------------------------------------  HPI: 72-year-old male with h/o ESRD s/p kidney transplantation in 2013, CKD stage III, prostate cancer and HTN presented with complaints of SOB and cough for the past 3 days. Patient is currently in MICU for management of respiratory failure. Nephrology was consulted for elevated Scr and management of transplant immunosuppression. On review of previous records in [facility], patient had DDRT done on January 2013 at [facility]. Post transplant course was complicated by delayed graft function and perforated bladder. Patient currently follows with his outpatient nephrologist Dr. Luevano for kidney transplant management. Scr was 1.37 on labs done on 10/30/17. His outpatient transplant medications are tacrolimus 2 mg PO BID, and myfortic 360 mg PO BID.  He was intubated due to worsening respiratory status. Patient was initiated on HD on 12/21/17 due to worsening uremia and hyperkalemia. Dialyzed again 12/22 for hyperkalemia.  Patient had rapid heart rate requiring cardizem during HD 12/23.  Patient had HD with UF on 12/24.  Today patient seen and evaluated in MICU now with 50% FIO2 requirement and gross anasarca.        PAST HISTORY  --------------------------------------------------------------------------------  No significant changes to PMH, PSH, FHx, SHx, unless otherwise noted    ALLERGIES & MEDICATIONS  --------------------------------------------------------------------------------  Allergies    No Known Allergies    Intolerances      Standing Inpatient Medications  atorvastatin 10 milliGRAM(s) Oral at bedtime  chlorhexidine 0.12% Liquid 15 milliLiter(s) Swish and Spit two times a day  chlorhexidine 4% Liquid 1 Application(s) Topical daily  fentaNYL   Infusion 4 MICROgram(s)/kG/Hr IV Continuous <Continuous>  insulin lispro (HumaLOG) corrective regimen sliding scale   SubCutaneous every 6 hours  methylPREDNISolone sodium succinate Injectable 50 milliGRAM(s) IV Push every 12 hours  midazolam Infusion 1 mG/Hr IV Continuous <Continuous>  norepinephrine Infusion 0.04 MICROgram(s)/kG/Min IV Continuous <Continuous>  pantoprazole  Injectable 40 milliGRAM(s) IV Push daily  petrolatum Ophthalmic Ointment 1 Application(s) Both EYES two times a day  propofol Infusion 50 MICROgram(s)/kG/Min IV Continuous <Continuous>  sevelamer carbonate Powder 1600 milliGRAM(s) Oral three times a day with meals  tacrolimus 1 milliGRAM(s) Oral every 12 hours    PRN Inpatient Medications      REVIEW OF SYSTEMS  --------------------------------------------------------------------------------  unable to obtain    VITALS/PHYSICAL EXAM  --------------------------------------------------------------------------------  T(C): 37.2 (12-25-17 @ 04:00), Max: 37.2 (12-25-17 @ 04:00)  HR: 95 (12-25-17 @ 07:38) (77 - 95)  BP: 124/55 (12-25-17 @ 06:00) (74/41 - 145/59)  RR: 18 (12-25-17 @ 06:00) (18 - 18)  SpO2: 94% (12-25-17 @ 07:38) (88% - 96%)  Wt(kg): --        12-24-17 @ 07:01  -  12-25-17 @ 07:00  --------------------------------------------------------  IN: 1777 mL / OUT: 2675 mL / NET: -898 mL      Physical Exam:  	Gen: NAD  	HEENT: +ETT  	Pulm: CTA B/L anteriorly crepitations  	CV: S1S2; no rub  	Abd: soft, rectal tube  	: marie in place  	UE: anasarca  	LE: anasarca  	Neuro: sedated  	Skin: cool  	Vascular access: right upper extremity AVF      LABS/STUDIES  --------------------------------------------------------------------------------              6.8    39.64 >-----------<  513      [12-25-17 @ 02:57]              23.6     140  |  98  |  57  ----------------------------<  154      [12-25-17 @ 02:57]  4.5   |  27  |  2.32        Ca     7.9     [12-25-17 @ 02:57]      Mg     2.3     [12-25-17 @ 02:57]      Phos  6.4     [12-25-17 @ 02:57]    TPro  4.8  /  Alb  2.6  /  TBili  0.2  /  DBili  x   /  AST  39  /  ALT  46  /  AlkPhos  98  [12-25-17 @ 02:57]    PT/INR: PT 11.3 , INR 1.02       [12-24-17 @ 03:06]  PTT: 24.9       [12-24-17 @ 03:06]      Creatinine Trend:  SCr 2.32 [12-25 @ 02:57]  SCr 2.52 [12-24 @ 02:45]  SCr 3.40 [12-23 @ 02:35]  SCr 3.00 [12-22 @ 17:12]  SCr 4.34 [12-22 @ 02:48]    Urinalysis - [12-18-17 @ 09:35]      Color YELLOW / Appearance HAZY / SG 1.022 / pH 5.5      Gluc NEGATIVE / Ketone NEGATIVE  / Bili NEGATIVE / Urobili NORMAL       Blood MODERATE / Protein 100 / Leuk Est LARGE / Nitrite NEGATIVE      RBC >50 / WBC >50 / Hyaline  / Gran  / Sq Epi OCC / Non Sq Epi  / Bacteria     Urine Creatinine 194.64      [12-18-17 @ 09:35]  Urine Sodium 27      [12-18-17 @ 09:35]  Urine Urea Nitrogen 310.7      [12-18-17 @ 09:35]      HBsAb 61.6      [12-21-17 @ 17:10]  HBsAg NEGATIVE      [12-21-17 @ 17:10]  HBcAb Nonreactive      [12-21-17 @ 17:10]  HCV 0.06, Nonreactive Hepatitis C AB  S/CO Ratio                        Interpretation  < 1.0                                     Non-Reactive  1.0 - 4.9                           Weakly-Reactive  > 5.0                                 Reactive  Non-Reactive: Aperson with a non-reactive HCV antibody  result is considered uninfected.  No further action is  needed unless recent infection is suspected.  In these  cases, consider repeat testing later to detect  seroconversion..  Weakly-Reactive: HCV antibody test is abnormal, HCV RNA  Qualitative test will follow.  Reactive: HCV antibody test is abnormal, HCV RNA  Qualitative test will follow.  Note: HCV antibody testing is performed on the [employer] system.      [12-21-17 @ 17:10]    ANCA: cANCA Negative, pANCA Negative, atypical ANCA --      [12-20-17 @ 05:00]

## 2017-12-25 NOTE — PROGRESS NOTE ADULT - PROBLEM SELECTOR PLAN 1
PATRICIA in setting of critical illness and elevated tacrolimus dose.  Pt remains  volume overloaded but FiO2 requirement improved with UF yesterday. Serum potassium improved.  No plan for HD today. Monitor Scr and urine output.  Recommend strict I+O

## 2017-12-25 NOTE — PROGRESS NOTE ADULT - SUBJECTIVE AND OBJECTIVE BOX
CHIEF COMPLAINT:    Interval Events:    REVIEW OF SYSTEMS:  Constitutional: [ ] fevers [ ] chills [ ] weight loss [ ] weight gain  HEENT: [ ] dry eyes [ ] eye irritation [ ] postnasal drip [ ] nasal congestion  CV: [ ] chest pain [ ] orthopnea [ ] palpitations [ ] murmur  Resp: [ ] cough [ ] shortness of breath [ ] dyspnea [ ] wheezing [ ] sputum [ ] hemoptysis  GI: [ ] nausea [ ] vomiting [ ] diarrhea [ ] constipation [ ] abd pain [ ] dysphagia   : [ ] dysuria [ ] nocturia [ ] hematuria [ ] increased urinary frequency  Musculoskeletal: [ ] back pain [ ] myalgias [ ] arthralgias [ ] fracture  Skin: [ ] rash [ ] itch  Neurological: [ ] headache [ ] dizziness [ ] syncope [ ] weakness [ ] numbness  Psychiatric: [ ] anxiety [ ] depression  Endocrine: [ ] diabetes [ ] thyroid problem  Hematologic/Lymphatic: [ ] anemia [ ] bleeding problem  Allergic/Immunologic: [ ] itchy eyes [ ] nasal discharge [ ] hives [ ] angioedema  [ ] All other systems negative  [ ] Unable to assess ROS because ________    OBJECTIVE:  ICU Vital Signs Last 24 Hrs  T(C): 37.1 (25 Dec 2017 07:53), Max: 37.2 (25 Dec 2017 04:00)  T(F): 98.7 (25 Dec 2017 07:53), Max: 98.9 (25 Dec 2017 04:00)  HR: 97 (25 Dec 2017 08:00) (77 - 106)  BP: 103/51 (25 Dec 2017 08:00) (74/41 - 145/59)  BP(mean): 60 (25 Dec 2017 08:00) (47 - 80)  ABP: --  ABP(mean): --  RR: 18 (25 Dec 2017 08:00) (18 - 21)  SpO2: 92% (25 Dec 2017 08:00) (88% - 96%)    Mode: AC/ CMV (Assist Control/ Continuous Mandatory Ventilation), RR (machine): 18, TV (machine): 450, FiO2: 50, PEEP: 5, MAP: 9, PIP: 28    12-24 @ 07:01  -  12-25 @ 07:00  --------------------------------------------------------  IN: 1777 mL / OUT: 2675 mL / NET: -898 mL    12-25 @ 07:01  -  12-25 @ 08:28  --------------------------------------------------------  IN: 0 mL / OUT: 100 mL / NET: -100 mL      CAPILLARY BLOOD GLUCOSE      POCT Blood Glucose.: 176 mg/dL (25 Dec 2017 05:24)      PHYSICAL EXAM:  General:   HEENT:   Lymph Nodes:  Neck:   Respiratory:   Cardiovascular:   Abdomen:   Extremities:   Skin:   Neurological:  Psychiatry:    LINES:    HOSPITAL MEDICATIONS:  MEDICATIONS  (STANDING):  atorvastatin 10 milliGRAM(s) Oral at bedtime  chlorhexidine 0.12% Liquid 15 milliLiter(s) Swish and Spit two times a day  chlorhexidine 4% Liquid 1 Application(s) Topical daily  fentaNYL   Infusion 4 MICROgram(s)/kG/Hr (33.44 mL/Hr) IV Continuous <Continuous>  insulin lispro (HumaLOG) corrective regimen sliding scale   SubCutaneous every 6 hours  methylPREDNISolone sodium succinate Injectable 50 milliGRAM(s) IV Push every 12 hours  midazolam Infusion 1 mG/Hr (1 mL/Hr) IV Continuous <Continuous>  norepinephrine Infusion 0.04 MICROgram(s)/kG/Min (6.27 mL/Hr) IV Continuous <Continuous>  pantoprazole  Injectable 40 milliGRAM(s) IV Push daily  petrolatum Ophthalmic Ointment 1 Application(s) Both EYES two times a day  propofol Infusion 50 MICROgram(s)/kG/Min (25.08 mL/Hr) IV Continuous <Continuous>  sevelamer carbonate Powder 1600 milliGRAM(s) Oral three times a day with meals  tacrolimus 1 milliGRAM(s) Oral every 12 hours    MEDICATIONS  (PRN):      LABS:                        6.8    39.64 )-----------( 513      ( 25 Dec 2017 02:57 )             23.6     Hgb Trend: 6.8<--, 7.0<--, 7.2<--, 8.6<--, 7.9<--  12-25    140  |  98  |  57<H>  ----------------------------<  154<H>  4.5   |  27  |  2.32<H>    Ca    7.9<L>      25 Dec 2017 02:57  Phos  6.4     12-25  Mg     2.3     12-25    TPro  4.8<L>  /  Alb  2.6<L>  /  TBili  0.2  /  DBili  x   /  AST  39  /  ALT  46<H>  /  AlkPhos  98  12-25    Creatinine Trend: 2.32<--, 2.52<--, 3.40<--, 3.00<--, 4.34<--, 5.75<--  PT/INR - ( 24 Dec 2017 03:06 )   PT: 11.3 SEC;   INR: 1.02          PTT - ( 24 Dec 2017 03:06 )  PTT:24.9 SEC    Arterial Blood Gas:  12-24 @ 05:23  7.36/46/148/25/98.8/0.5  ABG lactate: 1.0  Arterial Blood Gas:  12-23 @ 23:50  7.59/26/185/27/99.8/2.6  ABG lactate: 1.3        MICROBIOLOGY:     RADIOLOGY:  [ ] Reviewed and interpreted by me    EKG: Interval Events: Patient noted to have very dark, ? greenish output from rectal tube. Sedation/pressors turned off this AM. Patient not following commands and not responding to painful stimuli this AM.     REVIEW OF SYSTEMS:  Constitutional: [ ] fevers [ ] chills [ ] weight loss [ ] weight gain  HEENT: [ ] dry eyes [ ] eye irritation [ ] postnasal drip [ ] nasal congestion  CV: [ ] chest pain [ ] orthopnea [ ] palpitations [ ] murmur  Resp: [ ] cough [ ] shortness of breath [ ] dyspnea [ ] wheezing [ ] sputum [ ] hemoptysis  GI: [ ] nausea [ ] vomiting [ ] diarrhea [ ] constipation [ ] abd pain [ ] dysphagia   : [ ] dysuria [ ] nocturia [ ] hematuria [ ] increased urinary frequency  Musculoskeletal: [ ] back pain [ ] myalgias [ ] arthralgias [ ] fracture  Skin: [ ] rash [ ] itch  Neurological: [ ] headache [ ] dizziness [ ] syncope [ ] weakness [ ] numbness  Psychiatric: [ ] anxiety [ ] depression  Endocrine: [ ] diabetes [ ] thyroid problem  Hematologic/Lymphatic: [ ] anemia [ ] bleeding problem  Allergic/Immunologic: [ ] itchy eyes [ ] nasal discharge [ ] hives [ ] angioedema  [ ] All other systems negative  [x] Unable to assess ROS because pt is intubated and sedated    OBJECTIVE:  ICU Vital Signs Last 24 Hrs  T(C): 37.1 (25 Dec 2017 07:53), Max: 37.2 (25 Dec 2017 04:00)  T(F): 98.7 (25 Dec 2017 07:53), Max: 98.9 (25 Dec 2017 04:00)  HR: 97 (25 Dec 2017 08:00) (77 - 106)  BP: 103/51 (25 Dec 2017 08:00) (74/41 - 145/59)  BP(mean): 60 (25 Dec 2017 08:00) (47 - 80)  ABP: --  ABP(mean): --  RR: 18 (25 Dec 2017 08:00) (18 - 21)  SpO2: 92% (25 Dec 2017 08:00) (88% - 96%)    Mode: AC/ CMV (Assist Control/ Continuous Mandatory Ventilation), RR (machine): 18, TV (machine): 450, FiO2: 50, PEEP: 5, MAP: 9, PIP: 28    12-24 @ 07:01  -  12-25 @ 07:00  --------------------------------------------------------  IN: 1777 mL / OUT: 2675 mL / NET: -898 mL    12-25 @ 07:01  - 12-25 @ 08:28  --------------------------------------------------------  IN: 0 mL / OUT: 100 mL / NET: -100 mL      CAPILLARY BLOOD GLUCOSE      POCT Blood Glucose.: 176 mg/dL (25 Dec 2017 05:24)    PHYSICAL EXAM:  General: Intubated and sedated  HEENT: PERRLA, Slightly dry mucous membranes  Respiratory: Intubated, Grossly clear anteriorly, R chest tube in place  Cardiovascular: RRR; +S1/S2  Abdomen: + BS, Soft, Rectal tube with very dark ? greenish output, OGT in place  Extremities: Diffuse edema of extremities  Skin: Warm and dry  Neurological: Sedated, Not following commands, Not responding to painful stimuli  Psychiatry: Unable to asses    LINES: [facility]    HOSPITAL MEDICATIONS:  MEDICATIONS  (STANDING):  atorvastatin 10 milliGRAM(s) Oral at bedtime  chlorhexidine 0.12% Liquid 15 milliLiter(s) Swish and Spit two times a day  chlorhexidine 4% Liquid 1 Application(s) Topical daily  fentaNYL   Infusion 4 MICROgram(s)/kG/Hr (33.44 mL/Hr) IV Continuous <Continuous>  insulin lispro (HumaLOG) corrective regimen sliding scale   SubCutaneous every 6 hours  methylPREDNISolone sodium succinate Injectable 50 milliGRAM(s) IV Push every 12 hours  midazolam Infusion 1 mG/Hr (1 mL/Hr) IV Continuous <Continuous>  norepinephrine Infusion 0.04 MICROgram(s)/kG/Min (6.27 mL/Hr) IV Continuous <Continuous>  pantoprazole  Injectable 40 milliGRAM(s) IV Push daily  petrolatum Ophthalmic Ointment 1 Application(s) Both EYES two times a day  propofol Infusion 50 MICROgram(s)/kG/Min (25.08 mL/Hr) IV Continuous <Continuous>  sevelamer carbonate Powder 1600 milliGRAM(s) Oral three times a day with meals  tacrolimus 1 milliGRAM(s) Oral every 12 hours    MEDICATIONS  (PRN):      LABS:                        6.8    39.64 )-----------( 513      ( 25 Dec 2017 02:57 )             23.6     Hgb Trend: 6.8<--, 7.0<--, 7.2<--, 8.6<--, 7.9<--  12-25    140  |  98  |  57<H>  ----------------------------<  154<H>  4.5   |  27  |  2.32<H>    Ca    7.9<L>      25 Dec 2017 02:57  Phos  6.4     12-25  Mg     2.3     12-25    TPro  4.8<L>  /  Alb  2.6<L>  /  TBili  0.2  /  DBili  x   /  AST  39  /  ALT  46<H>  /  AlkPhos  98  12-25    Creatinine Trend: 2.32<--, 2.52<--, 3.40<--, 3.00<--, 4.34<--, 5.75<--  PT/INR - ( 24 Dec 2017 03:06 )   PT: 11.3 SEC;   INR: 1.02          PTT - ( 24 Dec 2017 03:06 )  PTT:24.9 SEC    Arterial Blood Gas:  12-24 @ 05:23  7.36/46/148/25/98.8/0.5  ABG lactate: 1.0  Arterial Blood Gas:  12-23 @ 23:50  7.59/26/185/27/99.8/2.6  ABG lactate: 1.3

## 2017-12-26 LAB
ALBUMIN SERPL ELPH-MCNC: 2.3 G/DL — LOW (ref 3.3–5)
ALP SERPL-CCNC: 87 U/L — SIGNIFICANT CHANGE UP (ref 40–120)
ALT FLD-CCNC: 33 U/L — SIGNIFICANT CHANGE UP (ref 4–41)
AST SERPL-CCNC: 23 U/L — SIGNIFICANT CHANGE UP (ref 4–40)
BASE EXCESS BLDA CALC-SCNC: -1.4 MMOL/L — SIGNIFICANT CHANGE UP
BASOPHILS # BLD AUTO: 0.05 K/UL — SIGNIFICANT CHANGE UP (ref 0–0.2)
BASOPHILS NFR BLD AUTO: 0.1 % — SIGNIFICANT CHANGE UP (ref 0–2)
BILIRUB SERPL-MCNC: 0.2 MG/DL — SIGNIFICANT CHANGE UP (ref 0.2–1.2)
BUN SERPL-MCNC: 86 MG/DL — HIGH (ref 7–23)
CALCIUM SERPL-MCNC: 7.9 MG/DL — LOW (ref 8.4–10.5)
CHLORIDE BLDA-SCNC: 99 MMOL/L — SIGNIFICANT CHANGE UP (ref 96–108)
CHLORIDE SERPL-SCNC: 97 MMOL/L — LOW (ref 98–107)
CO2 SERPL-SCNC: 23 MMOL/L — SIGNIFICANT CHANGE UP (ref 22–31)
CREAT SERPL-MCNC: 2.91 MG/DL — HIGH (ref 0.5–1.3)
EOSINOPHIL # BLD AUTO: 0.06 K/UL — SIGNIFICANT CHANGE UP (ref 0–0.5)
EOSINOPHIL NFR BLD AUTO: 0.1 % — SIGNIFICANT CHANGE UP (ref 0–6)
GLUCOSE BLDA-MCNC: 175 MG/DL — HIGH (ref 70–99)
GLUCOSE BLDC GLUCOMTR-MCNC: 113 MG/DL — HIGH (ref 70–99)
GLUCOSE BLDC GLUCOMTR-MCNC: 157 MG/DL — HIGH (ref 70–99)
GLUCOSE BLDC GLUCOMTR-MCNC: 175 MG/DL — HIGH (ref 70–99)
GLUCOSE SERPL-MCNC: 165 MG/DL — HIGH (ref 70–99)
HCO3 BLDA-SCNC: 23 MMOL/L — SIGNIFICANT CHANGE UP (ref 22–26)
HCT VFR BLD CALC: 23 % — LOW (ref 39–50)
HCT VFR BLD CALC: 26.7 % — LOW (ref 39–50)
HCT VFR BLDA CALC: 24.1 % — LOW (ref 39–51)
HGB BLD-MCNC: 7 G/DL — CRITICAL LOW (ref 13–17)
HGB BLD-MCNC: 8.3 G/DL — LOW (ref 13–17)
HGB BLDA-MCNC: 7.7 G/DL — LOW (ref 13–17)
IMM GRANULOCYTES # BLD AUTO: 1.39 # — SIGNIFICANT CHANGE UP
IMM GRANULOCYTES NFR BLD AUTO: 3.4 % — HIGH (ref 0–1.5)
LACTATE BLDA-SCNC: 0.8 MMOL/L — SIGNIFICANT CHANGE UP (ref 0.5–2)
LYMPHOCYTES # BLD AUTO: 0.67 K/UL — LOW (ref 1–3.3)
LYMPHOCYTES # BLD AUTO: 1.6 % — LOW (ref 13–44)
MAGNESIUM SERPL-MCNC: 2.5 MG/DL — SIGNIFICANT CHANGE UP (ref 1.6–2.6)
MCHC RBC-ENTMCNC: 23.1 PG — LOW (ref 27–34)
MCHC RBC-ENTMCNC: 24.1 PG — LOW (ref 27–34)
MCHC RBC-ENTMCNC: 30.4 % — LOW (ref 32–36)
MCHC RBC-ENTMCNC: 31.1 % — LOW (ref 32–36)
MCV RBC AUTO: 75.9 FL — LOW (ref 80–100)
MCV RBC AUTO: 77.6 FL — LOW (ref 80–100)
MONOCYTES # BLD AUTO: 1.28 K/UL — HIGH (ref 0–0.9)
MONOCYTES NFR BLD AUTO: 3.1 % — SIGNIFICANT CHANGE UP (ref 2–14)
NEUTROPHILS # BLD AUTO: 37.73 K/UL — HIGH (ref 1.8–7.4)
NEUTROPHILS NFR BLD AUTO: 91.7 % — HIGH (ref 43–77)
NRBC # FLD: 0.17 — SIGNIFICANT CHANGE UP
NRBC # FLD: 0.39 — SIGNIFICANT CHANGE UP
PCO2 BLDA: 44 MMHG — SIGNIFICANT CHANGE UP (ref 35–48)
PH BLDA: 7.35 PH — SIGNIFICANT CHANGE UP (ref 7.35–7.45)
PHOSPHATE SERPL-MCNC: 7.8 MG/DL — HIGH (ref 2.5–4.5)
PLATELET # BLD AUTO: 389 K/UL — SIGNIFICANT CHANGE UP (ref 150–400)
PLATELET # BLD AUTO: 497 K/UL — HIGH (ref 150–400)
PMV BLD: 9.5 FL — SIGNIFICANT CHANGE UP (ref 7–13)
PMV BLD: 9.9 FL — SIGNIFICANT CHANGE UP (ref 7–13)
PO2 BLDA: 69 MMHG — LOW (ref 83–108)
POTASSIUM BLDA-SCNC: 4.7 MMOL/L — HIGH (ref 3.4–4.5)
POTASSIUM SERPL-MCNC: 4.8 MMOL/L — SIGNIFICANT CHANGE UP (ref 3.5–5.3)
POTASSIUM SERPL-SCNC: 4.8 MMOL/L — SIGNIFICANT CHANGE UP (ref 3.5–5.3)
PROT SERPL-MCNC: 4.4 G/DL — LOW (ref 6–8.3)
RBC # BLD: 3.03 M/UL — LOW (ref 4.2–5.8)
RBC # BLD: 3.44 M/UL — LOW (ref 4.2–5.8)
RBC # FLD: 22.1 % — HIGH (ref 10.3–14.5)
RBC # FLD: 22.1 % — HIGH (ref 10.3–14.5)
SAO2 % BLDA: 91.7 % — LOW (ref 95–99)
SODIUM BLDA-SCNC: 134 MMOL/L — LOW (ref 136–146)
SODIUM SERPL-SCNC: 137 MMOL/L — SIGNIFICANT CHANGE UP (ref 135–145)
WBC # BLD: 35.98 K/UL — HIGH (ref 3.8–10.5)
WBC # BLD: 41.18 K/UL — CRITICAL HIGH (ref 3.8–10.5)
WBC # FLD AUTO: 35.98 K/UL — HIGH (ref 3.8–10.5)
WBC # FLD AUTO: 41.18 K/UL — CRITICAL HIGH (ref 3.8–10.5)

## 2017-12-26 PROCEDURE — 99291 CRITICAL CARE FIRST HOUR: CPT | Mod: 25

## 2017-12-26 PROCEDURE — 99223 1ST HOSP IP/OBS HIGH 75: CPT | Mod: GC

## 2017-12-26 RX ORDER — ATOVAQUONE 750 MG/5ML
1500 SUSPENSION ORAL DAILY
Qty: 0 | Refills: 0 | Status: DISCONTINUED | OUTPATIENT
Start: 2017-12-26 | End: 2017-12-28

## 2017-12-26 RX ORDER — PANTOPRAZOLE SODIUM 20 MG/1
8 TABLET, DELAYED RELEASE ORAL
Qty: 80 | Refills: 0 | Status: DISCONTINUED | OUTPATIENT
Start: 2017-12-26 | End: 2017-12-28

## 2017-12-26 RX ADMIN — FENTANYL CITRATE 16.72 MICROGRAM(S)/KG/HR: 50 INJECTION INTRAVENOUS at 05:41

## 2017-12-26 RX ADMIN — CHLORHEXIDINE GLUCONATE 15 MILLILITER(S): 213 SOLUTION TOPICAL at 18:20

## 2017-12-26 RX ADMIN — Medication 1 APPLICATION(S): at 18:20

## 2017-12-26 RX ADMIN — SEVELAMER CARBONATE 1600 MILLIGRAM(S): 2400 POWDER, FOR SUSPENSION ORAL at 08:08

## 2017-12-26 RX ADMIN — SEVELAMER CARBONATE 1600 MILLIGRAM(S): 2400 POWDER, FOR SUSPENSION ORAL at 18:20

## 2017-12-26 RX ADMIN — Medication 6.27 MICROGRAM(S)/KG/MIN: at 05:41

## 2017-12-26 RX ADMIN — PANTOPRAZOLE SODIUM 40 MILLIGRAM(S): 20 TABLET, DELAYED RELEASE ORAL at 05:40

## 2017-12-26 RX ADMIN — SEVELAMER CARBONATE 1600 MILLIGRAM(S): 2400 POWDER, FOR SUSPENSION ORAL at 12:04

## 2017-12-26 RX ADMIN — Medication 1: at 12:05

## 2017-12-26 RX ADMIN — Medication 1 APPLICATION(S): at 05:39

## 2017-12-26 RX ADMIN — Medication 40 MILLIGRAM(S): at 18:20

## 2017-12-26 RX ADMIN — TACROLIMUS 1 MILLIGRAM(S): 5 CAPSULE ORAL at 18:20

## 2017-12-26 RX ADMIN — TACROLIMUS 1 MILLIGRAM(S): 5 CAPSULE ORAL at 05:40

## 2017-12-26 RX ADMIN — CHLORHEXIDINE GLUCONATE 15 MILLILITER(S): 213 SOLUTION TOPICAL at 05:40

## 2017-12-26 RX ADMIN — Medication 1: at 05:40

## 2017-12-26 RX ADMIN — PROPOFOL 25.08 MICROGRAM(S)/KG/MIN: 10 INJECTION, EMULSION INTRAVENOUS at 12:04

## 2017-12-26 RX ADMIN — PANTOPRAZOLE SODIUM 10 MG/HR: 20 TABLET, DELAYED RELEASE ORAL at 12:05

## 2017-12-26 RX ADMIN — CHLORHEXIDINE GLUCONATE 1 APPLICATION(S): 213 SOLUTION TOPICAL at 11:42

## 2017-12-26 RX ADMIN — PROPOFOL 25.08 MICROGRAM(S)/KG/MIN: 10 INJECTION, EMULSION INTRAVENOUS at 05:41

## 2017-12-26 RX ADMIN — ATOVAQUONE 1500 MILLIGRAM(S): 750 SUSPENSION ORAL at 12:05

## 2017-12-26 NOTE — CONSULT NOTE ADULT - ASSESSMENT
Impression:  1) Acute on chronic anemia: no current evidence of active bleeding given brown liquid output from rectal tube without clots and bilious output from OGT. Current Hgb 7.0 from 6.8 yesterday with 1 unit pRBC.  2) Acute respiratory failure  3) Prostate cancer s/p chemotherapy  4) ESRD 2/2 HTN s/p renal transplant on immunosuppression     Plan:  - given comorbidities and lack of active bleeding, no plan for endoscopic evaluation at this time  - CBC daily; transfuse to Hgb > 7  - PPI IV BID while NGT/OGT in place  - if active/overt bleeding, would consider EGD  - monitor rectal tube output  - above plan discussed with brother at bedside

## 2017-12-26 NOTE — CONSULT NOTE ADULT - ATTENDING COMMENTS
Patient with immunosuppression, CKD, Chronic anemia, without evidence of GI blood loss causing fall in Hgb. More likely related to bone marrow suppression. No clots in the fecal output of rectal tube.

## 2017-12-26 NOTE — PROGRESS NOTE ADULT - PROBLEM SELECTOR PLAN 1
PATRICIA in setting of critical illness and elevated tacrolimus dose. Patient remains fluid overload. However urine output has improved since yesterday and serum potassium has improved. Check urine culture to rule out pyelonephritis. No plan for HD today. Monitor Scr and urine output.  Recommend strict I+O PATRICIA in setting of critical illness and elevated tacrolimus dose. Patient remains fluid overload. However urine output has improved since yesterday and serum potassium has improved. Check urine culture to rule out pyelonephritis of transplant kidney. No plan for HD today. Monitor Scr and urine output.  Recommend strict I+O.

## 2017-12-26 NOTE — DIETITIAN INITIAL EVALUATION ADULT. - OTHER INFO
Pt referred for nutrition consult 2/2 tube feeding.  Spoke w/RN who stated that pt is experiencing a GI bleed and that TF has been stopped.  Noted HD initiated  2/2 worsening uremia.  Wt increase noted since admission - likely fluid related.  Pt DNR.

## 2017-12-26 NOTE — PROGRESS NOTE ADULT - SUBJECTIVE AND OBJECTIVE BOX
[facility] Division of Kidney Diseases & Hypertension  FOLLOW UP NOTE  [phone]--------------------------------------------------------------------------------    HPI: 72-year-old male with h/o ESRD s/p kidney transplantation in 2013, CKD stage III, prostate cancer and HTN presented with complaints of SOB and cough for the past 3 days. Patient is currently in MICU for management of respiratory failure. Nephrology was consulted for elevated Scr and management of transplant immunosuppression. [facility], patient had DDRT done on January 2013 at [facility]. Post transplant course was complicated by delayed graft function and perforated bladder. Patient currently follows with his outpatient nephrologist Dr. Luevano for kidney transplant management. Scr was 1.37 on labs done on 10/30/17. His outpatient transplant medications are tacrolimus 2 mg PO BID, and myfortic 360 mg PO BID.  He was intubated due to worsening respiratory status. Patient was initiated on HD on 12/21/17 due to worsening uremia and hyperkalemia. Dialyzed again 12/22 for hyperkalemia.  Patient had rapid heart rate requiring cardizem during HD 12/23.  Last HD was on 12/24/17.  Today patient seen and evaluated in MICU; currently intubated and sedated.       PAST HISTORY  --------------------------------------------------------------------------------  No significant changes to PMH, PSH, FHx, SHx, unless otherwise noted    ALLERGIES & MEDICATIONS  --------------------------------------------------------------------------------  Allergies    No Known Allergies    Intolerances      Standing Inpatient Medications  chlorhexidine 0.12% Liquid 15 milliLiter(s) Swish and Spit two times a day  chlorhexidine 4% Liquid 1 Application(s) Topical daily  fentaNYL   Infusion 2 MICROgram(s)/kG/Hr IV Continuous <Continuous>  insulin lispro (HumaLOG) corrective regimen sliding scale   SubCutaneous every 6 hours  methylPREDNISolone sodium succinate Injectable 50 milliGRAM(s) IV Push every 12 hours  midazolam Infusion 1 mG/Hr IV Continuous <Continuous>  norepinephrine Infusion 0.04 MICROgram(s)/kG/Min IV Continuous <Continuous>  pantoprazole  Injectable 40 milliGRAM(s) IV Push two times a day  petrolatum Ophthalmic Ointment 1 Application(s) Both EYES two times a day  propofol Infusion 50 MICROgram(s)/kG/Min IV Continuous <Continuous>  sevelamer carbonate Powder 1600 milliGRAM(s) Oral three times a day with meals  tacrolimus 1 milliGRAM(s) Oral every 12 hours    PRN Inpatient Medications      REVIEW OF SYSTEMS  --------------------------------------------------------------------------------  Unable to obtain.    VITALS/PHYSICAL EXAM  --------------------------------------------------------------------------------  T(C): 36.7 (12-26-17 @ 00:00), Max: 37.2 (12-25-17 @ 12:00)  HR: 80 (12-26-17 @ 07:21) (79 - 144)  BP: 124/55 (12-26-17 @ 07:00) (88/46 - 152/65)  RR: 18 (12-26-17 @ 07:00) (17 - 27)  SpO2: 95% (12-26-17 @ 07:21) (90% - 97%)  Wt(kg): --        12-25-17 @ 07:01  -  12-26-17 @ 07:00  --------------------------------------------------------  IN: 1308.4 mL / OUT: 763 mL / NET: 545.4 mL      Physical Exam:  	Gen: NAD  	HEENT: +ETT  	Pulm: CTA B/L anteriorly crepitations  	CV: S1S2; no rub  	Abd: soft, rectal tube  	: marie in place  	UE: anasarca  	LE: anasarca  	Neuro: sedated  	Skin: cool  	Vascular access: right upper extremity AVF    LABS/STUDIES  --------------------------------------------------------------------------------              7.0    41.18 >-----------<  497      [12-26-17 @ 02:35]              23.0     137  |  97  |  86  ----------------------------<  165      [12-26-17 @ 02:35]  4.8   |  23  |  2.91        Ca     7.9     [12-26-17 @ 02:35]      Mg     2.5     [12-26-17 @ 02:35]      Phos  7.8     [12-26-17 @ 02:35]    TPro  4.4  /  Alb  2.3  /  TBili  0.2  /  DBili  x   /  AST  23  /  ALT  33  /  AlkPhos  87  [12-26-17 @ 02:35]          Creatinine Trend:  SCr 2.91 [12-26 @ 02:35]  SCr 2.32 [12-25 @ 02:57]  SCr 2.52 [12-24 @ 02:45]  SCr 3.40 [12-23 @ 02:35]  SCr 3.00 [12-22 @ 17:12]          HBsAb 61.6      [12-21-17 @ 17:10]  HBsAg NEGATIVE      [12-21-17 @ 17:10]  HBcAb Nonreactive      [12-21-17 @ 17:10]  HCV 0.06, Nonreactive Hepatitis C AB  S/CO Ratio                        Interpretation  < 1.0                                     Non-Reactive  1.0 - 4.9                           Weakly-Reactive  > 5.0                                 Reactive  Non-Reactive: Aperson with a non-reactive HCV antibody  result is considered uninfected.  No further action is  needed unless recent infection is suspected.  In these  cases, consider repeat testing later to detect  seroconversion..  Weakly-Reactive: HCV antibody test is abnormal, HCV RNA  Qualitative test will follow.  Reactive: HCV antibody test is abnormal, HCV RNA  Qualitative test will follow.  Note: HCV antibody testing is performed on the [employer] system.      [12-21-17 @ 17:10]    ANCA: cANCA Negative, pANCA Negative, atypical ANCA --      [12-20-17 @ 05:00] [facility] Division of Kidney Diseases & Hypertension  FOLLOW UP NOTE  [phone]--------------------------------------------------------------------------------    HPI: 72-year-old male with h/o ESRD s/p kidney transplantation in 2013, CKD stage III, prostate cancer and HTN presented with complaints of SOB and cough for the past 3 days. Patient is currently in MICU for management of respiratory failure. Nephrology was consulted for elevated Scr and management of transplant immunosuppression. [facility] done on January 2013 at [facility]. Post transplant course was complicated by delayed graft function and perforated bladder. Patient currently follows with his outpatient nephrologist Dr. Luevano for kidney transplant management. Scr was 1.37 on labs done on 10/30/17. His outpatient transplant medications are tacrolimus 2 mg PO BID, and myfortic 360 mg PO BID.  He was intubated due to worsening respiratory status. Patient was initiated on HD on 12/21/17 due to worsening uremia and hyperkalemia. Dialyzed again 12/22 for hyperkalemia.  Patient had rapid heart rate requiring cardizem during HD 12/23.  Last HD was on 12/24/17.  Today patient seen and evaluated in MICU; currently intubated and sedated.  Urine output improved overnight.  Fio2 at 50%.      PAST HISTORY  --------------------------------------------------------------------------------  No significant changes to PMH, PSH, FHx, SHx, unless otherwise noted    ALLERGIES & MEDICATIONS  --------------------------------------------------------------------------------  Allergies    No Known Allergies    Intolerances      Standing Inpatient Medications  chlorhexidine 0.12% Liquid 15 milliLiter(s) Swish and Spit two times a day  chlorhexidine 4% Liquid 1 Application(s) Topical daily  fentaNYL   Infusion 2 MICROgram(s)/kG/Hr IV Continuous <Continuous>  insulin lispro (HumaLOG) corrective regimen sliding scale   SubCutaneous every 6 hours  methylPREDNISolone sodium succinate Injectable 50 milliGRAM(s) IV Push every 12 hours  midazolam Infusion 1 mG/Hr IV Continuous <Continuous>  norepinephrine Infusion 0.04 MICROgram(s)/kG/Min IV Continuous <Continuous>  pantoprazole  Injectable 40 milliGRAM(s) IV Push two times a day  petrolatum Ophthalmic Ointment 1 Application(s) Both EYES two times a day  propofol Infusion 50 MICROgram(s)/kG/Min IV Continuous <Continuous>  sevelamer carbonate Powder 1600 milliGRAM(s) Oral three times a day with meals  tacrolimus 1 milliGRAM(s) Oral every 12 hours    PRN Inpatient Medications      REVIEW OF SYSTEMS  --------------------------------------------------------------------------------  Unable to obtain.    VITALS/PHYSICAL EXAM  --------------------------------------------------------------------------------  T(C): 36.7 (12-26-17 @ 00:00), Max: 37.2 (12-25-17 @ 12:00)  HR: 80 (12-26-17 @ 07:21) (79 - 144)  BP: 124/55 (12-26-17 @ 07:00) (88/46 - 152/65)  RR: 18 (12-26-17 @ 07:00) (17 - 27)  SpO2: 95% (12-26-17 @ 07:21) (90% - 97%)  Wt(kg): --        12-25-17 @ 07:01  -  12-26-17 @ 07:00  --------------------------------------------------------  IN: 1308.4 mL / OUT: 763 mL / NET: 545.4 mL      Physical Exam:  	Gen: NAD  	HEENT: +ETT  	Pulm: CTA B/L anteriorly crepitations  	CV: S1S2; no rub  	Abd: soft, rectal tube  	: marie in place  	UE: anasarca  	LE: anasarca  	Neuro: sedated  	Skin: cool  	Vascular access: right upper extremity AVF    LABS/STUDIES  --------------------------------------------------------------------------------              7.0    41.18 >-----------<  497      [12-26-17 @ 02:35]              23.0     137  |  97  |  86  ----------------------------<  165      [12-26-17 @ 02:35]  4.8   |  23  |  2.91        Ca     7.9     [12-26-17 @ 02:35]      Mg     2.5     [12-26-17 @ 02:35]      Phos  7.8     [12-26-17 @ 02:35]    TPro  4.4  /  Alb  2.3  /  TBili  0.2  /  DBili  x   /  AST  23  /  ALT  33  /  AlkPhos  87  [12-26-17 @ 02:35]          Creatinine Trend:  SCr 2.91 [12-26 @ 02:35]  SCr 2.32 [12-25 @ 02:57]  SCr 2.52 [12-24 @ 02:45]  SCr 3.40 [12-23 @ 02:35]  SCr 3.00 [12-22 @ 17:12]          HBsAb 61.6      [12-21-17 @ 17:10]  HBsAg NEGATIVE      [12-21-17 @ 17:10]  HBcAb Nonreactive      [12-21-17 @ 17:10]  HCV 0.06, Nonreactive Hepatitis C AB  S/CO Ratio                        Interpretation  < 1.0                                     Non-Reactive  1.0 - 4.9                           Weakly-Reactive  > 5.0                                 Reactive  Non-Reactive: Aperson with a non-reactive HCV antibody  result is considered uninfected.  No further action is  needed unless recent infection is suspected.  In these  cases, consider repeat testing later to detect  seroconversion..  Weakly-Reactive: HCV antibody test is abnormal, HCV RNA  Qualitative test will follow.  Reactive: HCV antibody test is abnormal, HCV RNA  Qualitative test will follow.  Note: HCV antibody testing is performed on the [employer]   system.      [12-21-17 @ 17:10]    ANCA: cANCA Negative, pANCA Negative, atypical ANCA --      [12-20-17 @ 05:00]

## 2017-12-26 NOTE — DIETITIAN INITIAL EVALUATION ADULT. - MD RECOMMEND
modular components/other/when medically feasible, may consider restart TF w/Nepro @25ml/h and advance as tolerated to goal of 40ml/h to provide 1728 kcal w/76gm protein and add

## 2017-12-26 NOTE — CONSULT NOTE ADULT - SUBJECTIVE AND OBJECTIVE BOX
GASTROENTEROLOGY INITIAL CONSULT NOTE    Chief Complaint:  Patient is a 72y old  Male who presents with a chief complaint of SOB for 4 days (16 Dec 2017 18:45)      HPI: 72y Male    Allergies:  No Known Allergies      Hospital Medications:  atovaquone Suspension 1500 milliGRAM(s) Oral daily  chlorhexidine 0.12% Liquid 15 milliLiter(s) Swish and Spit two times a day  chlorhexidine 4% Liquid 1 Application(s) Topical daily  fentaNYL   Infusion 2 MICROgram(s)/kG/Hr IV Continuous <Continuous>  insulin lispro (HumaLOG) corrective regimen sliding scale   SubCutaneous every 6 hours  methylPREDNISolone sodium succinate Injectable 40 milliGRAM(s) IV Push every 12 hours  norepinephrine Infusion 0.04 MICROgram(s)/kG/Min IV Continuous <Continuous>  pantoprazole Infusion 8 mG/Hr IV Continuous <Continuous>  petrolatum Ophthalmic Ointment 1 Application(s) Both EYES two times a day  propofol Infusion 50 MICROgram(s)/kG/Min IV Continuous <Continuous>  sevelamer carbonate Powder 1600 milliGRAM(s) Oral three times a day with meals  tacrolimus 1 milliGRAM(s) Oral every 12 hours      PMHX/PSHX:  Dyslipidemia  HTN (Hypertension)  CKD (Chronic Kidney Disease)  History of renal transplant  History of Prostatectomy  AVF (Arteriovenous Fistula)      Family history:  No pertinent family history in first degree relatives      Social History:     ROS:     General:  No wt loss, fevers, chills, night sweats, fatigue,   Eyes:  Good vision, no reported pain  ENT:  No sore throat, pain, runny nose, dysphagia  CV:  No pain, palpitations, hypo/hypertension  Resp:  No dyspnea, cough, tachypnea, wheezing  GI:  see HPI  :  No pain, bleeding, incontinence, nocturia  Muscle:  No pain, weakness  Neuro:  No weakness, tingling, memory problems  Psych:  No fatigue, insomnia, mood problems, depression  Endocrine:  No polyuria, polydipsia, cold/heat intolerance  Heme:  No petechiae, ecchymosis, easy bruisability  Skin:  No rash, tattoos, scars, edema      PHYSICAL EXAM:   Vital Signs:  Vital Signs Last 24 Hrs  T(C): 36 (26 Dec 2017 12:00), Max: 36.7 (26 Dec 2017 00:00)  T(F): 96.8 (26 Dec 2017 12:00), Max: 98.1 (26 Dec 2017 00:00)  HR: 75 (26 Dec 2017 12:00) (75 - 144)  BP: 133/53 (26 Dec 2017 12:00) (88/46 - 152/65)  BP(mean): 71 (26 Dec 2017 12:00) (55 - 85)  RR: 18 (26 Dec 2017 12:00) (18 - 20)  SpO2: 96% (26 Dec 2017 12:00) (91% - 97%)  Daily     Daily Weight in k.4 (26 Dec 2017 12:39)    GENERAL:  no acute distress  HEENT:  -icterus   CHEST:  clear bilaterally, no wheezes or rales  HEART:  RRR, S1S2  ABDOMEN:  soft, non-tender, non-distended, normoactive bowel sounds,  no hepato-splenomegaly  EXTEREMITIES:  no  edema  SKIN:  No rash/erythema/ecchymoses/petechiae/wounds/abscess/warm/dry  NEURO:  alert, oriented, conversant     LABS:                        7.0    41.18 )-----------( 497      ( 26 Dec 2017 02:35 )             23.0     12-    137  |  97<L>  |  86<H>  ----------------------------<  165<H>  4.8   |  23  |  2.91<H>    Ca    7.9<L>      26 Dec 2017 02:35  Phos  7.8       Mg     2.5         TPro  4.4<L>  /  Alb  2.3<L>  /  TBili  0.2  /  DBili  x   /  AST  23  /  ALT  33  /  AlkPhos  87  12    LIVER FUNCTIONS - ( 26 Dec 2017 02:35 )  Alb: 2.3 g/dL / Pro: 4.4 g/dL / ALK PHOS: 87 u/L / ALT: 33 u/L / AST: 23 u/L / GGT: x                 Imaging: GASTROENTEROLOGY INITIAL CONSULT NOTE    Chief Complaint:  Patient is a 72y old  Male who presents with a chief complaint of SOB for 4 days (16 Dec 2017 18:45)      HPI: 72y Male with prostate CA s/p multiple rounds of chemotherapy, HTN, ESRD s/p renal transplant on immunosuppression, chronic anemia on Aransep who was admitted with SOB/cough eventually requiring intubation for hypoxic respiratory failure thought to be secondary to chemotherapy induced lung injury. GI consulted for worsening anemia and previous "dark" rectal tube output. Patient with hemoglobin on 6.8 yesterday, received 1 unit pRBC, now 7.0.     Allergies:  No Known Allergies      Hospital Medications:  atovaquone Suspension 1500 milliGRAM(s) Oral daily  chlorhexidine 0.12% Liquid 15 milliLiter(s) Swish and Spit two times a day  chlorhexidine 4% Liquid 1 Application(s) Topical daily  fentaNYL   Infusion 2 MICROgram(s)/kG/Hr IV Continuous <Continuous>  insulin lispro (HumaLOG) corrective regimen sliding scale   SubCutaneous every 6 hours  methylPREDNISolone sodium succinate Injectable 40 milliGRAM(s) IV Push every 12 hours  norepinephrine Infusion 0.04 MICROgram(s)/kG/Min IV Continuous <Continuous>  pantoprazole Infusion 8 mG/Hr IV Continuous <Continuous>  petrolatum Ophthalmic Ointment 1 Application(s) Both EYES two times a day  propofol Infusion 50 MICROgram(s)/kG/Min IV Continuous <Continuous>  sevelamer carbonate Powder 1600 milliGRAM(s) Oral three times a day with meals  tacrolimus 1 milliGRAM(s) Oral every 12 hours      PMHX/PSHX:  Dyslipidemia  HTN (Hypertension)  CKD (Chronic Kidney Disease)  History of renal transplant  History of Prostatectomy  AVF (Arteriovenous Fistula)      Family history:  No pertinent family history in first degree relatives      Social History:   Unable to obtain - intubated/sedated.     ROS:  Unable to obtain - intubated/sedated.       PHYSICAL EXAM:   Vital Signs:  Vital Signs Last 24 Hrs  T(C): 36 (26 Dec 2017 12:00), Max: 36.7 (26 Dec 2017 00:00)  T(F): 96.8 (26 Dec 2017 12:00), Max: 98.1 (26 Dec 2017 00:00)  HR: 75 (26 Dec 2017 12:00) (75 - 144)  BP: 133/53 (26 Dec 2017 12:00) (88/46 - 152/65)  BP(mean): 71 (26 Dec 2017 12:00) (55 - 85)  RR: 18 (26 Dec 2017 12:00) (18 - 20)  SpO2: 96% (26 Dec 2017 12:00) (91% - 97%)  Daily     Daily Weight in k.4 (26 Dec 2017 12:39)    GENERAL: intubated/sedated, family at bedside   HEENT:  -icterus   CHEST:  +vent sounds   HEART:  RRR, S1S2  ABDOMEN:  soft, non-tender, non-distended, rectal tube with dark brown liquid output   EXTEREMITIES:  trace  edema  SKIN:  No rash  NEURO:  sedated    LABS:                        7.0    41.18 )-----------( 497      ( 26 Dec 2017 02:35 )             23.0         137  |  97<L>  |  86<H>  ----------------------------<  165<H>  4.8   |  23  |  2.91<H>    Ca    7.9<L>      26 Dec 2017 02:35  Phos  7.8       Mg     2.5         TPro  4.4<L>  /  Alb  2.3<L>  /  TBili  0.2  /  DBili  x   /  AST  23  /  ALT  33  /  AlkPhos  87      LIVER FUNCTIONS - ( 26 Dec 2017 02:35 )  Alb: 2.3 g/dL / Pro: 4.4 g/dL / ALK PHOS: 87 u/L / ALT: 33 u/L / AST: 23 u/L / GGT: x

## 2017-12-26 NOTE — PROGRESS NOTE ADULT - PROBLEM SELECTOR PLAN 2
Patient with kidney transplantation (DDRT) in . C/w tacrolimus 1 mg BID.  goal tacro level 4 to 5 in setting of possible infection and hemodynamic PATRICIA.  Last FK is 3.3 on .   Monitor trough.  Myfortic on hold as patient with possible sepsis / critical illness.  Currently on solumedrol for possible pneumonitis.  Monitor tacrolimus level.  Check new medications for interactions through cy pathway. Patient with kidney transplantation (DDRT) in . C/w tacrolimus 1 mg BID.  goal tacro level 4 to 5 in setting of possible infection and hemodynamic PATRICIA.  Last FK is 3.3 on .   Monitor trough.  Myfortic on hold as patient with critical illness.  Currently on solumedrol for possible pneumonitis.  Monitor tacrolimus level.  Check new medications for interactions through cy pathway.

## 2017-12-26 NOTE — PROGRESS NOTE ADULT - ASSESSMENT
Patient is a 73 y/o M w/ a PMHx of Prostate cancer (per Heme/Onc, Dx 2006, surgery 2007, on Zoladex until 2015, followed by Casodex, Xtandi, Zytiga, and now on Taxotere with good PSA response. 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 Last chemo December 1st), ESRD s/p renal transplant w/CKDIII (2013, on Mycophenolate and tacrolimus, baseline Cr 1.2 – 1.5), HTN, who presents with 3 weeks of cough and 2 days of rapidly increasing SOB, found to be septic likely due to taxotere induced lung injury    # Neuro: Sedated, intubated. Nimbex paralysis discontinued 12/22. Sedation discontinued this AM. Will closely monitor patient for improvement in mental status.    # Pulm: Intubated 12/17 for likely ARDS, all presumed infectious etiologies ruled out at this time.  - urine legionella, BAL, transbronchial bx, bcx, sputum all negative  - concern remains for pneumonitis, had been on taxotere since Aug with 3 weeks on and 1 week off cycles, last dose Dec 1st. Taxotere has been associated w/ ARDS and ILD.   - Abx discontinued 12/20 for negative infectious work-up  - C/w IV Solu-medrol 50mg BID (started on 12/18) for suspected ILD inflammatory response.   - Pneumomediastinum s/p bronch. S/p R chest tube placement on 12/23 for pneumothorax    # Card: Last TTE in 2011 with EF 70% and normal LV/RV function. Possible CHF in setting of SOB, LE swelling, and elevated pro-BNP. Patient had episode of new AFib w/RVR requiring cardizem gtt and pressors on 12/22 which discontinued. No anticoagulation at this time. CHADSVASC score of 1. Will consider anticoagulation going forward  - Strict I/O's, daily weights.  - Holding home antihypertensives for now. Will restart as appropriate    # GI - Patient with a very dark ? greenish output from rectal tube. Given downtrending Hgb, will check an FOBT. OGT currently in place with feeds. Mild transaminitis is improving.    # Renal: S/p transplant in 2013 on immunosuppression with mycophenolate and tacrolimus. Baseline Cr 1.2-1.5. PATRICIA during hopsitalization (Cr peaked at 5.75), Cr is now downtrending (Cr = 2.32 today). Patient had required HD earlier in hospital course for hyperkalemia (K+ = 4.5 this AM).  - Renal following, appreciate recs, No plan for HD today  - Urinalysis, urine electrolytes, monitor UOP  - Daily Tacro levels, must be drawn 30 minutes before a dose.  - Parry in place    # ID: Infectious work-up negative to date. S/p 5 days Vanc/Zosyn. S/p Azithro and 2 days of bactrim for suspected PCP. CMV negative, BK virus negative.  - Continue to monitor off abx    # Heme: Patient's Hgb noted to be downtrending since admission. Hgb decreased to 6.8 today. Unclear if anemia is 2/2 darkish output from rectal tube vs prior chemotherapy? Will check an FOBT. Patient with an active T+S. There is no overt signs of bleeding at this time. Will d/w patient's brother today regarding consent for a blood transfusion.     # Endocrine: Now on IV solumedrol. Continue to monitor FS Q6H w/ HISS.    # DVT ppx: HSQ discontinued in the setting of worsening anemia. Will start SCDs.      For changes in clinical status or to get in touch with family, call Vin Mary (Brother) cell - [phone] Patient is a 71 y/o M w/ a PMHx of Prostate cancer (per Heme/Onc, Dx 2006, surgery 2007, on Zoladex until 2015, followed by Casodex, Xtandi, Zytiga, and now on Taxotere with good PSA response. 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 Last chemo December 1st), ESRD s/p renal transplant w/CKDIII (2013, on Mycophenolate and tacrolimus, baseline Cr 1.2 – 1.5), HTN, who presents with 3 weeks of cough and 2 days of rapidly increasing SOB, found to be septic likely due to taxotere induced lung injury.    # Neuro: Sedated, intubated. Nimbex paralysis discontinued 12/22. Sedation discontinued this AM. Will closely monitor patient for improvement in mental status.    # Pulm: Intubated 12/17 for suspected ARDS, all presumed infectious etiologies ruled out at this time.  - urine legionella, BAL, transbronchial bx, bcx, sputum all negative  - concern remains for pneumonitis, had been on taxotere since Aug with 3 weeks on and 1 week off cycles, last dose Dec 1st. Taxotere has been associated w/ ARDS and ILD.   - Abx discontinued 12/20 for negative infectious work-up  - C/w IV Solu-medrol 50mg BID (started on 12/18) for suspected ILD inflammatory response.   - Pneumomediastinum s/p bronch. S/p R chest tube placement on 12/23 for pneumothorax    # Card: Last TTE in 2011 with EF 70% and normal LV/RV function. Possible CHF in setting of SOB, LE swelling, and elevated pro-BNP. Patient had episode of new AFib w/RVR requiring cardizem gtt and pressors on 12/22 which discontinued. No anticoagulation at this time. CHADSVASC score of 1. Will consider anticoagulation going forward  - Strict I/O's, daily weights.  - Holding home antihypertensives for now. Will restart as appropriate    # GI - Patient with a very dark output from rectal tube. Also some dark contents coming from OGT. FOBT pending. Transaminitis resolved this AM.    # Renal: S/p transplant in 2013 on immunosuppression with mycophenolate and tacrolimus. Baseline Cr 1.2-1.5. PATRICIA during hopsitalization (Cr peaked at 5.75), Cr had been improving, but it uptrended today (2.32-->2.91). Patient had required HD earlier in hospital course for hyperkalemia (K+ = 4.5 this AM).  - Renal following, appreciate recs, No plan for HD today  - Urinalysis, urine electrolytes, monitor UOP  - Daily Tacro levels, must be drawn 30 minutes before a dose.  - Parry in place    # ID: Infectious work-up negative to date. S/p 5 days Vanc/Zosyn. S/p Azithro and 2 days of bactrim for suspected PCP. CMV negative, BK virus negative.  - Continue to monitor off abx    # Heme: Patient's Hgb noted to be downtrending since admission. He was given 1U of pRBCs yesterday with poor response (6.8-->7.0) Anemia is possibly 2/2 darkish output from rectal tube/OGT vs prior chemotherapy? FOBT pending. Keep active T+S.      # Endocrine: Now on IV solumedrol. Continue to monitor FS Q6H w/ HISS.    # DVT ppx: HSQ discontinued in the setting of worsening anemia. C/w SCDs.      For changes in clinical status or to get in touch with family, call Vin Cervantestate (Brother) cell - [phone] Patient is a 73 y/o M w/ a PMHx of Prostate cancer (per Heme/Onc, Dx 2006, surgery 2007, on Zoladex until 2015, followed by Casodex, Xtandi, Zytiga, and now on Taxotere with good PSA response. 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 Last chemo December 1st), ESRD s/p renal transplant w/CKDIII (2013, on Mycophenolate and tacrolimus, baseline Cr 1.2 – 1.5), HTN, who presents with 3 weeks of cough and 2 days of rapidly increasing SOB, found to be septic likely due to taxotere induced lung injury.    # Neuro: Sedated, intubated. Nimbex paralysis discontinued 12/22. Sedation discontinued this AM. Will closely monitor patient for improvement in mental status.    # Pulm: Intubated 12/17 for suspected ARDS, all presumed infectious etiologies ruled out at this time.  - urine legionella, BAL, transbronchial bx, bcx, sputum all negative  - concern remains for pneumonitis, had been on taxotere since Aug with 3 weeks on and 1 week off cycles, last dose Dec 1st. Taxotere has been associated w/ ARDS and ILD.   - Abx discontinued 12/20 for negative infectious work-up  - C/w IV Solu-medrol 50mg BID (started on 12/18) for suspected ILD inflammatory response.   - Pneumomediastinum s/p bronch. S/p R chest tube placement on 12/23 for pneumothorax    # Card: Last TTE in 2011 with EF 70% and normal LV/RV function. Possible CHF in setting of SOB, LE swelling, and elevated pro-BNP. Patient had episode of new AFib w/RVR requiring cardizem gtt and pressors on 12/22 which discontinued. No anticoagulation at this time. CHADSVASC score of 1. Will consider anticoagulation going forward  - Strict I/O's, daily weights.  - Holding home antihypertensives for now. Will restart as appropriate    # GI - Patient with a very dark output from rectal tube. Also some dark contents coming from OGT. FOBT pending. Transaminitis resolved this AM.    # Renal: S/p transplant in 2013 on immunosuppression with mycophenolate and tacrolimus. Baseline Cr 1.2-1.5. PATRICIA during hopsitalization (Cr peaked at 5.75), Cr had been improving, but it uptrended today (2.32-->2.91). Patient had required HD earlier in hospital course for hyperkalemia (K+ = 4.5 this AM).  - Renal following, appreciate recs, No plan for HD today  - Urinalysis, urine electrolytes, monitor UOP  - Daily Tacro levels, must be drawn 30 minutes before a dose.  - Parry in place    # ID: Infectious work-up negative to date. S/p 5 days Vanc/Zosyn. S/p Azithro and 2 days of bactrim for suspected PCP. CMV negative, BK virus negative.  - Continue to monitor off abx    # Heme: Patient's Hgb noted to be downtrending since admission. He was given 1U of pRBCs yesterday with poor response (6.8-->7.0) Anemia is possibly 2/2 darkish output from rectal tube/OGT vs prior chemotherapy? FOBT pending. Will discuss with family if they would like to proceed with a scope evaluation due to concern for an UGIB. Keep active T+S.      # Endocrine: Now on IV solumedrol. Continue to monitor FS Q6H w/ HISS.    # DVT ppx: HSQ discontinued in the setting of worsening anemia. C/w SCDs.      For changes in clinical status or to get in touch with family, call Vin Usman (Brother) cell - [phone] Patient is a 71 y/o M w/ a PMHx of Prostate cancer (per Heme/Onc, Dx 2006, surgery 2007, on Zoladex until 2015, followed by Casodex, Xtandi, Zytiga, and now on Taxotere with good PSA response. 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 Last chemo December 1st), ESRD s/p renal transplant w/CKDIII (2013, on Mycophenolate and tacrolimus, baseline Cr 1.2 – 1.5), HTN, who presents with 3 weeks of cough and 2 days of rapidly increasing SOB, found to be septic likely due to Taxotere induced lung injury, now with suspicion for UGIB.    # Neuro: Sedated, intubated. Nimbex paralysis discontinued 12/22. Currently sedated on Propofol and Fentanyl gtts.    # Pulm: Intubated 12/17 for likely ARDS, all presumed infectious etiologies ruled out at this time.  - urine legionella, BAL, transbronchial bx, bcx, sputum all negative  - concern remains for pneumonitis, had been on Taxotere since Aug with 3 weeks on and 1 week off cycles, last dose Dec 1st. Taxotere has been associated w/ ARDS and ILD.   - Abx discontinued 12/20 for negative infectious work-up  - Will switch IV Solu-medrol to 40mg BID (steroids were started on 12/18 for suspected ILD inflammatory response).   - Pneumomediastinum s/p bronch. S/p R chest tube placement on 12/23 for pneumothorax    # Card: Last TTE in 2011 with EF 70% and normal LV/RV function. Possible CHF in setting of SOB, LE swelling, and elevated pro-BNP. Patient had episode of new AFib w/RVR requiring Cardizem gtt and pressors on 12/22 which discontinued. He went into A-fib with RVR again yesterday which was likely 2/2 vent discordance (resolved with sedation). No anticoagulation at this time. CHADSVASC score of 1. Will consider anticoagulation going forward  - Strict I/O's, daily weights.  - Currently HDS while on a Norepinephrine gtt. Will wean down as tolerated.    # GI - Patient with a very dark output from rectal tube, and there is now some dark contents coming from OGT. FOBT pending. Given downtrendning Hgb in the setting of this dark output, concern for UGIB. Will d/w family if they would like to proceed with a scope evaluation. Transaminitis resolved this AM.    # Renal: S/p transplant in 2013 on immunosuppression with mycophenolate and tacrolimus. Baseline Cr 1.2-1.5. PATRICIA during hopsitalization (Cr peaked at 5.75), Cr had been improving, but it uptrended today (2.32-->2.91). Patient had required HD earlier in hospital course for hyperkalemia (K+ = 4.5 this AM).  - Renal following, appreciate recs, No plan for HD today  - Urinalysis, urine electrolytes, monitor UOP  - Daily Tacro levels, must be drawn 30 minutes before a dose.  - Parry in place    # ID: Infectious work-up negative to date. S/p 5 days Vanc/Zosyn. S/p Azithro and 2 days of bactrim for suspected PCP. CMV negative, BK virus negative.  - Continue to monitor off abx    # Heme: Patient's Hgb noted to be downtrending since admission. He was given 1U of pRBCs yesterday with poor response (6.8-->7.0) Anemia is possibly 2/2 darkish output from rectal tube/OGT vs prior chemotherapy? FOBT pending. Will discuss with family if they would like to proceed with a scope evaluation due to concern for an UGIB. Keep active T+S.      # Endocrine: Now on IV solumedrol. Continue to monitor FS Q6H w/ HISS.    # DVT ppx: HSQ discontinued in the setting of worsening anemia. C/w SCDs.      For changes in clinical status or to get in touch with family, call Vin Usman (Brother) cell - [phone] Patient is a 71 y/o M w/ a PMHx of Prostate cancer (per Heme/Onc, Dx 2006, surgery 2007, on Zoladex until 2015, followed by Casodex, Xtandi, Zytiga, and now on Taxotere with good PSA response. 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 Last chemo December 1st), ESRD s/p renal transplant w/CKDIII (2013, on Mycophenolate and tacrolimus, baseline Cr 1.2 – 1.5), HTN, who presents with 3 weeks of cough and 2 days of rapidly increasing SOB, found to be septic likely due to Taxotere induced lung injury, now with suspicion for UGIB.    # Neuro: Sedated, intubated. Nimbex paralysis discontinued 12/22. Currently sedated on Propofol and Fentanyl gtts.    # Pulm: Intubated 12/17 for likely ARDS, all presumed infectious etiologies ruled out at this time.  - urine legionella, BAL, transbronchial bx, bcx, sputum all negative  - concern remains for pneumonitis, had been on Taxotere since Aug with 3 weeks on and 1 week off cycles, last dose Dec 1st. Taxotere has been associated w/ ARDS and ILD.   - Abx discontinued 12/20 for negative infectious work-up  - Will switch IV Solu-medrol to 40mg BID (steroids were started on 12/18 for suspected ILD inflammatory response).   - Pneumomediastinum s/p bronch. S/p R chest tube placement on 12/23 for pneumothorax    # Card: Last TTE in 2011 with EF 70% and normal LV/RV function. Possible CHF in setting of SOB, LE swelling, and elevated pro-BNP. Patient had episode of new AFib w/RVR requiring Cardizem gtt and pressors on 12/22 which discontinued. He went into A-fib with RVR again yesterday which was likely 2/2 vent discordance (resolved with sedation). No anticoagulation at this time. CHADSVASC score of 1. Will consider anticoagulation going forward  - Strict I/O's, daily weights.  - Currently HDS while on a Norepinephrine gtt. Will wean down as tolerated.    # GI - Patient with a very dark output from rectal tube, and there is now some dark contents coming from OGT. FOBT pending. Given downtrendning Hgb in the setting of this dark output, concern for UGIB. Will d/w family if they would like to proceed with a scope evaluation. Will start patient on a Protonix gtt for now. Transaminitis resolved this AM.    # Renal: S/p transplant in 2013 on immunosuppression with Mycophenolate and Tacrolimus. Baseline Cr 1.2-1.5. PATRICIA during hospitalization (Cr peaked at 5.75), Cr had been improving, but it uptrended today (2.32-->2.91). Patient had required HD earlier in hospital course for hyperkalemia (K+ = 4.5 this AM).  - Renal following, appreciate recs, No plan for HD today  - Urinalysis, urine electrolytes, monitor UOP  - Daily Tacro levels, must be drawn 30 minutes before a dose.  - Parry in place    # ID: Infectious work-up negative to date. S/p 5 days Vanc/Zosyn. S/p Azithro and 2 days of bactrim for suspected PCP. CMV negative, BK virus negative.  - As patient is on high dose steroids, will start patient on Mepron for PCP prophylaxis.  - Continue to monitor off abx    # Heme: Patient's Hgb noted to be downtrending since admission. He was given 1U of pRBCs yesterday with poor response (6.8-->7.0) Anemia is possibly 2/2 darkish output from rectal tube/OGT vs prior chemotherapy? FOBT pending. Will discuss with family if they would like to proceed with a scope evaluation due to concern for an UGIB. Keep active T+S. Monitor CBC.     # Endocrine: Currently on IV solumedrol. Continue to monitor FS Q6H w/ HISS.    # DVT ppx: HSQ discontinued in the setting of worsening anemia. C/w SCDs.      For changes in clinical status or to get in touch with family, call Vin Mary (Brother) cell - [phone]

## 2017-12-26 NOTE — PROGRESS NOTE ADULT - SUBJECTIVE AND OBJECTIVE BOX
Interval Events:    REVIEW OF SYSTEMS:  Constitutional: [ ] fevers [ ] chills [ ] weight loss [ ] weight gain  HEENT: [ ] dry eyes [ ] eye irritation [ ] postnasal drip [ ] nasal congestion  CV: [ ] chest pain [ ] orthopnea [ ] palpitations [ ] murmur  Resp: [ ] cough [ ] shortness of breath [ ] dyspnea [ ] wheezing [ ] sputum [ ] hemoptysis  GI: [ ] nausea [ ] vomiting [ ] diarrhea [ ] constipation [ ] abd pain [ ] dysphagia   : [ ] dysuria [ ] nocturia [ ] hematuria [ ] increased urinary frequency  Musculoskeletal: [ ] back pain [ ] myalgias [ ] arthralgias [ ] fracture  Skin: [ ] rash [ ] itch  Neurological: [ ] headache [ ] dizziness [ ] syncope [ ] weakness [ ] numbness  Psychiatric: [ ] anxiety [ ] depression  Endocrine: [ ] diabetes [ ] thyroid problem  Hematologic/Lymphatic: [ ] anemia [ ] bleeding problem  Allergic/Immunologic: [ ] itchy eyes [ ] nasal discharge [ ] hives [ ] angioedema  [ ] All other systems negative  [ ] Unable to assess ROS because ________    OBJECTIVE:  ICU Vital Signs Last 24 Hrs  T(C): 36.7 (26 Dec 2017 00:00), Max: 37.2 (25 Dec 2017 12:00)  T(F): 98.1 (26 Dec 2017 00:00), Max: 99 (25 Dec 2017 12:00)  HR: 80 (26 Dec 2017 07:21) (79 - 144)  BP: 124/55 (26 Dec 2017 07:00) (88/46 - 152/65)  BP(mean): 70 (26 Dec 2017 07:00) (55 - 85)  ABP: --  ABP(mean): --  RR: 18 (26 Dec 2017 07:00) (17 - 27)  SpO2: 95% (26 Dec 2017 07:21) (90% - 97%)    Mode: AC/ CMV (Assist Control/ Continuous Mandatory Ventilation), RR (machine): 18, TV (machine): 450, FiO2: 50, PEEP: 5, MAP: 10, PIP: 28    12-25 @ 07:01  -  12-26 @ 07:00  --------------------------------------------------------  IN: 1308.4 mL / OUT: 763 mL / NET: 545.4 mL      CAPILLARY BLOOD GLUCOSE      POCT Blood Glucose.: 175 mg/dL (26 Dec 2017 05:26)      PHYSICAL EXAM:  General:   HEENT:   Lymph Nodes:  Neck:   Respiratory:   Cardiovascular:   Abdomen:   Extremities:   Skin:   Neurological:  Psychiatry:    LINES:    HOSPITAL MEDICATIONS:  MEDICATIONS  (STANDING):  chlorhexidine 0.12% Liquid 15 milliLiter(s) Swish and Spit two times a day  chlorhexidine 4% Liquid 1 Application(s) Topical daily  fentaNYL   Infusion 2 MICROgram(s)/kG/Hr (16.72 mL/Hr) IV Continuous <Continuous>  insulin lispro (HumaLOG) corrective regimen sliding scale   SubCutaneous every 6 hours  methylPREDNISolone sodium succinate Injectable 50 milliGRAM(s) IV Push every 12 hours  midazolam Infusion 1 mG/Hr (1 mL/Hr) IV Continuous <Continuous>  norepinephrine Infusion 0.04 MICROgram(s)/kG/Min (6.27 mL/Hr) IV Continuous <Continuous>  pantoprazole  Injectable 40 milliGRAM(s) IV Push two times a day  petrolatum Ophthalmic Ointment 1 Application(s) Both EYES two times a day  propofol Infusion 50 MICROgram(s)/kG/Min (25.08 mL/Hr) IV Continuous <Continuous>  sevelamer carbonate Powder 1600 milliGRAM(s) Oral three times a day with meals  tacrolimus 1 milliGRAM(s) Oral every 12 hours    MEDICATIONS  (PRN):      LABS:                        7.0    41.18 )-----------( 497      ( 26 Dec 2017 02:35 )             23.0     Hgb Trend: 7.0<--, 6.8<--, 7.0<--, 7.2<--, 8.6<--  12-26    137  |  97<L>  |  86<H>  ----------------------------<  165<H>  4.8   |  23  |  2.91<H>    Ca    7.9<L>      26 Dec 2017 02:35  Phos  7.8     12-26  Mg     2.5     12-26    TPro  4.4<L>  /  Alb  2.3<L>  /  TBili  0.2  /  DBili  x   /  AST  23  /  ALT  33  /  AlkPhos  87  12-26    Creatinine Trend: 2.91<--, 2.32<--, 2.52<--, 3.40<--, 3.00<--, 4.34<--            MICROBIOLOGY:     RADIOLOGY:  [ ] Reviewed and interpreted by me    EKG: Interval Events: Patient given 1U of blood yesterday. He continues to have dark, liquid stool coming out of rectal tube. Small amount of dark content coming out of OGT as well. Patient is currently intubated and sedated. No response to noxious stimuli.    REVIEW OF SYSTEMS:  Constitutional: [ ] fevers [ ] chills [ ] weight loss [ ] weight gain  HEENT: [ ] dry eyes [ ] eye irritation [ ] postnasal drip [ ] nasal congestion  CV: [ ] chest pain [ ] orthopnea [ ] palpitations [ ] murmur  Resp: [ ] cough [ ] shortness of breath [ ] dyspnea [ ] wheezing [ ] sputum [ ] hemoptysis  GI: [ ] nausea [ ] vomiting [ ] diarrhea [ ] constipation [ ] abd pain [ ] dysphagia   : [ ] dysuria [ ] nocturia [ ] hematuria [ ] increased urinary frequency  Musculoskeletal: [ ] back pain [ ] myalgias [ ] arthralgias [ ] fracture  Skin: [ ] rash [ ] itch  Neurological: [ ] headache [ ] dizziness [ ] syncope [ ] weakness [ ] numbness  Psychiatric: [ ] anxiety [ ] depression  Endocrine: [ ] diabetes [ ] thyroid problem  Hematologic/Lymphatic: [ ] anemia [ ] bleeding problem  Allergic/Immunologic: [ ] itchy eyes [ ] nasal discharge [ ] hives [ ] angioedema  [ ] All other systems negative  [ ] Unable to assess ROS because ________    OBJECTIVE:  ICU Vital Signs Last 24 Hrs  T(C): 36.7 (26 Dec 2017 00:00), Max: 37.2 (25 Dec 2017 12:00)  T(F): 98.1 (26 Dec 2017 00:00), Max: 99 (25 Dec 2017 12:00)  HR: 80 (26 Dec 2017 07:21) (79 - 144)  BP: 124/55 (26 Dec 2017 07:00) (88/46 - 152/65)  BP(mean): 70 (26 Dec 2017 07:00) (55 - 85)  ABP: --  ABP(mean): --  RR: 18 (26 Dec 2017 07:00) (17 - 27)  SpO2: 95% (26 Dec 2017 07:21) (90% - 97%)    Mode: AC/ CMV (Assist Control/ Continuous Mandatory Ventilation), RR (machine): 18, TV (machine): 450, FiO2: 50, PEEP: 5, MAP: 10, PIP: 28    12-25 @ 07:01  -  12-26 @ 07:00  --------------------------------------------------------  IN: 1308.4 mL / OUT: 763 mL / NET: 545.4 mL      CAPILLARY BLOOD GLUCOSE      POCT Blood Glucose.: 175 mg/dL (26 Dec 2017 05:26)      PHYSICAL EXAM:  General:   HEENT:   Lymph Nodes:  Neck:   Respiratory:   Cardiovascular:   Abdomen:   Extremities:   Skin:   Neurological:  Psychiatry:    LINES:    HOSPITAL MEDICATIONS:  MEDICATIONS  (STANDING):  chlorhexidine 0.12% Liquid 15 milliLiter(s) Swish and Spit two times a day  chlorhexidine 4% Liquid 1 Application(s) Topical daily  fentaNYL   Infusion 2 MICROgram(s)/kG/Hr (16.72 mL/Hr) IV Continuous <Continuous>  insulin lispro (HumaLOG) corrective regimen sliding scale   SubCutaneous every 6 hours  methylPREDNISolone sodium succinate Injectable 50 milliGRAM(s) IV Push every 12 hours  midazolam Infusion 1 mG/Hr (1 mL/Hr) IV Continuous <Continuous>  norepinephrine Infusion 0.04 MICROgram(s)/kG/Min (6.27 mL/Hr) IV Continuous <Continuous>  pantoprazole  Injectable 40 milliGRAM(s) IV Push two times a day  petrolatum Ophthalmic Ointment 1 Application(s) Both EYES two times a day  propofol Infusion 50 MICROgram(s)/kG/Min (25.08 mL/Hr) IV Continuous <Continuous>  sevelamer carbonate Powder 1600 milliGRAM(s) Oral three times a day with meals  tacrolimus 1 milliGRAM(s) Oral every 12 hours    MEDICATIONS  (PRN):      LABS:                        7.0    41.18 )-----------( 497      ( 26 Dec 2017 02:35 )             23.0     Hgb Trend: 7.0<--, 6.8<--, 7.0<--, 7.2<--, 8.6<--  12-26    137  |  97<L>  |  86<H>  ----------------------------<  165<H>  4.8   |  23  |  2.91<H>    Ca    7.9<L>      26 Dec 2017 02:35  Phos  7.8     12-26  Mg     2.5     12-26    TPro  4.4<L>  /  Alb  2.3<L>  /  TBili  0.2  /  DBili  x   /  AST  23  /  ALT  33  /  AlkPhos  87  12-26    Creatinine Trend: 2.91<--, 2.32<--, 2.52<--, 3.40<--, 3.00<--, 4.34<--            MICROBIOLOGY:     RADIOLOGY:  [ ] Reviewed and interpreted by me    EKG: Interval Events: Patient given 1U of blood yesterday. He continues to have dark, liquid stool coming out of rectal tube. Small amount of dark content coming out of OGT as well. Patient is currently intubated and sedated. No response to noxious stimuli.    REVIEW OF SYSTEMS:  Constitutional: [ ] fevers [ ] chills [ ] weight loss [ ] weight gain  HEENT: [ ] dry eyes [ ] eye irritation [ ] postnasal drip [ ] nasal congestion  CV: [ ] chest pain [ ] orthopnea [ ] palpitations [ ] murmur  Resp: [ ] cough [ ] shortness of breath [ ] dyspnea [ ] wheezing [ ] sputum [ ] hemoptysis  GI: [ ] nausea [ ] vomiting [ ] diarrhea [ ] constipation [ ] abd pain [ ] dysphagia   : [ ] dysuria [ ] nocturia [ ] hematuria [ ] increased urinary frequency  Musculoskeletal: [ ] back pain [ ] myalgias [ ] arthralgias [ ] fracture  Skin: [ ] rash [ ] itch  Neurological: [ ] headache [ ] dizziness [ ] syncope [ ] weakness [ ] numbness  Psychiatric: [ ] anxiety [ ] depression  Endocrine: [ ] diabetes [ ] thyroid problem  Hematologic/Lymphatic: [ ] anemia [ ] bleeding problem  Allergic/Immunologic: [ ] itchy eyes [ ] nasal discharge [ ] hives [ ] angioedema  [ ] All other systems negative  [x] Unable to assess ROS because patient is intubated and sedated.    OBJECTIVE:  ICU Vital Signs Last 24 Hrs  T(C): 36.7 (26 Dec 2017 00:00), Max: 37.2 (25 Dec 2017 12:00)  T(F): 98.1 (26 Dec 2017 00:00), Max: 99 (25 Dec 2017 12:00)  HR: 80 (26 Dec 2017 07:21) (79 - 144)  BP: 124/55 (26 Dec 2017 07:00) (88/46 - 152/65)  BP(mean): 70 (26 Dec 2017 07:00) (55 - 85)  ABP: --  ABP(mean): --  RR: 18 (26 Dec 2017 07:00) (17 - 27)  SpO2: 95% (26 Dec 2017 07:21) (90% - 97%)    Mode: AC/ CMV (Assist Control/ Continuous Mandatory Ventilation), RR (machine): 18, TV (machine): 450, FiO2: 50, PEEP: 5, MAP: 10, PIP: 28    12-25 @ 07:01  -  12-26 @ 07:00  --------------------------------------------------------  IN: 1308.4 mL / OUT: 763 mL / NET: 545.4 mL      CAPILLARY BLOOD GLUCOSE      POCT Blood Glucose.: 175 mg/dL (26 Dec 2017 05:26)      PHYSICAL EXAM:  General: Intubated and sedated  HEENT: Pupils are sluggishly reactive bilaterally, Slightly dry mucous membranes  Respiratory: Intubated, Grossly clear anteriorly, R chest tube in place  Cardiovascular: RRR; +S1/S2  Abdomen: + BS, Soft, Rectal tube with very dark output, OGT in place with small dark content drained  Extremities: Diffuse edema of extremities  Skin: Warm and dry  Neurological: Sedated, Not following commands, Not responding to painful stimuli  Psychiatry: Unable to assess    LINES: [facility]    HOSPITAL MEDICATIONS:  MEDICATIONS  (STANDING):  chlorhexidine 0.12% Liquid 15 milliLiter(s) Swish and Spit two times a day  chlorhexidine 4% Liquid 1 Application(s) Topical daily  fentaNYL   Infusion 2 MICROgram(s)/kG/Hr (16.72 mL/Hr) IV Continuous <Continuous>  insulin lispro (HumaLOG) corrective regimen sliding scale   SubCutaneous every 6 hours  methylPREDNISolone sodium succinate Injectable 50 milliGRAM(s) IV Push every 12 hours  midazolam Infusion 1 mG/Hr (1 mL/Hr) IV Continuous <Continuous>  norepinephrine Infusion 0.04 MICROgram(s)/kG/Min (6.27 mL/Hr) IV Continuous <Continuous>  pantoprazole  Injectable 40 milliGRAM(s) IV Push two times a day  petrolatum Ophthalmic Ointment 1 Application(s) Both EYES two times a day  propofol Infusion 50 MICROgram(s)/kG/Min (25.08 mL/Hr) IV Continuous <Continuous>  sevelamer carbonate Powder 1600 milliGRAM(s) Oral three times a day with meals  tacrolimus 1 milliGRAM(s) Oral every 12 hours    MEDICATIONS  (PRN):      LABS:                        7.0    41.18 )-----------( 497      ( 26 Dec 2017 02:35 )             23.0     Hgb Trend: 7.0<--, 6.8<--, 7.0<--, 7.2<--, 8.6<--  12-26    137  |  97<L>  |  86<H>  ----------------------------<  165<H>  4.8   |  23  |  2.91<H>    Ca    7.9<L>      26 Dec 2017 02:35  Phos  7.8     12-26  Mg     2.5     12-26    TPro  4.4<L>  /  Alb  2.3<L>  /  TBili  0.2  /  DBili  x   /  AST  23  /  ALT  33  /  AlkPhos  87  12-26    Creatinine Trend: 2.91<--, 2.32<--, 2.52<--, 3.40<--, 3.00<--, 4.34<--

## 2017-12-27 LAB
ALBUMIN SERPL ELPH-MCNC: 2.1 G/DL — LOW (ref 3.3–5)
ALP SERPL-CCNC: 82 U/L — SIGNIFICANT CHANGE UP (ref 40–120)
ALT FLD-CCNC: 28 U/L — SIGNIFICANT CHANGE UP (ref 4–41)
ANISOCYTOSIS BLD QL: SIGNIFICANT CHANGE UP
ANISOCYTOSIS BLD QL: SLIGHT — SIGNIFICANT CHANGE UP
APTT BLD: 24.4 SEC — LOW (ref 27.5–37.4)
AST SERPL-CCNC: 22 U/L — SIGNIFICANT CHANGE UP (ref 4–40)
BASOPHILS # BLD AUTO: 0.04 K/UL — SIGNIFICANT CHANGE UP (ref 0–0.2)
BASOPHILS # BLD AUTO: 0.08 K/UL — SIGNIFICANT CHANGE UP (ref 0–0.2)
BASOPHILS # BLD AUTO: 0.31 K/UL — HIGH (ref 0–0.2)
BASOPHILS NFR BLD AUTO: 0.1 % — SIGNIFICANT CHANGE UP (ref 0–2)
BASOPHILS NFR BLD AUTO: 0.1 % — SIGNIFICANT CHANGE UP (ref 0–2)
BASOPHILS NFR BLD AUTO: 0.4 % — SIGNIFICANT CHANGE UP (ref 0–2)
BASOPHILS NFR SPEC: 0 % — SIGNIFICANT CHANGE UP (ref 0–2)
BASOPHILS NFR SPEC: 0 % — SIGNIFICANT CHANGE UP (ref 0–2)
BILIRUB SERPL-MCNC: 0.2 MG/DL — SIGNIFICANT CHANGE UP (ref 0.2–1.2)
BLASTS # FLD: 0 % — SIGNIFICANT CHANGE UP (ref 0–0)
BLD GP AB SCN SERPL QL: NEGATIVE — SIGNIFICANT CHANGE UP
BUN SERPL-MCNC: 103 MG/DL — HIGH (ref 7–23)
CALCIUM SERPL-MCNC: 7.9 MG/DL — LOW (ref 8.4–10.5)
CHLORIDE SERPL-SCNC: 99 MMOL/L — SIGNIFICANT CHANGE UP (ref 98–107)
CO2 SERPL-SCNC: 20 MMOL/L — LOW (ref 22–31)
CREAT SERPL-MCNC: 2.89 MG/DL — HIGH (ref 0.5–1.3)
D DIMER BLD IA.RAPID-MCNC: 3871 NG/ML — SIGNIFICANT CHANGE UP
EOSINOPHIL # BLD AUTO: 0.03 K/UL — SIGNIFICANT CHANGE UP (ref 0–0.5)
EOSINOPHIL # BLD AUTO: 0.24 K/UL — SIGNIFICANT CHANGE UP (ref 0–0.5)
EOSINOPHIL # BLD AUTO: 0.87 K/UL — HIGH (ref 0–0.5)
EOSINOPHIL NFR BLD AUTO: 0 % — SIGNIFICANT CHANGE UP (ref 0–6)
EOSINOPHIL NFR BLD AUTO: 0.6 % — SIGNIFICANT CHANGE UP (ref 0–6)
EOSINOPHIL NFR BLD AUTO: 1.5 % — SIGNIFICANT CHANGE UP (ref 0–6)
EOSINOPHIL NFR FLD: 0 % — SIGNIFICANT CHANGE UP (ref 0–6)
EOSINOPHIL NFR FLD: 0 % — SIGNIFICANT CHANGE UP (ref 0–6)
FIBRINOGEN PPP-MCNC: 448 MG/DL — SIGNIFICANT CHANGE UP (ref 310–510)
GIANT PLATELETS BLD QL SMEAR: PRESENT — SIGNIFICANT CHANGE UP
GLUCOSE BLDC GLUCOMTR-MCNC: 148 MG/DL — HIGH (ref 70–99)
GLUCOSE BLDC GLUCOMTR-MCNC: 167 MG/DL — HIGH (ref 70–99)
GLUCOSE BLDC GLUCOMTR-MCNC: 180 MG/DL — HIGH (ref 70–99)
GLUCOSE BLDC GLUCOMTR-MCNC: 45 MG/DL — CRITICAL LOW (ref 70–99)
GLUCOSE BLDC GLUCOMTR-MCNC: 92 MG/DL — SIGNIFICANT CHANGE UP (ref 70–99)
GLUCOSE SERPL-MCNC: 170 MG/DL — HIGH (ref 70–99)
HCT VFR BLD CALC: 20.3 % — CRITICAL LOW (ref 39–50)
HCT VFR BLD CALC: 23.9 % — LOW (ref 39–50)
HCT VFR BLD CALC: 26.6 % — LOW (ref 39–50)
HGB BLD-MCNC: 6.6 G/DL — CRITICAL LOW (ref 13–17)
HGB BLD-MCNC: 7.3 G/DL — LOW (ref 13–17)
HGB BLD-MCNC: 8.2 G/DL — LOW (ref 13–17)
IMM GRANULOCYTES # BLD AUTO: 0.94 # — SIGNIFICANT CHANGE UP
IMM GRANULOCYTES # BLD AUTO: 2.12 # — SIGNIFICANT CHANGE UP
IMM GRANULOCYTES # BLD AUTO: 5.25 # — SIGNIFICANT CHANGE UP
IMM GRANULOCYTES NFR BLD AUTO: 2.5 % — HIGH (ref 0–1.5)
IMM GRANULOCYTES NFR BLD AUTO: 3.7 % — HIGH (ref 0–1.5)
IMM GRANULOCYTES NFR BLD AUTO: 6.2 % — HIGH (ref 0–1.5)
INR BLD: 0.95 — SIGNIFICANT CHANGE UP (ref 0.88–1.17)
LYMPHOCYTES # BLD AUTO: 0.48 K/UL — LOW (ref 1–3.3)
LYMPHOCYTES # BLD AUTO: 1.3 % — LOW (ref 13–44)
LYMPHOCYTES # BLD AUTO: 1.76 K/UL — SIGNIFICANT CHANGE UP (ref 1–3.3)
LYMPHOCYTES # BLD AUTO: 1.83 K/UL — SIGNIFICANT CHANGE UP (ref 1–3.3)
LYMPHOCYTES # BLD AUTO: 2.2 % — LOW (ref 13–44)
LYMPHOCYTES # BLD AUTO: 3 % — LOW (ref 13–44)
LYMPHOCYTES NFR SPEC AUTO: 0 % — LOW (ref 13–44)
LYMPHOCYTES NFR SPEC AUTO: 3.3 % — LOW (ref 13–44)
MAGNESIUM SERPL-MCNC: 2.5 MG/DL — SIGNIFICANT CHANGE UP (ref 1.6–2.6)
MANUAL SMEAR VERIFICATION: SIGNIFICANT CHANGE UP
MCHC RBC-ENTMCNC: 23.8 PG — LOW (ref 27–34)
MCHC RBC-ENTMCNC: 25.6 PG — LOW (ref 27–34)
MCHC RBC-ENTMCNC: 26.7 PG — LOW (ref 27–34)
MCHC RBC-ENTMCNC: 30.5 % — LOW (ref 32–36)
MCHC RBC-ENTMCNC: 30.8 % — LOW (ref 32–36)
MCHC RBC-ENTMCNC: 32.5 % — SIGNIFICANT CHANGE UP (ref 32–36)
MCV RBC AUTO: 77.9 FL — LOW (ref 80–100)
MCV RBC AUTO: 78.7 FL — LOW (ref 80–100)
MCV RBC AUTO: 86.6 FL — SIGNIFICANT CHANGE UP (ref 80–100)
METAMYELOCYTES # FLD: 1.7 % — HIGH (ref 0–1)
MICROCYTES BLD QL: SLIGHT — SIGNIFICANT CHANGE UP
MICROCYTES BLD QL: SLIGHT — SIGNIFICANT CHANGE UP
MONOCYTES # BLD AUTO: 1.42 K/UL — HIGH (ref 0–0.9)
MONOCYTES # BLD AUTO: 3.06 K/UL — HIGH (ref 0–0.9)
MONOCYTES # BLD AUTO: 4.06 K/UL — HIGH (ref 0–0.9)
MONOCYTES NFR BLD AUTO: 3.7 % — SIGNIFICANT CHANGE UP (ref 2–14)
MONOCYTES NFR BLD AUTO: 4.8 % — SIGNIFICANT CHANGE UP (ref 2–14)
MONOCYTES NFR BLD AUTO: 5.3 % — SIGNIFICANT CHANGE UP (ref 2–14)
MONOCYTES NFR BLD: 1.6 % — LOW (ref 2–9)
MONOCYTES NFR BLD: 3.8 % — SIGNIFICANT CHANGE UP (ref 2–9)
MYELOCYTES NFR BLD: 0 % — SIGNIFICANT CHANGE UP (ref 0–0)
NEUTROPHIL AB SER-ACNC: 90.9 % — HIGH (ref 43–77)
NEUTROPHIL AB SER-ACNC: 96.2 % — HIGH (ref 43–77)
NEUTROPHILS # BLD AUTO: 34.85 K/UL — HIGH (ref 1.8–7.4)
NEUTROPHILS # BLD AUTO: 50.13 K/UL — HIGH (ref 1.8–7.4)
NEUTROPHILS # BLD AUTO: 72.57 K/UL — HIGH (ref 1.8–7.4)
NEUTROPHILS NFR BLD AUTO: 86.4 % — HIGH (ref 43–77)
NEUTROPHILS NFR BLD AUTO: 86.4 % — HIGH (ref 43–77)
NEUTROPHILS NFR BLD AUTO: 91.8 % — HIGH (ref 43–77)
NEUTS BAND # BLD: 2.5 % — SIGNIFICANT CHANGE UP (ref 0–6)
NRBC # FLD: 0.1 — SIGNIFICANT CHANGE UP
NRBC # FLD: 0.47 — SIGNIFICANT CHANGE UP
NRBC # FLD: 2.32 — SIGNIFICANT CHANGE UP
NRBC FLD-RTO: 2.8 — SIGNIFICANT CHANGE UP
OTHER - HEMATOLOGY %: 0 — SIGNIFICANT CHANGE UP
PHOSPHATE SERPL-MCNC: 8.2 MG/DL — HIGH (ref 2.5–4.5)
PLATELET # BLD AUTO: 341 K/UL — SIGNIFICANT CHANGE UP (ref 150–400)
PLATELET # BLD AUTO: 410 K/UL — HIGH (ref 150–400)
PLATELET # BLD AUTO: 447 K/UL — HIGH (ref 150–400)
PLATELET CLUMP BLD QL SMEAR: SLIGHT — SIGNIFICANT CHANGE UP
PLATELET COUNT - ESTIMATE: NORMAL — SIGNIFICANT CHANGE UP
PLATELET COUNT - ESTIMATE: NORMAL — SIGNIFICANT CHANGE UP
PMV BLD: 9.7 FL — SIGNIFICANT CHANGE UP (ref 7–13)
PMV BLD: 9.7 FL — SIGNIFICANT CHANGE UP (ref 7–13)
PMV BLD: 9.9 FL — SIGNIFICANT CHANGE UP (ref 7–13)
POIKILOCYTOSIS BLD QL AUTO: SIGNIFICANT CHANGE UP
POIKILOCYTOSIS BLD QL AUTO: SLIGHT — SIGNIFICANT CHANGE UP
POLYCHROMASIA BLD QL SMEAR: SLIGHT — SIGNIFICANT CHANGE UP
POTASSIUM SERPL-MCNC: 4.8 MMOL/L — SIGNIFICANT CHANGE UP (ref 3.5–5.3)
POTASSIUM SERPL-SCNC: 4.8 MMOL/L — SIGNIFICANT CHANGE UP (ref 3.5–5.3)
PROMYELOCYTES # FLD: 0 % — SIGNIFICANT CHANGE UP (ref 0–0)
PROT SERPL-MCNC: 4.4 G/DL — LOW (ref 6–8.3)
PROTHROM AB SERPL-ACNC: 10.5 SEC — SIGNIFICANT CHANGE UP (ref 9.8–13.1)
RBC # BLD: 2.58 M/UL — LOW (ref 4.2–5.8)
RBC # BLD: 3.07 M/UL — LOW (ref 4.2–5.8)
RBC # BLD: 3.07 M/UL — LOW (ref 4.2–5.8)
RBC # FLD: 22.2 % — HIGH (ref 10.3–14.5)
RBC # FLD: 22.3 % — HIGH (ref 10.3–14.5)
RBC # FLD: 22.4 % — HIGH (ref 10.3–14.5)
REVIEW TO FOLLOW: YES — SIGNIFICANT CHANGE UP
RH IG SCN BLD-IMP: POSITIVE — SIGNIFICANT CHANGE UP
SCHISTOCYTES BLD QL AUTO: SLIGHT — SIGNIFICANT CHANGE UP
SODIUM SERPL-SCNC: 138 MMOL/L — SIGNIFICANT CHANGE UP (ref 135–145)
SPHEROCYTES BLD QL SMEAR: SLIGHT — SIGNIFICANT CHANGE UP
TACROLIMUS SERPL-MCNC: 16.3 NG/ML — SIGNIFICANT CHANGE UP
VARIANT LYMPHS # BLD: 0 % — SIGNIFICANT CHANGE UP
WBC # BLD: 37.97 K/UL — HIGH (ref 3.8–10.5)
WBC # BLD: 58.02 K/UL — CRITICAL HIGH (ref 3.8–10.5)
WBC # BLD: 84.05 K/UL — CRITICAL HIGH (ref 3.8–10.5)
WBC # FLD AUTO: 37.97 K/UL — HIGH (ref 3.8–10.5)
WBC # FLD AUTO: 58.02 K/UL — CRITICAL HIGH (ref 3.8–10.5)
WBC # FLD AUTO: 84.05 K/UL — CRITICAL HIGH (ref 3.8–10.5)

## 2017-12-27 PROCEDURE — 99232 SBSQ HOSP IP/OBS MODERATE 35: CPT | Mod: GC

## 2017-12-27 PROCEDURE — 99291 CRITICAL CARE FIRST HOUR: CPT

## 2017-12-27 PROCEDURE — 99233 SBSQ HOSP IP/OBS HIGH 50: CPT | Mod: GC

## 2017-12-27 RX ORDER — PHENYLEPHRINE HYDROCHLORIDE 10 MG/ML
0.4 INJECTION INTRAVENOUS
Qty: 80 | Refills: 0 | Status: DISCONTINUED | OUTPATIENT
Start: 2017-12-27 | End: 2017-12-28

## 2017-12-27 RX ORDER — NOREPINEPHRINE BITARTRATE/D5W 8 MG/250ML
0.01 PLASTIC BAG, INJECTION (ML) INTRAVENOUS
Qty: 8 | Refills: 0 | Status: DISCONTINUED | OUTPATIENT
Start: 2017-12-27 | End: 2017-12-27

## 2017-12-27 RX ORDER — NOREPINEPHRINE BITARTRATE/D5W 8 MG/250ML
0.04 PLASTIC BAG, INJECTION (ML) INTRAVENOUS
Qty: 16 | Refills: 0 | Status: DISCONTINUED | OUTPATIENT
Start: 2017-12-27 | End: 2017-12-28

## 2017-12-27 RX ORDER — PHENYLEPHRINE HYDROCHLORIDE 10 MG/ML
0.4 INJECTION INTRAVENOUS
Qty: 160 | Refills: 0 | Status: DISCONTINUED | OUTPATIENT
Start: 2017-12-27 | End: 2017-12-27

## 2017-12-27 RX ORDER — SODIUM CHLORIDE 9 MG/ML
250 INJECTION, SOLUTION INTRAVENOUS ONCE
Qty: 0 | Refills: 0 | Status: DISCONTINUED | OUTPATIENT
Start: 2017-12-27 | End: 2017-12-28

## 2017-12-27 RX ADMIN — CHLORHEXIDINE GLUCONATE 1 APPLICATION(S): 213 SOLUTION TOPICAL at 12:06

## 2017-12-27 RX ADMIN — SEVELAMER CARBONATE 1600 MILLIGRAM(S): 2400 POWDER, FOR SUSPENSION ORAL at 18:12

## 2017-12-27 RX ADMIN — ATOVAQUONE 1500 MILLIGRAM(S): 750 SUSPENSION ORAL at 12:27

## 2017-12-27 RX ADMIN — TACROLIMUS 1 MILLIGRAM(S): 5 CAPSULE ORAL at 21:37

## 2017-12-27 RX ADMIN — PROPOFOL 25.08 MICROGRAM(S)/KG/MIN: 10 INJECTION, EMULSION INTRAVENOUS at 19:31

## 2017-12-27 RX ADMIN — Medication 40 MILLIGRAM(S): at 18:12

## 2017-12-27 RX ADMIN — Medication 1: at 12:27

## 2017-12-27 RX ADMIN — CHLORHEXIDINE GLUCONATE 15 MILLILITER(S): 213 SOLUTION TOPICAL at 18:12

## 2017-12-27 RX ADMIN — SEVELAMER CARBONATE 1600 MILLIGRAM(S): 2400 POWDER, FOR SUSPENSION ORAL at 12:26

## 2017-12-27 RX ADMIN — PHENYLEPHRINE HYDROCHLORIDE 6.27 MICROGRAM(S)/KG/MIN: 10 INJECTION INTRAVENOUS at 11:34

## 2017-12-27 RX ADMIN — CHLORHEXIDINE GLUCONATE 15 MILLILITER(S): 213 SOLUTION TOPICAL at 05:28

## 2017-12-27 RX ADMIN — PROPOFOL 25.08 MICROGRAM(S)/KG/MIN: 10 INJECTION, EMULSION INTRAVENOUS at 08:29

## 2017-12-27 RX ADMIN — Medication 1 APPLICATION(S): at 05:28

## 2017-12-27 RX ADMIN — PANTOPRAZOLE SODIUM 10 MG/HR: 20 TABLET, DELAYED RELEASE ORAL at 05:30

## 2017-12-27 RX ADMIN — SEVELAMER CARBONATE 1600 MILLIGRAM(S): 2400 POWDER, FOR SUSPENSION ORAL at 08:29

## 2017-12-27 RX ADMIN — Medication 3.13 MICROGRAM(S)/KG/MIN: at 19:32

## 2017-12-27 RX ADMIN — Medication 1 APPLICATION(S): at 18:12

## 2017-12-27 RX ADMIN — FENTANYL CITRATE 16.72 MICROGRAM(S)/KG/HR: 50 INJECTION INTRAVENOUS at 19:31

## 2017-12-27 RX ADMIN — PANTOPRAZOLE SODIUM 10 MG/HR: 20 TABLET, DELAYED RELEASE ORAL at 19:32

## 2017-12-27 RX ADMIN — Medication 0.78 MICROGRAM(S)/KG/MIN: at 05:29

## 2017-12-27 RX ADMIN — PROPOFOL 25.08 MICROGRAM(S)/KG/MIN: 10 INJECTION, EMULSION INTRAVENOUS at 05:29

## 2017-12-27 RX ADMIN — Medication 40 MILLIGRAM(S): at 05:29

## 2017-12-27 RX ADMIN — Medication 1: at 00:42

## 2017-12-27 RX ADMIN — FENTANYL CITRATE 16.72 MICROGRAM(S)/KG/HR: 50 INJECTION INTRAVENOUS at 05:30

## 2017-12-27 RX ADMIN — TACROLIMUS 1 MILLIGRAM(S): 5 CAPSULE ORAL at 05:29

## 2017-12-27 RX ADMIN — Medication 50 MILLILITER(S): at 23:50

## 2017-12-27 NOTE — PROGRESS NOTE ADULT - PROVIDER SPECIALTY LIST ADULT
Gastroenterology
MICU
Nephrology
MICU
Nephrology

## 2017-12-27 NOTE — PROGRESS NOTE ADULT - ASSESSMENT
Impression:  1) Acute on chronic anemia: Hgb 7.3 this morning. Non-clotted dark liquid stool in rectal tube bag.   2) Acute respiratory failure now intubated   3) Prostate cancer s/p chemotherapy  4) ESRD 2/2 HTN s/p renal transplant on immunosuppression     Plan:  - given comorbidities and lack of active bleeding, no plan for endoscopic evaluation at this time  - CBC daily; transfuse to Hgb > 7  - PPI IV BID while NGT/OGT in place  - if active/overt bleeding, would consider EGD  - monitor rectal tube output Impression:  1) Acute on chronic anemia: Hgb 7.3 this morning.  2) Acute respiratory failure now intubated   3) Prostate cancer s/p chemotherapy  4) ESRD 2/2 HTN s/p renal transplant on immunosuppression     Plan:  - possible EGD tomorrow if Hgb continues to downtrend   - CBC daily; transfuse to Hgb > 7  - PPI IV BID while NGT/OGT in place  - monitor rectal tube + NGT output  - NPO past midnight tonight for possible bedside scope tomorrow

## 2017-12-27 NOTE — PROGRESS NOTE ADULT - PROBLEM SELECTOR PLAN 2
Patient with kidney transplantation (DDRT) in 2013. C/w tacrolimus 1 mg BID.  goal tacro level 4 to 5 in setting of possible infection and hemodynamic PATRICIA.  Last FK is 16.3 on  but was drawn 2 hours post dose.   I spoke with primary team and reinforced drawing true troughs.  Monitor trough.  Myfortic on hold as patient with critical illness and on solumedrol for possible pneumonitis.  Will re-evaluate daily for reinitiation of myfortic.  Check any new medications for interactions through cy pathway and other interactions with tacro.

## 2017-12-27 NOTE — PROGRESS NOTE ADULT - NSHPATTENDINGPLANDISCUSS_GEN_ALL_CORE
MICU team
brother
MICU attending
MICU fellow, resident and RN staff.
MICU team
MICU team.
MICU team.
MICU attending
MICU residents.

## 2017-12-27 NOTE — PROGRESS NOTE ADULT - PROBLEM SELECTOR PLAN 1
PATRICIA in setting of critical illness and elevated tacrolimus dose. Patient remains fluid overloaded. However urine output has improved since yesterday.  Check urine culture to rule out pyelonephritis of transplant kidney. Spoke with MICU fellow will plan on HD with UF today to see if volume removal helps with vent weaning. Monitor Scr and urine output.  Recommend continue strict I+O.  Renal dose medications.

## 2017-12-27 NOTE — PROGRESS NOTE ADULT - ASSESSMENT
Patient is a 73 y/o M w/ a PMHx of Prostate cancer (per Heme/Onc, Dx 2006, surgery 2007, on Zoladex until 2015, followed by Casodex, Xtandi, Zytiga, and now on Taxotere with good PSA response. 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 Last chemo December 1st), ESRD s/p renal transplant w/CKDIII (2013, on Mycophenolate and tacrolimus, baseline Cr 1.2 – 1.5), HTN, who presents with 3 weeks of cough and 2 days of rapidly increasing SOB, found to be septic likely due to Taxotere induced lung injury, now with suspicion for UGIB.    # Neuro: Sedated, intubated. Nimbex paralysis discontinued 12/22. Currently sedated on Propofol and Fentanyl gtts.     # Pulm: Intubated 12/17 for likely ARDS, all presumed infectious etiologies ruled out at this time.  - urine legionella, BAL, transbronchial bx, bcx, sputum all negative  - concern remains for pneumonitis, had been on Taxotere since Aug with 3 weeks on and 1 week off cycles, last dose Dec 1st. Taxotere has been associated w/ ARDS and ILD.   - Abx discontinued 12/20 for negative infectious work-up  - Will switch IV Solu-medrol to 40mg BID (steroids were started on 12/18 for suspected ILD inflammatory response).   - Pneumomediastinum s/p bronch. S/p R chest tube placement on 12/23 for pneumothorax  -Will restarting cellcept help pneumonitis?    # Card: Last TTE in 2011 with EF 70% and normal LV/RV function. Possible CHF in setting of SOB, LE swelling, and elevated pro-BNP. Patient had episode of new AFib w/RVR requiring Cardizem gtt and pressors on 12/22 which discontinued. He went into A-fib with RVR again yesterday which was likely 2/2 vent discordance (resolved with sedation). No anticoagulation at this time. CHADSVASC score of 1. Will consider anticoagulation going forward  - Strict I/O's, daily weights.  - Currently HDS while on a Norepinephrine gtt. Will wean down as tolerated.    # GI - Patient with a very dark output from rectal tube, and there is now some dark contents coming from OGT.  Given downtrending Hgb in the setting of this dark output, concern for UGIB. On protonix gtt. Will d/w family if they would like to proceed with a scope evaluation. As per GI, pt without overt signs of bleeding. Given high risk for scope would opt to hold off on scoping, discussed with family. Transaminitis resolved this AM.    # Renal: S/p transplant in 2013 on immunosuppression with Mycophenolate and Tacrolimus. Baseline Cr 1.2-1.5. PATRICIA during hospitalization (Cr peaked at 5.75), Cr had been improving, but it uptrended today (2.32-->2.91). Patient had required HD earlier in hospital course for hyperkalemia (K+ = 4.5 this AM).  - Renal following, appreciate recs, no indication for HD however renal willing to do UF if it will increase patient's chances to being successfully extubated.   - monitor UOP  - Daily Tacro levels, must be drawn 30 minutes before a dose.  - Parry in place    # ID: Infectious work-up negative to date. S/p 5 days Vanc/Zosyn. S/p Azithro and 2 days of bactrim for suspected PCP. CMV negative, BK virus negative.  - As patient is on high dose steroids, will start patient on Mepron for PCP prophylaxis.  - Continue to monitor off abx    # Heme: Patient's Hgb noted to be downtrending since admission. He was given additional 1U of pRBCs yesterday with poor response (8.3->7.0) Anemia is possibly 2/2 darkish output from rectal tube/OGT vs prior chemotherapy?No plans for scope as per GI. Keep active T+S. Monitor CBC.     # Endocrine: Currently on IV solumedrol. Continue to monitor FS Q6H w/ HISS.    # DVT ppx: HSQ discontinued in the setting of worsening anemia. C/w SCDs.      For changes in clinical status or to get in touch with family, call Vin Mary (Brother) cell - [phone]

## 2017-12-27 NOTE — PROVIDER CONTACT NOTE (OTHER) - BACKGROUND
Patient was being dialyzed, Heart rate ranged between 112-120 during the first hour patient was becoming tachycardic intermittently.

## 2017-12-27 NOTE — PROGRESS NOTE ADULT - PROBLEM SELECTOR PROBLEM 3
Hyperphosphatemia
Hyperphosphatemia
Hyperkalemia
Hyperphosphatemia

## 2017-12-27 NOTE — PROGRESS NOTE ADULT - PROBLEM SELECTOR PROBLEM 1
PATRICIA (acute kidney injury)

## 2017-12-27 NOTE — PROGRESS NOTE ADULT - SUBJECTIVE AND OBJECTIVE BOX
GASTROENTEROLOGY FOLLOW-UP NOTE    Chief Complaint:  Patient is a 72y old  Male who presents with a chief complaint of SOB for 4 days (16 Dec 2017 18:45)      Interval Events:   - Hgb 7.3 this morning.    Allergies:  No Known Allergies      Hospital Medications:  atovaquone Suspension 1500 milliGRAM(s) Oral daily  chlorhexidine 0.12% Liquid 15 milliLiter(s) Swish and Spit two times a day  chlorhexidine 4% Liquid 1 Application(s) Topical daily  fentaNYL   Infusion 2 MICROgram(s)/kG/Hr IV Continuous <Continuous>  insulin lispro (HumaLOG) corrective regimen sliding scale   SubCutaneous every 6 hours  methylPREDNISolone sodium succinate Injectable 40 milliGRAM(s) IV Push every 12 hours  norepinephrine Infusion 0.005 MICROgram(s)/kG/Min IV Continuous <Continuous>  pantoprazole Infusion 8 mG/Hr IV Continuous <Continuous>  petrolatum Ophthalmic Ointment 1 Application(s) Both EYES two times a day  propofol Infusion 50 MICROgram(s)/kG/Min IV Continuous <Continuous>  sevelamer carbonate Powder 1600 milliGRAM(s) Oral three times a day with meals  tacrolimus 1 milliGRAM(s) Oral every 12 hours      PMHX/PSHX:  Dyslipidemia  HTN (Hypertension)  CKD (Chronic Kidney Disease)  History of renal transplant  History of Prostatectomy  AVF (Arteriovenous Fistula)      Family history:  No pertinent family history in first degree relatives      ROS:     General:  No wt loss, fevers, chills, night sweats, fatigue,   Eyes:  Good vision, no reported pain  ENT:  No sore throat, pain, runny nose, dysphagia  CV:  No pain, palpitations, hypo/hypertension  Resp:  No dyspnea, cough, tachypnea, wheezing  GI:  see HPI  :  No pain, bleeding, incontinence, nocturia  Muscle:  No pain, weakness  Neuro:  No weakness, tingling, memory problems  Psych:  No fatigue, insomnia, mood problems, depression  Endocrine:  No polyuria, polydipsia, cold/heat intolerance  Heme:  No petechiae, ecchymosis, easy bruisability  Skin:  No rash, tattoos, scars, edema      PHYSICAL EXAM:   Vital Signs:  Vital Signs Last 24 Hrs  T(C): 36.4 (27 Dec 2017 08:00), Max: 36.8 (26 Dec 2017 20:00)  T(F): 97.6 (27 Dec 2017 08:00), Max: 98.3 (27 Dec 2017 04:00)  HR: 93 (27 Dec 2017 08:00) (75 - 97)  BP: 107/49 (27 Dec 2017 08:00) (94/40 - 192/58)  BP(mean): 61 (27 Dec 2017 08:00) (51 - 89)  RR: 18 (27 Dec 2017 08:00) (11 - 26)  SpO2: 95% (27 Dec 2017 08:00) (90% - 96%)  Daily     Daily Weight in k.4 (26 Dec 2017 12:39)    GENERAL: intubated/sedated, family at bedside   HEENT:  -icterus   CHEST:  +vent sounds   HEART:  RRR, S1S2  ABDOMEN:  soft, non-tender, non-distended, rectal tube with dark brown liquid output   EXTEREMITIES:  trace  edema  SKIN:  No rash  NEURO:  sedated    LABS:                        7.3    37.97 )-----------( 447      ( 27 Dec 2017 03:20 )             23.9         138  |  99  |  103<H>  ----------------------------<  170<H>  4.8   |  20<L>  |  2.89<H>    Ca    7.9<L>      27 Dec 2017 03:20  Phos  8.2       Mg     2.5         TPro  4.4<L>  /  Alb  2.1<L>  /  TBili  0.2  /  DBili  x   /  AST  22  /  ALT  28  /  AlkPhos  82      LIVER FUNCTIONS - ( 27 Dec 2017 03:20 )  Alb: 2.1 g/dL / Pro: 4.4 g/dL / ALK PHOS: 82 u/L / ALT: 28 u/L / AST: 22 u/L / GGT: x                   Imaging:

## 2017-12-27 NOTE — PROGRESS NOTE ADULT - SUBJECTIVE AND OBJECTIVE BOX
Interval Events: Transfused 1 unit of PRBCs with initial appropriate response but repeat hgb this morning downtrended from 8.3 to 7. Continues to have liquid black stools in rectal tube. Attempted to wean off sedation but began to stack breaths and galaviz the vent therefore re-sedated.    REVIEW OF SYSTEMS:  Constitutional: [ ] negative [ ] fevers [ ] chills [ ] weight loss [ ] weight gain  HEENT: [ ] negative [ ] dry eyes [ ] eye irritation [ ] postnasal drip [ ] nasal congestion  CV: [ ] negative  [ ] chest pain [ ] orthopnea [ ] palpitations [ ] murmur  Resp: [ ] negative [ ] cough [ ] shortness of breath [ ] dyspnea [ ] wheezing [ ] sputum [ ] hemoptysis  GI: [ ] negative [ ] nausea [ ] vomiting [ ] diarrhea [ ] constipation [ ] abd pain [ ] dysphagia   : [ ] negative [ ] dysuria [ ] nocturia [ ] hematuria [ ] increased urinary frequency  Musculoskeletal: [ ] negative [ ] back pain [ ] myalgias [ ] arthralgias [ ] fracture  Skin: [ ] negative [ ] rash [ ] itch  Neurological: [ ] negative [ ] headache [ ] dizziness [ ] syncope [ ] weakness [ ] numbness  Psychiatric: [ ] negative [ ] anxiety [ ] depression  Endocrine: [ ] negative [ ] diabetes [ ] thyroid problem  Hematologic/Lymphatic: [ ] negative [ ] anemia [ ] bleeding problem  Allergic/Immunologic: [ ] negative [ ] itchy eyes [ ] nasal discharge [ ] hives [ ] angioedema  [ ] All other systems negative  [X ] Unable to assess ROS because intubated    OBJECTIVE:  ICU Vital Signs Last 24 Hrs  T(C): 36.8 (27 Dec 2017 04:00), Max: 36.8 (26 Dec 2017 20:00)  T(F): 98.3 (27 Dec 2017 04:00), Max: 98.3 (27 Dec 2017 04:00)  HR: 94 (27 Dec 2017 07:22) (75 - 95)  BP: 99/46 (27 Dec 2017 06:00) (94/40 - 192/58)  BP(mean): 57 (27 Dec 2017 06:00) (51 - 89)  ABP: --  ABP(mean): --  RR: 18 (27 Dec 2017 06:00) (11 - 26)  SpO2: 96% (27 Dec 2017 07:22) (90% - 97%)    Mode: AC/ CMV (Assist Control/ Continuous Mandatory Ventilation), RR (machine): 18, TV (machine): 450, FiO2: 40, PEEP: 5, MAP: 10, PIP: 28    12-26 @ 07:01  -  12-27 @ 07:00  --------------------------------------------------------  IN: 1412.8 mL / OUT: 497 mL / NET: 915.8 mL      CAPILLARY BLOOD GLUCOSE      POCT Blood Glucose.: 148 mg/dL (27 Dec 2017 05:23)      PHYSICAL EXAM:  General: Intubated and sedated  HEENT: Pupils are sluggishly reactive bilaterally, Slightly dry mucous membranes  Respiratory: Intubated, Grossly clear anteriorly, R chest tube in place  Cardiovascular: RRR; +S1/S2  Abdomen: + BS, Soft, Rectal tube with very dark output, OGT in place with small dark content drained  Extremities: +1 PE extremities  Skin: Warm and dry  Neurological: Sedated, Not following commands, Not responding to painful stimuli  Psychiatry: Unable to assess  LINES:[facility]    HOSPITAL MEDICATIONS:    atovaquone Suspension 1500 milliGRAM(s) Oral daily    norepinephrine Infusion 0.005 MICROgram(s)/kG/Min IV Continuous <Continuous>    insulin lispro (HumaLOG) corrective regimen sliding scale   SubCutaneous every 6 hours  methylPREDNISolone sodium succinate Injectable 40 milliGRAM(s) IV Push every 12 hours      fentaNYL   Infusion 2 MICROgram(s)/kG/Hr IV Continuous <Continuous>  propofol Infusion 50 MICROgram(s)/kG/Min IV Continuous <Continuous>    pantoprazole Infusion 8 mG/Hr IV Continuous <Continuous>          tacrolimus 1 milliGRAM(s) Oral every 12 hours    chlorhexidine 0.12% Liquid 15 milliLiter(s) Swish and Spit two times a day  chlorhexidine 4% Liquid 1 Application(s) Topical daily  petrolatum Ophthalmic Ointment 1 Application(s) Both EYES two times a day    sevelamer carbonate Powder 1600 milliGRAM(s) Oral three times a day with meals      LABS:                        7.3    37.97 )-----------( 447      ( 27 Dec 2017 03:20 )             23.9     Hgb Trend: 7.3<--, 8.3<--, 7.0<--, 6.8<--, 7.0<--  12-27    138  |  99  |  103<H>  ----------------------------<  170<H>  4.8   |  20<L>  |  2.89<H>    Ca    7.9<L>      27 Dec 2017 03:20  Phos  8.2     12-27  Mg     2.5     12-27    TPro  4.4<L>  /  Alb  2.1<L>  /  TBili  0.2  /  DBili  x   /  AST  22  /  ALT  28  /  AlkPhos  82  12-27    Creatinine Trend: 2.89<--, 2.91<--, 2.32<--, 2.52<--, 3.40<--, 3.00<--      Arterial Blood Gas:  12-26 @ 22:40  7.35/44/69/23/91.7/-1.4  ABG lactate: 0.8

## 2017-12-27 NOTE — PROGRESS NOTE ADULT - PROBLEM SELECTOR PROBLEM 2
Renal transplant, status post

## 2017-12-27 NOTE — PROGRESS NOTE ADULT - SUBJECTIVE AND OBJECTIVE BOX
[facility] Division of Kidney Diseases & Hypertension  FOLLOW UP NOTE  --------------------------------------------------------------------------------    HPI: 72-year-old male with h/o ESRD s/p kidney transplantation in 2013, CKD stage III, prostate cancer and HTN presented with complaints of SOB and cough for the past 3 days. Patient is currently in MICU for management of respiratory failure. Nephrology was consulted for elevated Scr and management of transplant immunosuppression. On review of previous records in [facility], patient had DDRT done on January 2013 at [facility]. Post transplant course was complicated by delayed graft function and perforated bladder. Patient currently follows with his outpatient nephrologist Dr. Luevano for kidney transplant management. Scr was 1.37 on labs done on 10/30/17. His outpatient transplant medications are tacrolimus 2 mg PO BID, and myfortic 360 mg PO BID.  He was intubated due to worsening respiratory status. Patient was initiated on HD on 12/21/17 due to worsening uremia and hyperkalemia. Dialyzed again 12/22 for hyperkalemia.  Patient had rapid heart rate requiring cardizem during HD 12/23.  Last HD was on 12/24/17.  Today patient seen and evaluated in MICU; currently intubated and sedated.  Still urinating overnight.  Fi02 requirement now improved.    PAST HISTORY  --------------------------------------------------------------------------------  No significant changes to PMH, PSH, FHx, SHx, unless otherwise noted    ALLERGIES & MEDICATIONS  --------------------------------------------------------------------------------  Allergies    No Known Allergies    Intolerances      Standing Inpatient Medications  atovaquone Suspension 1500 milliGRAM(s) Oral daily  chlorhexidine 0.12% Liquid 15 milliLiter(s) Swish and Spit two times a day  chlorhexidine 4% Liquid 1 Application(s) Topical daily  fentaNYL   Infusion 2 MICROgram(s)/kG/Hr IV Continuous <Continuous>  insulin lispro (HumaLOG) corrective regimen sliding scale   SubCutaneous every 6 hours  methylPREDNISolone sodium succinate Injectable 40 milliGRAM(s) IV Push every 12 hours  norepinephrine Infusion 0.005 MICROgram(s)/kG/Min IV Continuous <Continuous>  pantoprazole Infusion 8 mG/Hr IV Continuous <Continuous>  petrolatum Ophthalmic Ointment 1 Application(s) Both EYES two times a day  propofol Infusion 50 MICROgram(s)/kG/Min IV Continuous <Continuous>  sevelamer carbonate Powder 1600 milliGRAM(s) Oral three times a day with meals  tacrolimus 1 milliGRAM(s) Oral every 12 hours    PRN Inpatient Medications    Review of Systems:  unable to obtain    VITALS/PHYSICAL EXAM  --------------------------------------------------------------------------------  T(C): 36.8 (12-27-17 @ 04:00), Max: 36.8 (12-26-17 @ 20:00)  HR: 94 (12-27-17 @ 07:22) (75 - 95)  BP: 99/46 (12-27-17 @ 06:00) (94/40 - 192/58)  RR: 18 (12-27-17 @ 06:00) (11 - 26)  SpO2: 96% (12-27-17 @ 07:22) (90% - 97%)  Wt(kg): --        12-26-17 @ 07:01  -  12-27-17 @ 07:00  --------------------------------------------------------  IN: 1412.8 mL / OUT: 497 mL / NET: 915.8 mL    Physical Exam:  	Gen: NAD  	HEENT: +ETT  	Pulm: crepitations, mechanical vent sounds  	CV: S1S2; no rub  	Abd: soft, rectal tube  	: marie in place  	UE: anasarca  	LE: anasarca  	Neuro: sedated  	Skin: cool  	Vascular access: right upper extremity AVF    LABS/STUDIES  --------------------------------------------------------------------------------              7.3    37.97 >-----------<  447      [12-27-17 @ 03:20]              23.9     138  |  99  |  103  ----------------------------<  170      [12-27-17 @ 03:20]  4.8   |  20  |  2.89        Ca     7.9     [12-27-17 @ 03:20]      Mg     2.5     [12-27-17 @ 03:20]      Phos  8.2     [12-27-17 @ 03:20]    TPro  4.4  /  Alb  2.1  /  TBili  0.2  /  DBili  x   /  AST  22  /  ALT  28  /  AlkPhos  82  [12-27-17 @ 03:20]          Creatinine Trend:  SCr 2.89 [12-27 @ 03:20]  SCr 2.91 [12-26 @ 02:35]  SCr 2.32 [12-25 @ 02:57]  SCr 2.52 [12-24 @ 02:45]  SCr 3.40 [12-23 @ 02:35]          HBsAb 61.6      [12-21-17 @ 17:10]  HBsAg NEGATIVE      [12-21-17 @ 17:10]  HBcAb Nonreactive      [12-21-17 @ 17:10]  HCV 0.06, Nonreactive Hepatitis C AB  S/CO Ratio                        Interpretation  < 1.0                                     Non-Reactive  1.0 - 4.9                           Weakly-Reactive  > 5.0                                 Reactive  Non-Reactive: Aperson with a non-reactive HCV antibody  result is considered uninfected.  No further action is  needed unless recent infection is suspected.  In these  cases, consider repeat testing later to detect  seroconversion..  Weakly-Reactive: HCV antibody test is abnormal, HCV RNA  Qualitative test will follow.  Reactive: HCV antibody test is abnormal, HCV RNA  Qualitative test will follow.  Note: HCV antibody testing is performed on the [employer]   system.      [12-21-17 @ 17:10]

## 2017-12-27 NOTE — PROGRESS NOTE ADULT - ATTENDING COMMENTS
hypoxemic resp failure/unclear if drug reaction/infxn/prostate/very guarded
72 year old man with prostate CA on chemotherapy, s/p renal transplant on immunosuppressant medication acute hypoxic respiratory failure due to pneumonitis from chemo agent, acute on chronic renal injury and now with GI bleed    sedated for vent coordination will decrease dose to assess mental status  patient with melanotic stools as well as coffee ground drainage from OGT  start Protonix drip, GI evaluation for possible scope trend CBC transfuse to keep Hgb above 8  urine output remains low and patient volume overloaded will try volume removal via HD today  PCP prophylaxis as patient is on high dose steroids  oxygenation improving will continue vent support  poor prognosis for functional recovery  ongoing goals of care discussions with family    Critical care time 40 minutes
Agree with above. Seen and examined with residents. 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 S/P bronchoscopy with biopsy with pneumothorax. On vent and 60% FiO2. Supportive care, steroids and GOC discussion with family.
hypoxemic resp failure/prob chemo induced/on steroids/very guarded
hypoxemic resp failure/unclear etiology/on vent/await pathology from biopsy/very guarded
remains in hypoxemic resp failure prob from the Taxotere/on vent/very guarded/kidney function worsening
After transfusion yesterday, Hgb ashley to 8.6 only to fall again to 7.3.  Today the NG output is low but has coffee grounds and there is new clots in the rectal output, neither of which were there yesterday.  There has been some tachycardia associated with Levo which was switched to [place]. Dialysis was discontinued due to tachy, but will be restarted.      There appears to be mild degree of blood loss, which may require more attention.  It is not clear that this will lead to a resolvable diagnosis, but the relative risk of endoscopy is not high with him already on propofol and respirator.  Will make decision regarding scoping tomorrow in the AM.     It is clear that the family is not looking for any long term interventions such as tracheostomy, but is agreeable to acute evaluations and care.
hypoxemic resp failure of unknown etiology/will need endotracheal intubation and bronchoscopy/very guarded
hypoxemic resp failure/unclear etiology/needs bronchoscopy/infxn/maliganacy/?drug reaction/guarded
Agree with above. Sedation stopped at 5am. The pateint was on high doses of seation agents and may take a while for them to wear off. COntinue supportive care, GOC discussion with the brother. Poor prognosis for functional recovery.
Mr. Mary was evaluated on rounds in the MICU.  His assessment is recorded in the MICU resident's note.  1- Sedation with Propofol.  2- On MV with fair gas exchange.  Sub Q air noted on neck and chest.  CXR with right sided PTX.  Chest tube placed with "gush" of air noted.    Follow CXR and ABG.  Steroids to continue as ordered.  3- Vasopressors to maintain MAP above 65.  4- HD as ordered by Nephrology.  5- Currently off ABX.  6- GI/DVT prophylaxis.
72 year old man with pancreatic CA on chemotherapy, s/p renal transplant on immunosuppressant medication acute hypoxic respiratory failure due to pneumonitis from chemo agent, acute on chronic renal injury and now with GI bleed    sedated for vent coordination will decrease dose to assess mental status  patient with melanotic stools as well as coffee ground drainage from OGT  start Protonix drip, GI evaluation for possible scope trend CBC transfuse to keep Hgb above 8  no need for HD at this time, urine output remains low  PCP prophylaxis as patient is on high dose steroids  oxygenation improving will continue vent support  poor prognosis for functional recovery  ongoing goals of care discussions with family    Critical care time 40 minutes

## 2017-12-28 LAB
ALBUMIN SERPL ELPH-MCNC: 2 G/DL — LOW (ref 3.3–5)
ALP SERPL-CCNC: 309 U/L — HIGH (ref 40–120)
ALT FLD-CCNC: 5540 U/L — HIGH (ref 4–41)
APTT BLD: 39.7 SEC — HIGH (ref 27.5–37.4)
AST SERPL-CCNC: > 8800 U/L — HIGH (ref 4–40)
BILIRUB SERPL-MCNC: 0.8 MG/DL — SIGNIFICANT CHANGE UP (ref 0.2–1.2)
BUN SERPL-MCNC: 86 MG/DL — HIGH (ref 7–23)
CALCIUM SERPL-MCNC: 7.8 MG/DL — LOW (ref 8.4–10.5)
CHLORIDE SERPL-SCNC: 94 MMOL/L — LOW (ref 98–107)
CO2 SERPL-SCNC: 7 MMOL/L — CRITICAL LOW (ref 22–31)
CREAT SERPL-MCNC: 3.43 MG/DL — HIGH (ref 0.5–1.3)
GLUCOSE BLDC GLUCOMTR-MCNC: 123 MG/DL — HIGH (ref 70–99)
GLUCOSE BLDC GLUCOMTR-MCNC: 93 MG/DL — SIGNIFICANT CHANGE UP (ref 70–99)
GLUCOSE SERPL-MCNC: 46 MG/DL — LOW (ref 70–99)
HCT VFR BLD CALC: 25.4 % — LOW (ref 39–50)
HGB BLD-MCNC: 7.4 G/DL — LOW (ref 13–17)
INR BLD: 1.67 — HIGH (ref 0.88–1.17)
MCHC RBC-ENTMCNC: 26.7 PG — LOW (ref 27–34)
MCHC RBC-ENTMCNC: 29.1 % — LOW (ref 32–36)
MCV RBC AUTO: 91.7 FL — SIGNIFICANT CHANGE UP (ref 80–100)
NRBC # FLD: 2.9 — SIGNIFICANT CHANGE UP
NRBC FLD-RTO: 3.8 — SIGNIFICANT CHANGE UP
PLATELET # BLD AUTO: 290 K/UL — SIGNIFICANT CHANGE UP (ref 150–400)
PMV BLD: 9.9 FL — SIGNIFICANT CHANGE UP (ref 7–13)
POTASSIUM SERPL-MCNC: 7.9 MMOL/L — CRITICAL HIGH (ref 3.5–5.3)
POTASSIUM SERPL-SCNC: 7.9 MMOL/L — CRITICAL HIGH (ref 3.5–5.3)
PROT SERPL-MCNC: 4.3 G/DL — LOW (ref 6–8.3)
PROTHROM AB SERPL-ACNC: 18.7 SEC — HIGH (ref 9.8–13.1)
RBC # BLD: 2.77 M/UL — LOW (ref 4.2–5.8)
RBC # FLD: 21.4 % — HIGH (ref 10.3–14.5)
SODIUM SERPL-SCNC: 138 MMOL/L — SIGNIFICANT CHANGE UP (ref 135–145)
TACROLIMUS SERPL-MCNC: 9.2 NG/ML — SIGNIFICANT CHANGE UP
WBC # BLD: 76.69 K/UL — CRITICAL HIGH (ref 3.8–10.5)
WBC # FLD AUTO: 76.69 K/UL — CRITICAL HIGH (ref 3.8–10.5)

## 2017-12-28 RX ORDER — DEXTROSE 50 % IN WATER 50 %
50 SYRINGE (ML) INTRAVENOUS ONCE
Qty: 0 | Refills: 0 | Status: COMPLETED | OUTPATIENT
Start: 2017-12-28 | End: 2017-12-27

## 2017-12-28 RX ORDER — PHENYLEPHRINE HYDROCHLORIDE 10 MG/ML
0.4 INJECTION INTRAVENOUS
Qty: 160 | Refills: 0 | Status: DISCONTINUED | OUTPATIENT
Start: 2017-12-28 | End: 2017-12-28

## 2017-12-28 RX ADMIN — PHENYLEPHRINE HYDROCHLORIDE 6.27 MICROGRAM(S)/KG/MIN: 10 INJECTION INTRAVENOUS at 01:15

## 2017-12-28 NOTE — CHART NOTE - NSCHARTNOTEFT_GEN_A_CORE
Called by bedside by nurse for asystole.     On physical exam, no spontaneous movements were present. Patient did not respond to verbal or physical stimuli. Pupils were mid-dilated and fixed. Vent was still running so breath sounds were appreciated. No carotid pulses were palpable. No heart sounds were auscultated.   Patient pronounced dead at 430am. Family was called and informed, offered autopsy, HCP declined.

## 2017-12-28 NOTE — DISCHARGE NOTE FOR THE EXPIRED PATIENT - SECONDARY DIAGNOSIS.
Respiratory failure GIB (gastrointestinal bleeding) Chemotherapy induced pulmonary toxicity End stage renal disease

## 2017-12-28 NOTE — DISCHARGE NOTE FOR THE EXPIRED PATIENT - HOSPITAL COURSE
Patient is a 72 year old male with a history of prostate cancer, status-post 10 cycles of XTANDI, last chemotherapy 2017, end-stage renal disease, status-post renal transplant with chronic kidney disease stage III (, on mycophenolate and tacrolimus, baseline creatinine 1.2 – 1.5), and hypertension who presented with worsening shortness of breath for 2-3 days with associated cough for 2-3 weeks prior to admission productive of pink sputum. In the emergency department, patient was hypotensive, tachypneic, and hypoxic. Labs were notable for leukocytosis, acute kidney injury, and elevated serum pro-brain natriuretic peptide and lactate. Chest x-ray showed patchy bilateral opacities possibly secondary to asymmetric pulmonary edema or multifocal pneumonia. Patient was placed on non-rebreather with FiO2 100%. Patient was very tachypneic and hypoxic and was placed on Bipap with FiO2 70% and maintained oxygen saturation 95 – 100%. Blood cultures were drawn and patient was given a dose of vancomycin and zosyn. Patient was transferred to the medical intensive care unit and intubated in preparation for bronchoscopy. Bronchoalveolar lavage was negative for infectious source. Spoke with oncologist who reported that patient on taxotere since August. 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 Pt was treated with steroids, had some improvement and was extubated, eventually had incr'd work of breathing requiring reintubation. Pt continued to decline, family informed, brother who was HCP made DNR. Course further c/b GIB, no aggressive intervention rec'd by GI team given poor prognosis and inability to tolerate invasive procedure, pt was transfused pRBCs. Pt experienced further gradual decline, eventually became asystolic on tele monitor, was declared  by cardio-respiratory criteria on  at 430am.

## 2018-01-10 ENCOUNTER — APPOINTMENT (OUTPATIENT)
Dept: UROLOGY | Facility: CLINIC | Age: 73
End: 2018-01-10

## 2018-01-15 LAB — FUNGUS BLD CULT: SIGNIFICANT CHANGE UP

## 2018-01-18 LAB — TACROLIMUS SERPL-MCNC: 3.7 — SIGNIFICANT CHANGE UP

## 2019-11-07 NOTE — PROCEDURE NOTE - TRACHEAL INTUBATION: TUBE SECURED AT (CM):
Patient plan: Per [facility] care plan- Home  Current status: Pt min assist at right LE for bed mobility and SBA for sit to/from stand transfers. Pt able to ambulate 75' with FWW and SBA, TANA mai. Pt up/down 3 steps with handrail with SBA.  Anticipated status at discharge: Pt will need assist of one to manage right LE when transitioning into and out of bed. SBA recommended for ambulation with FWW and stair management to increase safety.     25

## 2024-04-16 NOTE — ED ADULT NURSE NOTE - EENT WDL
[Patient preference] : as per patient preference [Continuing, patient seen in-person within last 12 months] : Telehealth services are continuing as patient has been seen in-person within last 12 months. [Telehealth (audio & video) - Individual/Group] : This visit was provided via telehealth using real-time 2-way audio visual technology. [Other Location: e.g. Home (Enter Location, City,State)___] : The provider was located at [unfilled]. [Home] : The patient, [unfilled], was located at home, [unfilled], at the time of the visit. [Verbal consent obtained from patient/other participant(s)] : Verbal consent for telehealth/telephonic services obtained from patient/other participant(s) [FreeTextEntry4] : 1 PM [FreeTextEntry5] : 1:53 PM [Patient] : patient, [unfilled] is a ~age~ year old ~male/female~ being seen for a follow-up visit  Eyes with no visual disturbances.  Ears clean and dry and no hearing difficulties. Nose with pink mucosa and no drainage.  Mouth mucous membranes moist and pink.  No tenderness or swelling to throat or neck. [Duration of Psychotherapy Visit (minutes spent in synchronous communication): ____] : Duration of Psychotherapy Visit (minutes spent in synchronous communication): [unfilled] [Individual Psychotherapy for 53+ minutes] : Individual Psychotherapy for 53+ minutes  [Teletherapy Service Provided] : The services provided in this session were delivered via tele-therapy [Follow-Up Visit] : a follow-up visit [FreeTextEntry1] : depression and anxiety